# Patient Record
Sex: MALE | Race: WHITE | NOT HISPANIC OR LATINO | Employment: FULL TIME | URBAN - METROPOLITAN AREA
[De-identification: names, ages, dates, MRNs, and addresses within clinical notes are randomized per-mention and may not be internally consistent; named-entity substitution may affect disease eponyms.]

---

## 2020-03-23 ENCOUNTER — OFFICE VISIT (OUTPATIENT)
Dept: FAMILY MEDICINE CLINIC | Facility: CLINIC | Age: 50
End: 2020-03-23
Payer: COMMERCIAL

## 2020-03-23 VITALS
HEART RATE: 91 BPM | WEIGHT: 315 LBS | RESPIRATION RATE: 14 BRPM | SYSTOLIC BLOOD PRESSURE: 115 MMHG | TEMPERATURE: 97.4 F | OXYGEN SATURATION: 94 % | BODY MASS INDEX: 39.17 KG/M2 | DIASTOLIC BLOOD PRESSURE: 75 MMHG | HEIGHT: 75 IN

## 2020-03-23 DIAGNOSIS — Z13.220 ENCOUNTER FOR SCREENING FOR LIPID DISORDER: ICD-10-CM

## 2020-03-23 DIAGNOSIS — Z11.59 ENCOUNTER FOR HEPATITIS C SCREENING TEST FOR LOW RISK PATIENT: Primary | ICD-10-CM

## 2020-03-23 DIAGNOSIS — N52.9 ERECTILE DYSFUNCTION, UNSPECIFIED ERECTILE DYSFUNCTION TYPE: ICD-10-CM

## 2020-03-23 DIAGNOSIS — Z00.00 WELL ADULT EXAM: ICD-10-CM

## 2020-03-23 PROCEDURE — 3008F BODY MASS INDEX DOCD: CPT | Performed by: FAMILY MEDICINE

## 2020-03-23 PROCEDURE — 99396 PREV VISIT EST AGE 40-64: CPT | Performed by: FAMILY MEDICINE

## 2020-03-23 RX ORDER — SILDENAFIL 50 MG/1
50 TABLET, FILM COATED ORAL DAILY PRN
Qty: 10 TABLET | Refills: 0 | Status: SHIPPED | OUTPATIENT
Start: 2020-03-23

## 2020-03-23 NOTE — PROGRESS NOTES
BMI Counseling: Body mass index is 39 37 kg/m²  The BMI is above normal  Nutrition recommendations include decreasing portion sizes, decreasing fast food intake, limiting drinks that contain sugar, moderation in carbohydrate intake and reducing intake of saturated and trans fat  Exercise recommendations include moderate physical activity 150 minutes/week  No pharmacotherapy was ordered  Assessment/Plan:     Chronic Problems:  No problem-specific Assessment & Plan notes found for this encounter  Visit Diagnosis:  Diagnoses and all orders for this visit:    Encounter for hepatitis C screening test for low risk patient  -     CBC and differential; Future  -     Comprehensive metabolic panel; Future  -     Lipid panel; Future  -     TSH, 3rd generation; Future  -     FSH and LH; Future  -     PSA, Total Screen; Future  -     UA (URINE) with reflex to Scope; Future    Encounter for screening for lipid disorder  -     CBC and differential; Future  -     Comprehensive metabolic panel; Future  -     Lipid panel; Future  -     TSH, 3rd generation; Future  -     FSH and LH; Future  -     PSA, Total Screen; Future  -     UA (URINE) with reflex to Scope; Future    Erectile dysfunction, unspecified erectile dysfunction type  -     CBC and differential; Future  -     Comprehensive metabolic panel; Future  -     Lipid panel; Future  -     TSH, 3rd generation; Future  -     FSH and LH; Future  -     PSA, Total Screen; Future  -     UA (URINE) with reflex to Scope; Future  -     sildenafil (VIAGRA) 50 MG tablet; Take 1 tablet (50 mg total) by mouth daily as needed for erectile dysfunction    Well adult exam  -     CBC and differential; Future  -     Comprehensive metabolic panel; Future  -     Lipid panel; Future  -     TSH, 3rd generation; Future  -     FSH and LH; Future  -     PSA, Total Screen; Future  -     UA (URINE) with reflex to Scope;  Future    well exam   Reviewed age appropriate antic guidance ,   Stressed the importance of weight loss, encouraged   Form completed , uds to be ocmpleted  Wellness Visit for Adults   AMBULATORY CARE:   A wellness visit  is when you see your healthcare provider to get screened for health problems  You can also get advice on how to stay healthy  Write down your questions so you remember to ask them  Ask your healthcare provider how often you should have a wellness visit  What happens at a wellness visit:  Your healthcare provider will ask about your health, and your family history of health problems  This includes high blood pressure, heart disease, and cancer  He or she will ask if you have symptoms that concern you, if you smoke, and about your mood  You may also be asked about your intake of medicines, supplements, food, and alcohol  Any of the following may be done:  · Your weight  will be checked  Your height may also be checked so your body mass index (BMI) can be calculated  Your BMI shows if you are at a healthy weight  · Your blood pressure  and heart rate will be checked  Your temperature may also be checked  · Blood and urine tests  may be done  Blood tests may be done to check your cholesterol levels  Abnormal cholesterol levels increase your risk for heart disease and stroke  You may also need a blood or urine test to check for diabetes if you are at increased risk  Urine tests may be done to look for signs of an infection or kidney disease  · A physical exam  includes checking your heartbeat and lungs with a stethoscope  Your healthcare provider may also check your skin to look for sun damage  · Screening tests  may be recommended  A screening test is done to check for diseases that may not cause symptoms  The screening tests you may need depend on your age, gender, family history, and lifestyle habits  For example, colorectal screening may be recommended if you are 48years old or older    Screening tests you need if you are a woman:   · A Pap smear  is used to screen for cervical cancer  Pap smears are usually done every 3 to 5 years depending on your age  You may need them more often if you have had abnormal Pap smear test results in the past  Ask your healthcare provider how often you should have a Pap smear  · A mammogram  is an x-ray of your breasts to screen for breast cancer  Experts recommend mammograms every 2 years starting at age 48 years  You may need a mammogram at age 52 years or younger if you have an increased risk for breast cancer  Talk to your healthcare provider about when you should start having mammograms and how often you need them  Vaccines you may need:   · Get an influenza vaccine  every year  The influenza vaccine protects you from the flu  Several types of viruses cause the flu  The viruses change over time, so new vaccines are made each year  · Get a tetanus-diphtheria (Td) booster vaccine  every 10 years  This vaccine protects you against tetanus and diphtheria  Tetanus is a severe infection that may cause painful muscle spasms and lockjaw  Diphtheria is a severe bacterial infection that causes a thick covering in the back of your mouth and throat  · Get a human papillomavirus (HPV) vaccine  if you are female and aged 23 to 32 or male 23 to 24 and never received it  This vaccine protects you from HPV infection  HPV is the most common infection spread by sexual contact  HPV may also cause vaginal, penile, and anal cancers  · Get a pneumococcal vaccine  if you are aged 72 years or older  The pneumococcal vaccine is an injection given to protect you from pneumococcal disease  Pneumococcal disease is an infection caused by pneumococcal bacteria  The infection may cause pneumonia, meningitis, or an ear infection  · Get a shingles vaccine  if you are aged 61 or older, even if you have had shingles before  The shingles vaccine is an injection to protect you from the varicella-zoster virus   This is the same virus that causes chickenpox  Shingles is a painful rash that develops in people who had chickenpox or have been exposed to the virus  How to eat healthy:  My Plate is a model for planning healthy meals  It shows the types and amounts of foods that should go on your plate  Fruits and vegetables make up about half of your plate, and grains and protein make up the other half  A serving of dairy is included on the side of your plate  The amount of calories and serving sizes you need depends on your age, gender, weight, and height  Examples of healthy foods are listed below:  · Eat a variety of vegetables  such as dark green, red, and orange vegetables  You can also include canned vegetables low in sodium (salt) and frozen vegetables without added butter or sauces  · Eat a variety of fresh fruits , canned fruit in 100% juice, frozen fruit, and dried fruit  · Include whole grains  At least half of the grains you eat should be whole grains  Examples include whole-wheat bread, wheat pasta, brown rice, and whole-grain cereals such as oatmeal     · Eat a variety of protein foods such as seafood (fish and shellfish), lean meat, and poultry without skin (turkey and chicken)  Examples of lean meats include pork leg, shoulder, or tenderloin, and beef round, sirloin, tenderloin, and extra lean ground beef  Other protein foods include eggs and egg substitutes, beans, peas, soy products, nuts, and seeds  · Choose low-fat dairy products such as skim or 1% milk or low-fat yogurt, cheese, and cottage cheese  · Limit unhealthy fats  such as butter, hard margarine, and shortening  Exercise:  Exercise at least 30 minutes per day on most days of the week  Some examples of exercise include walking, biking, dancing, and swimming  You can also fit in more physical activity by taking the stairs instead of the elevator or parking farther away from stores  Include muscle strengthening activities 2 days each week   Regular exercise provides many health benefits  It helps you manage your weight, and decreases your risk for type 2 diabetes, heart disease, stroke, and high blood pressure  Exercise can also help improve your mood  Ask your healthcare provider about the best exercise plan for you  General health and safety guidelines:   · Do not smoke  Nicotine and other chemicals in cigarettes and cigars can cause lung damage  Ask your healthcare provider for information if you currently smoke and need help to quit  E-cigarettes or smokeless tobacco still contain nicotine  Talk to your healthcare provider before you use these products  · Limit alcohol  A drink of alcohol is 12 ounces of beer, 5 ounces of wine, or 1½ ounces of liquor  · Lose weight, if needed  Being overweight increases your risk of certain health conditions  These include heart disease, high blood pressure, type 2 diabetes, and certain types of cancer  · Protect your skin  Do not sunbathe or use tanning beds  Use sunscreen with a SPF 15 or higher  Apply sunscreen at least 15 minutes before you go outside  Reapply sunscreen every 2 hours  Wear protective clothing, hats, and sunglasses when you are outside  · Drive safely  Always wear your seatbelt  Make sure everyone in your car wears a seatbelt  A seatbelt can save your life if you are in an accident  Do not use your cell phone when you are driving  This could distract you and cause an accident  Pull over if you need to make a call or send a text message  · Practice safe sex  Use latex condoms if are sexually active and have more than one partner  Your healthcare provider may recommend screening tests for sexually transmitted infections (STIs)  · Wear helmets, lifejackets, and protective gear  Always wear a helmet when you ride a bike or motorcycle, go skiing, or play sports that could cause a head injury  Wear protective equipment when you play sports   Wear a lifejacket when you are on a boat or doing water sports  © 2017 [employer]  Information is for End User's use only and may not be sold, redistributed or otherwise used for commercial purposes  All illustrations and images included in CareNotes® are the copyrighted property of A D A M , Inc  or Eduard Alba  The above information is an  only  It is not intended as medical advice for individual conditions or treatments  Talk to your doctor, nurse or pharmacist before following any medical regimen to see if it is safe and effective for you  ED  I have described to the patient brief overview of the pathophysiology of erectile function as it relates to good healthy blood flow and good healthy nerve function  Patient understands that medical comorbidities including obesity, high blood pressure, high cholesterol, diabetes, other neurologic concerns as well as lifestyle habits including smoking and alcohol use can contribute to erectile dysfunction        Subjective:    Patient ID: Tomás Lisa is a 52 y o  male      establish   Here for pd exam   Has been working as  A medic , 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[address]   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   Negative chest pain palpitations shortness of breath difficulty breathing cough lesions rash  Has been very active as a medic , understands the need to lose weight  Only real issue is ed ,   At issue is ability to maintain , not on a regular basis , and has been occurring for thepast months  Presently on no medication   Denies issue or problem with urine flow or pattern , neg dysuria ,   Neg smoke , etoh ,       shx   A/p  past med hx  allergies   Soc hx     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  Children  Currently paramed    fam hx  Father passed 68   Mother unk   Sister well         The following portions of the patient's history were reviewed and updated as appropriate: allergies, current medications, past family history, past medical history, past social history, past surgical history and problem list     Review of Systems   Constitutional: Negative for appetite change, chills, fever and unexpected weight change  HENT: Negative for congestion, dental problem, ear pain, hearing loss, postnasal drip, rhinorrhea, sinus pressure, sinus pain, sneezing, sore throat, tinnitus and voice change  Eyes: Negative for visual disturbance  Respiratory: Negative for apnea, cough, chest tightness and shortness of breath  Cardiovascular: Negative for chest pain, palpitations and leg swelling  Gastrointestinal: Negative for abdominal pain, blood in stool, constipation, diarrhea, nausea and vomiting  Endocrine: Negative for cold intolerance, heat intolerance, polydipsia, polyphagia and polyuria  Genitourinary: Negative for decreased urine volume, difficulty urinating, dysuria, frequency and hematuria  Musculoskeletal: Negative for arthralgias, back pain, gait problem, joint swelling and myalgias  Skin: Negative for color change, rash and wound  Allergic/Immunologic: Negative for environmental allergies and food allergies  Neurological: Negative for dizziness, syncope, weakness, light-headedness, numbness and headaches  Hematological: Negative for adenopathy  Does not bruise/bleed easily  Psychiatric/Behavioral: Negative for sleep disturbance and suicidal ideas  The patient is not nervous/anxious            /75 (BP Location: Left arm, Patient Position: Sitting, Cuff Size: Adult)   Pulse 91   Temp (!) 97 4 °F (36 3 °C)   Resp 14   Ht 6' 3" (1 905 m)   Wt (!) 143 kg (315 lb)   SpO2 94%   BMI 39 37 kg/m²   Social History     Socioeconomic History    Marital status: /Civil Union     Spouse name: Not on file    Number of children: Not on file    Years of education: Not on file    Highest education level: Not on file   Occupational History    Not on file   Social Needs    Financial resource strain: Not on file    Food insecurity:     Worry: Not on file     Inability: Not on file  Transportation needs:     Medical: Not on file     Non-medical: Not on file   Tobacco Use    Smoking status: Not on file   Substance and Sexual Activity    Alcohol use: Not on file    Drug use: Not on file    Sexual activity: Not on file   Lifestyle    Physical activity:     Days per week: Not on file     Minutes per session: Not on file    Stress: Not on file   Relationships    Social connections:     Talks on phone: Not on file     Gets together: Not on file     Attends [employer] service: Not on file     Active member of club or organization: Not on file     Attends meetings of clubs or organizations: Not on file     Relationship status: Not on file    Intimate partner violence:     Fear of current or ex partner: Not on file     Emotionally abused: Not on file     Physically abused: Not on file     Forced sexual activity: Not on file   Other Topics Concern    Not on file   Social History Narrative    Not on file     History reviewed  No pertinent past medical history  History reviewed  No pertinent family history  History reviewed  No pertinent surgical history  Current Outpatient Medications:     sildenafil (VIAGRA) 50 MG tablet, Take 1 tablet (50 mg total) by mouth daily as needed for erectile dysfunction, Disp: 10 tablet, Rfl: 0    Allergies not on file       Lab Review   No visits with results within 6 Month(s) from this visit  Latest known visit with results is:   No results found for any previous visit  Imaging: No results found  Objective:     Physical Exam   Constitutional: He is oriented to person, place, and time  He appears well-developed and well-nourished  No distress  HENT:   Head: Normocephalic and atraumatic  Right Ear: External ear normal    Left Ear: External ear normal    Nose: Nose normal    Mouth/Throat: Oropharynx is clear and moist    Eyes: EOM are normal    Neck: Normal range of motion  Neck supple     Cardiovascular: Normal rate, regular rhythm and normal heart sounds  No murmur heard  Pulmonary/Chest: Effort normal and breath sounds normal    Musculoskeletal: Normal range of motion  He exhibits no edema  Lymphadenopathy:     He has no cervical adenopathy  Neurological: He is alert and oriented to person, place, and time  He has normal reflexes  Skin: Skin is warm and dry  Psychiatric: He has a normal mood and affect  His behavior is normal  Judgment and thought content normal          There are no Patient Instructions on file for this visit  WES Velásquez    Portions of the record may have been created with voice recognition software  Occasional wrong word or "sound a like" substitutions may have occurred due to the inherent limitations of voice recognition software  Read the chart carefully and recognize, using context, where substitutions have occurred

## 2021-01-18 ENCOUNTER — OFFICE VISIT (OUTPATIENT)
Dept: FAMILY MEDICINE CLINIC | Facility: CLINIC | Age: 51
End: 2021-01-18
Payer: COMMERCIAL

## 2021-01-18 ENCOUNTER — TELEPHONE (OUTPATIENT)
Dept: FAMILY MEDICINE CLINIC | Facility: CLINIC | Age: 51
End: 2021-01-18

## 2021-01-18 DIAGNOSIS — J06.9 UPPER RESPIRATORY TRACT INFECTION, UNSPECIFIED TYPE: Primary | ICD-10-CM

## 2021-01-18 PROCEDURE — 99214 OFFICE O/P EST MOD 30 MIN: CPT | Performed by: FAMILY MEDICINE

## 2021-01-18 RX ORDER — AZITHROMYCIN 250 MG/1
TABLET, FILM COATED ORAL
Qty: 6 TABLET | Refills: 0 | Status: SHIPPED | OUTPATIENT
Start: 2021-01-18 | End: 2021-01-22

## 2021-01-18 RX ORDER — ALBUTEROL SULFATE 90 UG/1
2 AEROSOL, METERED RESPIRATORY (INHALATION) EVERY 6 HOURS PRN
Qty: 1 INHALER | Refills: 5 | Status: SHIPPED | OUTPATIENT
Start: 2021-01-18 | End: 2021-02-04 | Stop reason: SDUPTHER

## 2021-01-18 NOTE — PROGRESS NOTES
Virtual Regular Visit      Assessment/Plan:    Problem List Items Addressed This Visit     None      Visit Diagnoses     Upper respiratory tract infection, unspecified type    -  Primary    Relevant Medications    albuterol (PROVENTIL HFA,VENTOLIN HFA) 90 mcg/act inhaler    azithromycin (ZITHROMAX) 250 mg tablet    Other Relevant Orders    XR chest pa & lateral      URI  Discussed patient concerns or complaints, self reported negative COVID  Reviewed records  Recommend chest x-ray, albuterol inhaler as directed a Z-Mykel  Would advise against returning to work, note to be given, schedule follow-up appointment in office  Any changes worsening recommend ER evaluation    BMI Counseling: There is no height or weight on file to calculate BMI  The BMI is above normal  Nutrition recommendations include decreasing portion sizes, decreasing fast food intake, limiting drinks that contain sugar, moderation in carbohydrate intake, increasing intake of lean protein, reducing intake of saturated and trans fat and reducing intake of cholesterol  Exercise recommendations include moderate physical activity 150 minutes/week and exercising 3-5 times per week  No pharmacotherapy was ordered  Reason for visit is   Chief Complaint   Patient presents with    Virtual Regular Visit        Encounter provider W[facility] Porras    Provider located at [facility] [address] [address] [phone]      Recent Visits  No visits were found meeting these conditions  Showing recent visits within past 7 days and meeting all other requirements     Today's Visits  Date Type Provider Dept   01/18/21 Office Visit [facility] today's visits and meeting all other requirements     Future Appointments  No visits were found meeting these conditions     Showing future appointments within next 150 days and meeting all other requirements        The patient was identified by name and date of birth  Kasey Mace was informed that this is a telemedicine visit and that the visit is being conducted through [employer] and patient was informed that this is not a secure, HIPAA-compliant platform  He agrees to proceed     My office door was closed  No one else was in the room  He acknowledged consent and understanding of privacy and security of the video platform  The patient has agreed to participate and understands they can discontinue the visit at any time  Patient is aware this is a billable service  Subjective  Kasey Mace is a 48 y o  male    Complaint cough exertional dyspnea which resolves spontaneously nasal congestion  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 this past week tested negative for COVID  Denies fever chills sinus pressure pain negative sore throat negative for issue  In regards to care has been in contact with occupational medicine at employer  Once again has tested negative for COVID through hospital 3 days ago  Concern is has not fully recovered  Cough is nonproductive, has been able to do activities daily living around house with no issue, does have expiratory wheeze  Negative history of asthma smoking, negative chest pain palpitations       No past medical history on file  No past surgical history on file      Current Outpatient Medications   Medication Sig Dispense Refill    albuterol (PROVENTIL HFA,VENTOLIN HFA) 90 mcg/act inhaler Inhale 2 puffs every 6 (six) hours as needed for wheezing or shortness of breath 1 Inhaler 5    azithromycin (ZITHROMAX) 250 mg tablet Take 2 tablets today then 1 tablet daily x 4 days 6 tablet 0    sildenafil (VIAGRA) 50 MG tablet Take 1 tablet (50 mg total) by mouth daily as needed for erectile dysfunction 10 tablet 0     No current facility-administered medications for this visit  Not on File    Review of Systems   Constitutional: Negative for appetite change, chills, fever and unexpected weight change  HENT: Positive for congestion, postnasal drip and rhinorrhea  Negative for dental problem, ear pain, hearing loss, sinus pressure, sinus pain, sneezing, sore throat, tinnitus and voice change  Eyes: Negative for visual disturbance  Respiratory: Positive for cough and wheezing  Negative for apnea, chest tightness and shortness of breath  Cardiovascular: Negative for chest pain, palpitations and leg swelling  Gastrointestinal: Negative for abdominal pain, blood in stool, constipation, diarrhea, nausea and vomiting  Endocrine: Negative for cold intolerance, heat intolerance, polydipsia, polyphagia and polyuria  Genitourinary: Negative for decreased urine volume, difficulty urinating, dysuria, frequency and hematuria  Musculoskeletal: Negative for arthralgias, back pain, gait problem, joint swelling and myalgias  Skin: Negative for color change, rash and wound  Allergic/Immunologic: Negative for environmental allergies and food allergies  Neurological: Negative for dizziness, syncope, weakness, light-headedness, numbness and headaches  Hematological: Negative for adenopathy  Does not bruise/bleed easily  Psychiatric/Behavioral: Negative for sleep disturbance and suicidal ideas  The patient is not nervous/anxious  Video Exam    There were no vitals filed for this visit  Physical Exam  Constitutional:       General: He is not in acute distress  Appearance: He is not ill-appearing or toxic-appearing  HENT:      Head: Normocephalic and atraumatic  Eyes:      Conjunctiva/sclera: Conjunctivae normal    Pulmonary:      Effort: Pulmonary effort is normal  No respiratory distress  Musculoskeletal: Normal range of motion  Skin:     Coloration: Skin is not pale            I spent 20 minutes directly with the patient during this visit      VIRTUAL VISIT DISCLAIMER    Claudene Corwin acknowledges that he has consented to an online visit or consultation  He understands that the online visit is based solely on information provided by him, and that, in the absence of a face-to-face physical evaluation by the physician, the diagnosis he receives is both limited and provisional in terms of accuracy and completeness  This is not intended to replace a full medical face-to-face evaluation by the physician  Claudene Corwin understands and accepts these terms

## 2021-01-18 NOTE — TELEPHONE ENCOUNTER
Called to check patient out of virtual appointment  He said you were going to have someone up front make him a note  I asked what the note is about and he said it is to be out of work and you would let us know  What do you need his note to say?

## 2021-01-20 ENCOUNTER — HOSPITAL ENCOUNTER (OUTPATIENT)
Dept: RADIOLOGY | Facility: HOSPITAL | Age: 51
Discharge: HOME | End: 2021-01-20
Payer: COMMERCIAL

## 2021-01-20 DIAGNOSIS — J06.9 UPPER RESPIRATORY TRACT INFECTION, UNSPECIFIED TYPE: ICD-10-CM

## 2021-01-20 PROCEDURE — 71046 X-RAY EXAM CHEST 2 VIEWS: CPT

## 2021-01-21 ENCOUNTER — TELEPHONE (OUTPATIENT)
Dept: FAMILY MEDICINE CLINIC | Facility: CLINIC | Age: 51
End: 2021-01-21

## 2021-01-25 ENCOUNTER — TELEMEDICINE (OUTPATIENT)
Dept: FAMILY MEDICINE CLINIC | Facility: CLINIC | Age: 51
End: 2021-01-25
Payer: COMMERCIAL

## 2021-01-25 VITALS — DIASTOLIC BLOOD PRESSURE: 70 MMHG | SYSTOLIC BLOOD PRESSURE: 104 MMHG

## 2021-01-25 DIAGNOSIS — J06.9 UPPER RESPIRATORY TRACT INFECTION, UNSPECIFIED TYPE: Primary | ICD-10-CM

## 2021-01-25 PROCEDURE — 99213 OFFICE O/P EST LOW 20 MIN: CPT | Performed by: FAMILY MEDICINE

## 2021-01-25 NOTE — PROGRESS NOTES
Virtual Regular Visit      Assessment/Plan:    Problem List Items Addressed This Visit     None      Visit Diagnoses     Upper respiratory tract infection, unspecified type    -  Primary       URI, viral  Negative COVID  Discussed symptomatic care, recommended pulmonary evaluation, refused/deferred, continue monitoring call for any changes increasing    BMI Counseling: There is no height or weight on file to calculate BMI  The BMI is above normal  Nutrition recommendations include decreasing portion sizes, decreasing fast food intake, limiting drinks that contain sugar, moderation in carbohydrate intake, increasing intake of lean protein, reducing intake of saturated and trans fat and reducing intake of cholesterol  Exercise recommendations include moderate physical activity 150 minutes/week and exercising 3-5 times per week  No pharmacotherapy was ordered  Depression Screening and Follow-up Plan: Clincally patient does not have depression  No treatment is required  Reason for visit is   Chief Complaint   Patient presents with    Virtual Regular Visit        Encounter provider WES Hernandez    Provider located at [address]  [phone]      Recent Visits  Date Type Provider Dept   01/21/21 Telephone Beryle Ashing, LPN [facility] [address] [place], [place] [address] [address]   01/18/21 Telephone Prosper Mercado, [address] [place] [address]   01/18/21 Office Visit Kaitlyn [address] [facility] Inf recent visits within past 7 days and meeting all other requirements     Today's Visits  Date Type Provider Dept   01/25/21 Telemedicine WES Hernandez [facility] [address]   Showing today's visits and meeting all other requirements     Future Appointments  No visits were found meeting these conditions     Showing future appointments within next 150 days and meeting all other requirements        The patient was identified by name and date of birth  Benoit Ramirez was informed that this is a telemedicine visit and that the visit is being conducted through [employer] and patient was informed that this is not a secure, HIPAA-compliant platform  He agrees to proceed     My office door was closed  No one else was in the room  He acknowledged consent and understanding of privacy and security of the video platform  The patient has agreed to participate and understands they can discontinue the visit at any time  Patient is aware this is a billable service  Subjective  Benoit Ramirez is a 48 y o  male       F/u   Shortness of breath on exertion, has improved steadily, continues experience mild symptoms more so when caring extra items up stairway, and momentary/brief experience resolves within seconds, feels still related to URI of previous has tested negative for COVID x2, but feel after speaking with employer symptoms consistent with COVID at initiation once again no longer with wheezing cough mild has had improvement since previous treatment  Denies chest pain palpitations negative swelling to extremities negative back pain  Use of rescue inhaler decreasing  Negative weight gain weight loss  Overall some improvement feels consistent, request additional time off for recovery  Hesitant to return to 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       No past medical history on file  No past surgical history on file      Current Outpatient Medications   Medication Sig Dispense Refill    albuterol (PROVENTIL HFA,VENTOLIN HFA) 90 mcg/act inhaler Inhale 2 puffs every 6 (six) hours as needed for wheezing or shortness of breath 1 Inhaler 5    sildenafil (VIAGRA) 50 MG tablet Take 1 tablet (50 mg total) by mouth daily as needed for erectile dysfunction 10 tablet 0     No current facility-administered medications for this visit  Not on File    Review of Systems   Constitutional: Negative for appetite change, chills, fever and unexpected weight change  HENT: Negative for congestion, dental problem, ear pain, hearing loss, postnasal drip, rhinorrhea, sinus pressure, sinus pain, sneezing, sore throat, tinnitus and voice change  Eyes: Negative for visual disturbance  Respiratory: Positive for cough and shortness of breath  Negative for apnea, chest tightness, wheezing and stridor  Cardiovascular: Negative for chest pain, palpitations and leg swelling  Gastrointestinal: Negative for abdominal pain, blood in stool, constipation, diarrhea, nausea and vomiting  Endocrine: Negative for cold intolerance, heat intolerance, polydipsia, polyphagia and polyuria  Genitourinary: Negative for decreased urine volume, difficulty urinating, dysuria, frequency and hematuria  Musculoskeletal: Negative for arthralgias, back pain, gait problem, joint swelling and myalgias  Skin: Negative for color change, rash and wound  Allergic/Immunologic: Negative for environmental allergies and food allergies  Neurological: Negative for dizziness, syncope, weakness, light-headedness, numbness and headaches  Hematological: Negative for adenopathy  Does not bruise/bleed easily  Psychiatric/Behavioral: Negative for sleep disturbance and suicidal ideas  The patient is not nervous/anxious  Video Exam    Vitals:    01/25/21 1052   BP: 104/70       Physical Exam  Constitutional:       General: He is not in acute distress  Appearance: He is not ill-appearing or toxic-appearing  HENT:      Head: Normocephalic and atraumatic  Eyes:      General: No scleral icterus  Conjunctiva/sclera: Conjunctivae normal    Neck:      Musculoskeletal: Normal range of motion  Pulmonary:      Effort: Pulmonary effort is normal  No respiratory distress  Skin:     Coloration: Skin is not pale     Neurological:      Mental Status: He is oriented to person, place, and time  Psychiatric:         Mood and Affect: Mood normal          Behavior: Behavior normal          Thought Content: Thought content normal          Judgment: Judgment normal           I spent 20 minutes directly with the patient during this visit      VIRTUAL VISIT DISCLAIMER    Jesse Dixon acknowledges that he has consented to an online visit or consultation  He understands that the online visit is based solely on information provided by him, and that, in the absence of a face-to-face physical evaluation by the physician, the diagnosis he receives is both limited and provisional in terms of accuracy and completeness  This is not intended to replace a full medical face-to-face evaluation by the physician  Jesse Dixon understands and accepts these terms

## 2021-02-04 ENCOUNTER — TELEMEDICINE (OUTPATIENT)
Dept: FAMILY MEDICINE CLINIC | Facility: CLINIC | Age: 51
End: 2021-02-04
Payer: COMMERCIAL

## 2021-02-04 DIAGNOSIS — J06.9 UPPER RESPIRATORY TRACT INFECTION, UNSPECIFIED TYPE: ICD-10-CM

## 2021-02-04 PROCEDURE — 99213 OFFICE O/P EST LOW 20 MIN: CPT | Performed by: FAMILY MEDICINE

## 2021-02-04 RX ORDER — ALBUTEROL SULFATE 90 UG/1
2 AEROSOL, METERED RESPIRATORY (INHALATION) EVERY 6 HOURS PRN
Qty: 1 INHALER | Refills: 5 | Status: SHIPPED | OUTPATIENT
Start: 2021-02-04 | End: 2021-03-03 | Stop reason: SDUPTHER

## 2021-02-04 NOTE — PROGRESS NOTES
Virtual Regular Visit      Assessment/Plan:    Problem List Items Addressed This Visit     None      Visit Diagnoses     Upper respiratory tract infection, unspecified type        Relevant Medications    albuterol (PROVENTIL HFA,VENTOLIN HFA) 90 mcg/act inhaler       uri, viral   discussed in reviewed care with patient, overall symptomatology improved from baseline,   patient request return to work note to be generated   advised to call for any changes, patient agrees plan         Reason for visit is   Chief Complaint   Patient presents with    Virtual Regular Visit        Encounter provider WES Medrano    Provider located at [facility] [address] [facility]  [address]  [address] [facility]  [phone]      Recent Visits  No visits were found meeting these conditions  Showing recent visits within past 7 days and meeting all other requirements     Today's Visits  Date Type Provider Dept   02/04/21 Telemedicine WES Medrano  [facility] [address] today's visits and meeting all other requirements     Future Appointments  No visits were found meeting these conditions  Showing future appointments within next 150 days and meeting all other requirements        The patient was identified by name and date of birth  Marquita Moon was informed that this is a telemedicine visit and that the visit is being conducted through [employer] and patient was informed that this is not a secure, HIPAA-compliant platform  He agrees to proceed     My office door was closed  No one else was in the room  He acknowledged consent and understanding of privacy and security of the video platform  The patient has agreed to participate and understands they can discontinue the visit at any time  Patient is aware this is a billable service  Subjective  Marquita Moon is a 48 y o  male          Feeling   Better   initial signs symptoms began after potential exposure COVID, seen and examined by ER and  Decrease cough increased energy, was able to Has shovel snow clear wrote for a house  Without issue, has use albuterol inhaler over the past weeks infrequently but with positive results   energy levels improving not quite yet back to baseline but feels adequate improvement to return to work   negative fever chills chest pain palpitations, swelling to extremities nausea vomiting          No past medical history on file  No past surgical history on file  Current Outpatient Medications   Medication Sig Dispense Refill    albuterol (PROVENTIL HFA,VENTOLIN HFA) 90 mcg/act inhaler Inhale 2 puffs every 6 (six) hours as needed for wheezing or shortness of breath 1 Inhaler 5    sildenafil (VIAGRA) 50 MG tablet Take 1 tablet (50 mg total) by mouth daily as needed for erectile dysfunction 10 tablet 0     No current facility-administered medications for this visit  Not on File    Review of Systems   Constitutional: Negative for appetite change, chills, fever and unexpected weight change  HENT: Negative for congestion, dental problem, ear pain, hearing loss, postnasal drip, rhinorrhea, sinus pressure, sinus pain, sneezing, sore throat, tinnitus and voice change  Eyes: Negative for visual disturbance  Respiratory: Negative for apnea, cough, chest tightness and shortness of breath  Cardiovascular: Negative for chest pain, palpitations and leg swelling  Gastrointestinal: Negative for abdominal pain, blood in stool, constipation, diarrhea, nausea and vomiting  Endocrine: Negative for cold intolerance, heat intolerance, polydipsia, polyphagia and polyuria  Genitourinary: Negative for decreased urine volume, difficulty urinating, dysuria, frequency and hematuria  Musculoskeletal: Negative for arthralgias, back pain, gait problem, joint swelling and myalgias  Skin: Negative for color change, rash and wound  Allergic/Immunologic: Negative for environmental allergies and food allergies  Neurological: Negative for dizziness, syncope, weakness, light-headedness, numbness and headaches  Hematological: Negative for adenopathy  Does not bruise/bleed easily  Psychiatric/Behavioral: Negative for sleep disturbance and suicidal ideas  The patient is not nervous/anxious  Video Exam    There were no vitals filed for this visit  Physical Exam  Constitutional:       General: He is not in acute distress  Appearance: He is not ill-appearing  Comments: Overall appearance improved from previous, able to speak in clear and concise sentences without interruption   HENT:      Head: Normocephalic and atraumatic  Pulmonary:      Effort: Pulmonary effort is normal  No respiratory distress  Musculoskeletal: Normal range of motion  Skin:     Coloration: Skin is not pale  Neurological:      Mental Status: He is oriented to person, place, and time  I spent 15 minutes directly with the patient during this visit      VIRTUAL VISIT DISCLAIMER    Marquita Moon acknowledges that he has consented to an online visit or consultation  He understands that the online visit is based solely on information provided by him, and that, in the absence of a face-to-face physical evaluation by the physician, the diagnosis he receives is both limited and provisional in terms of accuracy and completeness  This is not intended to replace a full medical face-to-face evaluation by the physician  Marquita Moon understands and accepts these terms

## 2021-02-11 ENCOUNTER — TELEMEDICINE (OUTPATIENT)
Dept: FAMILY MEDICINE CLINIC | Facility: CLINIC | Age: 51
End: 2021-02-11
Payer: COMMERCIAL

## 2021-02-11 DIAGNOSIS — J06.9 UPPER RESPIRATORY TRACT INFECTION, UNSPECIFIED TYPE: Primary | ICD-10-CM

## 2021-02-11 PROCEDURE — 99213 OFFICE O/P EST LOW 20 MIN: CPT | Performed by: FAMILY MEDICINE

## 2021-02-11 RX ORDER — PREDNISONE 10 MG/1
TABLET ORAL
Qty: 30 TABLET | Refills: 0 | Status: SHIPPED | OUTPATIENT
Start: 2021-02-11 | End: 2021-03-03 | Stop reason: DRUGHIGH

## 2021-02-11 RX ORDER — FLUTICASONE PROPIONATE 50 MCG
1 SPRAY, SUSPENSION (ML) NASAL DAILY
Qty: 1 BOTTLE | Refills: 3 | Status: SHIPPED | OUTPATIENT
Start: 2021-02-11 | End: 2021-04-12

## 2021-02-11 NOTE — PROGRESS NOTES
Virtual Regular Visit      Assessment/Plan:    Problem List Items Addressed This Visit     None      Visit Diagnoses     Upper respiratory tract infection, unspecified type    -  Primary    Relevant Medications    predniSONE 10 mg tablet    fluticasone (FLONASE) 50 mcg/act nasal spray       discussed issues concerns with patient, difficult to assess overall status via tele Health, recommend in office evaluation, that being said will give short course prednisone steroid burst, stressed importance of proper hydration throughout the day continued use of inhaler as needed, any changes worsening continue to encourage evaluation within emergency department         Reason for visit is   Chief Complaint   Patient presents with    Virtual Regular Visit        Encounter provider WES Lazar    Provider located at [facility] [address] [id number] [phone]      Recent Visits  Date Type Provider Dept   02/04/21 [address] [place] [address], [facility] recent visits within past 7 days and meeting all other requirements     Today's Visits  Date Type Provider Dept   02/11/21 Telemedicine WES Lazar [facility] [address] today's visits and meeting all other requirements     Future Appointments  No visits were found meeting these conditions  Showing future appointments within next 150 days and meeting all other requirements        The patient was identified by name and date of birth  Jarome Section was informed that this is a telemedicine visit and that the visit is being conducted through [employer] and patient was informed that this is not a secure, HIPAA-compliant platform  He agrees to proceed     My office door was closed  No one else was in the room  He acknowledged consent and understanding of privacy and security of the video platform  The patient has agreed to participate and understands they can discontinue the visit at any time  Patient is aware this is a billable service  Subjective  Coby Cortez is a 48 y o  male    F/u   Concern for linging symptoms related to covid   Cough and some element of sob with extreme actviity , such as running up 2 flight of stairs is an  Issue , feels more winded than pre illness,   Recovery is quick , after removal of mask   Mask is causing a bit of problems , sensation of inadequate air flow ? Nasal mucus clear , nasal passages congested , neg sinus infection , st   Does not wake up with mucus , no  On occasion sensation of exp wheeze   Neg cp , palpatations, has run ekg at work , o2 sats normal      Has not considered the need for ER or urgent care evaluation         No past medical history on file  No past surgical history on file  Current Outpatient Medications   Medication Sig Dispense Refill    albuterol (PROVENTIL HFA,VENTOLIN HFA) 90 mcg/act inhaler Inhale 2 puffs every 6 (six) hours as needed for wheezing or shortness of breath 1 Inhaler 5    fluticasone (FLONASE) 50 mcg/act nasal spray 1 spray into each nostril daily 1 Bottle 3    predniSONE 10 mg tablet 5/5/4/4/3/3/2/2/1/1 po qd 30 tablet 0    sildenafil (VIAGRA) 50 MG tablet Take 1 tablet (50 mg total) by mouth daily as needed for erectile dysfunction 10 tablet 0     No current facility-administered medications for this visit  Not on File    Review of Systems   Constitutional: Negative for appetite change, chills, fever and unexpected weight change  HENT: Positive for congestion  Negative for dental problem, ear pain, hearing loss, postnasal drip, rhinorrhea, sinus pressure, sinus pain, sneezing, sore throat, tinnitus and voice change  Eyes: Negative for visual disturbance  Respiratory: Positive for cough and wheezing  Negative for apnea, chest tightness and shortness of breath      Cardiovascular: Negative for chest pain, palpitations and leg swelling  Gastrointestinal: Negative for abdominal pain, blood in stool, constipation, diarrhea, nausea and vomiting  Endocrine: Negative for cold intolerance, heat intolerance, polydipsia, polyphagia and polyuria  Genitourinary: Negative for decreased urine volume, difficulty urinating, dysuria, frequency and hematuria  Musculoskeletal: Negative for arthralgias, back pain, gait problem, joint swelling and myalgias  Skin: Negative for color change, rash and wound  Allergic/Immunologic: Negative for environmental allergies and food allergies  Neurological: Negative for dizziness, syncope, weakness, light-headedness, numbness and headaches  Hematological: Negative for adenopathy  Does not bruise/bleed easily  Psychiatric/Behavioral: Negative for sleep disturbance and suicidal ideas  The patient is not nervous/anxious  Video Exam    There were no vitals filed for this visit  Physical Exam  Constitutional:       Appearance: He is obese  He is not ill-appearing  HENT:      Head: Normocephalic and atraumatic  Nose: Congestion present  Eyes:      Conjunctiva/sclera: Conjunctivae normal    Pulmonary:      Effort: Pulmonary effort is normal  No respiratory distress  Breath sounds: Wheezes:  forced expiratory wheeze  Neurological:      Mental Status: He is oriented to person, place, and time  I spent 20 minutes directly with the patient during this visit      VIRTUAL VISIT DISCLAIMER    Irish Yang acknowledges that he has consented to an online visit or consultation  He understands that the online visit is based solely on information provided by him, and that, in the absence of a face-to-face physical evaluation by the physician, the diagnosis he receives is both limited and provisional in terms of accuracy and completeness  This is not intended to replace a full medical face-to-face evaluation by the physician   [facility] understands and accepts these terms

## 2021-02-23 ENCOUNTER — OFFICE VISIT (OUTPATIENT)
Dept: FAMILY MEDICINE CLINIC | Facility: CLINIC | Age: 51
End: 2021-02-23
Payer: COMMERCIAL

## 2021-02-23 VITALS
WEIGHT: 315 LBS | HEART RATE: 85 BPM | DIASTOLIC BLOOD PRESSURE: 78 MMHG | OXYGEN SATURATION: 97 % | HEIGHT: 75 IN | TEMPERATURE: 97.5 F | SYSTOLIC BLOOD PRESSURE: 128 MMHG | RESPIRATION RATE: 19 BRPM | BODY MASS INDEX: 39.17 KG/M2

## 2021-02-23 DIAGNOSIS — Z13.220 ENCOUNTER FOR SCREENING FOR LIPID DISORDER: ICD-10-CM

## 2021-02-23 DIAGNOSIS — J06.9 UPPER RESPIRATORY TRACT INFECTION, UNSPECIFIED TYPE: ICD-10-CM

## 2021-02-23 DIAGNOSIS — E66.01 CLASS 3 SEVERE OBESITY DUE TO EXCESS CALORIES WITHOUT SERIOUS COMORBIDITY WITH BODY MASS INDEX (BMI) OF 45.0 TO 49.9 IN ADULT (HCC): Primary | ICD-10-CM

## 2021-02-23 PROCEDURE — 99214 OFFICE O/P EST MOD 30 MIN: CPT | Performed by: FAMILY MEDICINE

## 2021-02-23 NOTE — PROGRESS NOTES
Assessment/Plan:     Chronic Problems:  No problem-specific Assessment & Plan notes found for this encounter  Visit Diagnosis:  Diagnoses and all orders for this visit:    Class 3 severe obesity due to excess calories without serious comorbidity with body mass index (BMI) of 45 0 to 49 9 in adult [facility])  -     Ambulatory referral to Weight Management; Future  -     CBC and differential; Future  -     Comprehensive metabolic panel; Future  -     Insulin antibody; Future  -     Glutamic acid decarboxylase; Future  -     TSH, 3rd generation; Future  -     UA w Reflex to Microscopic w Reflex to Culture -Lab Collect    Upper respiratory tract infection, unspecified type    Encounter for screening for lipid disorder  -     Lipid panel; Future  -     LDL cholesterol, direct; Future     viral  Syndrome   resolved, return to work   obesity   discussed issue concerns at length, recommend evaluation with weight management team, patient agrees        Subjective:    Patient ID: Jesse Dixon is a 48 y o  male  F/u   Viral syndrome   Overall breathing has improved , neg cp , palp , swelling to extremities negative previous history of sleep apnea, LUBNA negative evaluated   weight, considerably surprised at the amount of weight gain over the past, mostly has exceeded expectations,  into weight loss program, recommendations were for appreciated        The following portions of the patient's history were reviewed and updated as appropriate: allergies, current medications, past family history, past medical history, past social history, past surgical history and problem list     Review of Systems   Constitutional: Negative for appetite change, chills, fever and unexpected weight change  HENT: Negative for congestion, dental problem, ear pain, hearing loss, postnasal drip, rhinorrhea, sinus pressure, sinus pain, sneezing, sore throat, tinnitus and voice change  Eyes: Negative for visual disturbance     Respiratory: Negative for apnea, cough, chest tightness and shortness of breath  Cardiovascular: Negative for chest pain, palpitations and leg swelling  Gastrointestinal: Negative for abdominal pain, blood in stool, constipation, diarrhea, nausea and vomiting  Endocrine: Negative for cold intolerance, heat intolerance, polydipsia, polyphagia and polyuria  Genitourinary: Negative for decreased urine volume, difficulty urinating, dysuria, frequency and hematuria  Musculoskeletal: Negative for arthralgias, back pain, gait problem, joint swelling and myalgias  Skin: Negative for color change, rash and wound  Allergic/Immunologic: Negative for environmental allergies and food allergies  Neurological: Negative for dizziness, syncope, weakness, light-headedness, numbness and headaches  Hematological: Negative for adenopathy  Does not bruise/bleed easily  Psychiatric/Behavioral: Negative for sleep disturbance and suicidal ideas  The patient is not nervous/anxious            /78 (BP Location: Left arm, Patient Position: Sitting, Cuff Size: Adult)   Pulse 85   Temp 97 5 °F (36 4 °C)   Resp 19   Ht 6' 3" (1 905 m)   Wt (!) 168 kg (370 lb)   SpO2 97%   BMI 46 25 kg/m²   Social History     Socioeconomic History    Marital status: /Civil Union     Spouse name: Not on file    Number of children: Not on file    Years of education: Not on file    Highest education level: Not on file   Occupational History    Not on file   Social Needs    Financial resource strain: Not on file    Food insecurity     Worry: Not on file     Inability: Not on file   [place] needs     Medical: Not on file     Non-medical: Not on file   Tobacco Use    Smoking status: Former Smoker    Smokeless tobacco: Never Used   Substance and Sexual Activity    Alcohol use: Yes     Frequency: 2-3 times a week     Drinks per session: 1 or 2     Binge frequency: Never    Drug use: Never    Sexual activity: Yes   Lifestyle  Physical activity     Days per week: Not on file     Minutes per session: Not on file    Stress: Not on file   Relationships    Social connections     Talks on phone: Not on file     Gets together: Not on file     Attends Alevism service: Not on file     Active member of club or organization: Not on file     Attends meetings of clubs or organizations: Not on file     Relationship status: Not on file    Intimate partner violence     Fear of current or ex partner: Not on file     Emotionally abused: Not on file     Physically abused: Not on file     Forced sexual activity: Not on file   Other Topics Concern    Not on file   Social History Narrative    Not on file     History reviewed  No pertinent past medical history  History reviewed  No pertinent family history  History reviewed  No pertinent surgical history  Current Outpatient Medications:     albuterol (PROVENTIL HFA,VENTOLIN HFA) 90 mcg/act inhaler, Inhale 2 puffs every 6 (six) hours as needed for wheezing or shortness of breath, Disp: 1 Inhaler, Rfl: 5    fluticasone (FLONASE) 50 mcg/act nasal spray, 1 spray into each nostril daily, Disp: 1 Bottle, Rfl: 3    sildenafil (VIAGRA) 50 MG tablet, Take 1 tablet (50 mg total) by mouth daily as needed for erectile dysfunction, Disp: 10 tablet, Rfl: 0    predniSONE 10 mg tablet, 5/5/4/4/3/3/2/2/1/1 po qd, Disp: 30 tablet, Rfl: 0    No Known Allergies       Lab Review   No visits with results within 6 Month(s) from this visit  Latest known visit with results is:   No results found for any previous visit  Imaging: No results found  Objective:     Physical Exam  Constitutional:       Appearance: He is well-developed  He is obese  HENT:      Head: Normocephalic and atraumatic  Neck:      Musculoskeletal: Normal range of motion and neck supple  Cardiovascular:      Rate and Rhythm: Normal rate and regular rhythm  Heart sounds: Normal heart sounds     Pulmonary:      Effort: Pulmonary effort is normal       Breath sounds: Normal breath sounds  Abdominal:      General: Bowel sounds are normal       Palpations: Abdomen is soft  Musculoskeletal: Normal range of motion  Skin:     General: Skin is warm and dry  Neurological:      Mental Status: He is alert and oriented to person, place, and time  Deep Tendon Reflexes: Reflexes are normal and symmetric  Psychiatric:         Behavior: Behavior normal          Thought Content: Thought content normal          Judgment: Judgment normal            There are no Patient Instructions on file for this visit  WES Lewis    Portions of the record may have been created with voice recognition software  Occasional wrong word or "sound a like" substitutions may have occurred due to the inherent limitations of voice recognition software  Read the chart carefully and recognize, using context, where substitutions have occurred

## 2021-03-03 ENCOUNTER — OFFICE VISIT (OUTPATIENT)
Dept: FAMILY MEDICINE CLINIC | Facility: CLINIC | Age: 51
End: 2021-03-03
Payer: COMMERCIAL

## 2021-03-03 VITALS
WEIGHT: 315 LBS | HEIGHT: 75 IN | HEART RATE: 99 BPM | DIASTOLIC BLOOD PRESSURE: 78 MMHG | OXYGEN SATURATION: 94 % | SYSTOLIC BLOOD PRESSURE: 120 MMHG | TEMPERATURE: 97 F | BODY MASS INDEX: 39.17 KG/M2

## 2021-03-03 DIAGNOSIS — J06.9 UPPER RESPIRATORY TRACT INFECTION, UNSPECIFIED TYPE: ICD-10-CM

## 2021-03-03 DIAGNOSIS — R06.02 SOB (SHORTNESS OF BREATH): Primary | ICD-10-CM

## 2021-03-03 DIAGNOSIS — Z20.822 CLOSE EXPOSURE TO COVID-19 VIRUS: ICD-10-CM

## 2021-03-03 DIAGNOSIS — F41.9 ANXIETY: ICD-10-CM

## 2021-03-03 DIAGNOSIS — R06.2 WHEEZES: ICD-10-CM

## 2021-03-03 PROCEDURE — 99214 OFFICE O/P EST MOD 30 MIN: CPT | Performed by: NURSE PRACTITIONER

## 2021-03-03 RX ORDER — PREDNISONE 20 MG/1
20 TABLET ORAL DAILY
Qty: 5 TABLET | Refills: 0 | Status: SHIPPED | OUTPATIENT
Start: 2021-03-03

## 2021-03-03 RX ORDER — ALBUTEROL SULFATE 90 UG/1
2 AEROSOL, METERED RESPIRATORY (INHALATION) EVERY 6 HOURS PRN
Qty: 1 INHALER | Refills: 5 | Status: SHIPPED | OUTPATIENT
Start: 2021-03-03 | End: 2021-05-25 | Stop reason: SDUPTHER

## 2021-03-03 RX ORDER — BUSPIRONE HYDROCHLORIDE 5 MG/1
5 TABLET ORAL 3 TIMES DAILY
Qty: 30 TABLET | Refills: 0 | Status: SHIPPED | OUTPATIENT
Start: 2021-03-03

## 2021-03-03 NOTE — ASSESSMENT & PLAN NOTE
Does have some mild anxiety associated with wearing the mask  and getting short of breath  Discussed the benefits of  Taking BuSpar as needed for anxiety  Discussed self-care

## 2021-03-03 NOTE — PROGRESS NOTES
Assessment/Plan:     SOB (shortness of breath)   Continues to have shortness of breath especially with exertion  Pulmonary referral made  Work note given  Patient does have wheezes especially on the left bases  Prednisone therapy prescribed  Continue albuterol for acute episodes of shortness of breath  Anxiety    Does have some mild anxiety associated with wearing the mask  and getting short of breath  Discussed the benefits of  Taking BuSpar as needed for anxiety  Discussed self-care  Problem List Items Addressed This Visit        Other    SOB (shortness of breath) - Primary      Continues to have shortness of breath especially with exertion  Pulmonary referral made  Work note given  Patient does have wheezes especially on the left bases  Prednisone therapy prescribed  Continue albuterol for acute episodes of shortness of breath  Relevant Medications    predniSONE 20 mg tablet    Anxiety       Does have some mild anxiety associated with wearing the mask  and getting short of breath  Discussed the benefits of  Taking BuSpar as needed for anxiety  Discussed self-care  Relevant Medications    busPIRone (BUSPAR) 5 mg tablet      Other Visit Diagnoses     Close exposure to COVID-19 virus        Relevant Orders    Ambulatory referral to Pulmonology    Wheezes        Relevant Medications    predniSONE 20 mg tablet    Upper respiratory tract infection, unspecified type        Relevant Medications    albuterol (PROVENTIL HFA,VENTOLIN HFA) 90 mcg/act inhaler            Subjective:      Patient ID: Suyapa Yañez is a 48 y o  male  Patient is being seen with continued complaints of shortness of breath  Tested positive for COVID-19 virus in  January  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  Had a lot of difficulty with shortness of breath on exertion  Did have chest x-rays that were negative  Denies any fevers or chills    Feels a little bit of stress with the mask and unable to breathe with ambulation      The following portions of the patient's history were reviewed and updated as appropriate: allergies, current medications, past family history, past medical history, past social history, past surgical history and problem list     Review of Systems   Constitutional: Negative  HENT: Negative  Eyes: Negative  Respiratory: Positive for chest tightness and shortness of breath  Cardiovascular: Negative  Gastrointestinal: Negative  Endocrine: Negative  Genitourinary: Negative  Musculoskeletal: Negative  Skin: Negative  Allergic/Immunologic: Negative  Neurological: Negative  Hematological: Negative  Psychiatric/Behavioral: The patient is nervous/anxious  Objective:      /78 (BP Location: Left arm, Patient Position: Sitting)   Pulse 99   Temp (!) 97 °F (36 1 °C) (Tympanic)   Ht 6' 3" (1 905 m)   Wt (!) 165 kg (363 lb)   SpO2 94%   BMI 45 37 kg/m²          Physical Exam  Vitals signs and nursing note reviewed  Constitutional:       Appearance: He is well-developed  He is obese  HENT:      Head: Normocephalic and atraumatic  Neck:      Musculoskeletal: Normal range of motion and neck supple  Cardiovascular:      Rate and Rhythm: Regular rhythm  Tachycardia present  Pulmonary:      Breath sounds: Examination of the right-middle field reveals decreased breath sounds  Examination of the left-middle field reveals wheezing  Examination of the right-lower field reveals decreased breath sounds  Examination of the left-lower field reveals wheezing  Decreased breath sounds and wheezing present  No rhonchi or rales  Chest:      Chest wall: No tenderness  There is no dullness to percussion  Musculoskeletal: Normal range of motion  Skin:     General: Skin is warm and dry  Neurological:      Mental Status: He is alert and oriented to person, place, and time     Psychiatric:         Attention and Perception: Attention and perception normal          Mood and Affect: Mood is anxious  Speech: Speech is rapid and pressured  Behavior: Behavior normal  Behavior is not agitated  Thought Content:  Thought content normal          Cognition and Memory: Cognition and memory normal            Labs:    No results found for: WBC, HGB, HCT, MCV, PLT  No results found for: NA, K, CL, CO2, ANIONGAP, BUN, CREATININE, GLUCOSE, GLUF, CALCIUM, CORRECTEDCA, AST, ALT, ALKPHOS, PROT, BILITOT, EGFR  No results found for: GLUCOSE, CALCIUM, NA, K, CO2, CL, BUN, CREATININE

## 2021-03-03 NOTE — ASSESSMENT & PLAN NOTE
Continues to have shortness of breath especially with exertion  Pulmonary referral made  Work note given  Patient does have wheezes especially on the left bases  Prednisone therapy prescribed  Continue albuterol for acute episodes of shortness of breath

## 2021-03-03 NOTE — PATIENT INSTRUCTIONS
Pulmonologist   Note given for work    use BuSpar  5 mg every 8 hours as needed right upper home for may take 10 mg no

## 2021-03-24 ENCOUNTER — OFFICE VISIT (OUTPATIENT)
Dept: FAMILY MEDICINE CLINIC | Facility: CLINIC | Age: 51
End: 2021-03-24
Payer: COMMERCIAL

## 2021-03-24 VITALS
HEART RATE: 83 BPM | DIASTOLIC BLOOD PRESSURE: 76 MMHG | SYSTOLIC BLOOD PRESSURE: 118 MMHG | OXYGEN SATURATION: 91 % | HEIGHT: 75 IN | WEIGHT: 315 LBS | BODY MASS INDEX: 39.17 KG/M2 | RESPIRATION RATE: 18 BRPM | TEMPERATURE: 97.1 F

## 2021-03-24 DIAGNOSIS — E88.81 INSULIN RESISTANCE: Primary | ICD-10-CM

## 2021-03-24 DIAGNOSIS — E78.5 HYPERLIPIDEMIA, UNSPECIFIED HYPERLIPIDEMIA TYPE: ICD-10-CM

## 2021-03-24 DIAGNOSIS — Z12.11 SCREENING FOR COLON CANCER: ICD-10-CM

## 2021-03-24 DIAGNOSIS — R06.02 SOB (SHORTNESS OF BREATH): ICD-10-CM

## 2021-03-24 DIAGNOSIS — E66.01 CLASS 3 SEVERE OBESITY DUE TO EXCESS CALORIES WITHOUT SERIOUS COMORBIDITY WITH BODY MASS INDEX (BMI) OF 45.0 TO 49.9 IN ADULT (HCC): ICD-10-CM

## 2021-03-24 DIAGNOSIS — Z20.822 CLOSE EXPOSURE TO COVID-19 VIRUS: ICD-10-CM

## 2021-03-24 PROCEDURE — 1036F TOBACCO NON-USER: CPT | Performed by: FAMILY MEDICINE

## 2021-03-24 PROCEDURE — 3008F BODY MASS INDEX DOCD: CPT | Performed by: FAMILY MEDICINE

## 2021-03-24 PROCEDURE — 99214 OFFICE O/P EST MOD 30 MIN: CPT | Performed by: FAMILY MEDICINE

## 2021-03-24 RX ORDER — ROSUVASTATIN CALCIUM 5 MG/1
5 TABLET, COATED ORAL DAILY
Qty: 30 TABLET | Refills: 5 | Status: SHIPPED | OUTPATIENT
Start: 2021-03-24 | End: 2021-05-25 | Stop reason: SDUPTHER

## 2021-03-24 NOTE — PROGRESS NOTES
BMI Counseling: Body mass index is 45 25 kg/m²  The BMI is above normal  Nutrition recommendations include decreasing portion sizes, encouraging healthy choices of fruits and vegetables, decreasing fast food intake, consuming healthier snacks, limiting drinks that contain sugar, moderation in carbohydrate intake, increasing intake of lean protein, reducing intake of saturated and trans fat and reducing intake of cholesterol  Exercise recommendations include moderate physical activity 150 minutes/week and exercising 3-5 times per week  No pharmacotherapy was ordered  Assessment/Plan:     Chronic Problems:  No problem-specific Assessment & Plan notes found for this encounter  Visit Diagnosis:  Diagnoses and all orders for this visit:    Insulin resistance  -     metFORMIN (GLUCOPHAGE) 500 mg tablet; Take 1 tablet (500 mg total) by mouth 2 (two) times a day with meals    Hyperlipidemia, unspecified hyperlipidemia type  -     rosuvastatin (CRESTOR) 5 mg tablet; Take 1 tablet (5 mg total) by mouth daily    Class 3 severe obesity due to excess calories without serious comorbidity with body mass index (BMI) of 45 0 to 49 9 in adult (HCC)  -     metFORMIN (GLUCOPHAGE) 500 mg tablet;  Take 1 tablet (500 mg total) by mouth 2 (two) times a day with meals    SOB (shortness of breath)    Close exposure to COVID-19 virus     shortness of breath/exposure COVID   request records employee health/previous evaluations   schedule maintain evaluation with Pulmonary   call for any changes worsening   insulin resistance/hyperlipidemia/ obesity   discussed in reviewed plan of care stressed the importance of weight loss program offered weight management eval, start metformin 500 milligrams 1 tablet p o  b i d    hyperlipidemia   start Crestor 5 milligrams 1 tab p o  q day, reviewed dosing side effects cautions warnings once again stressed importance of dietary management      Subjective:    Patient ID: Maryann Woods is a 48 y o  male  F/u   Exercise tolerance has been steadily increasing but yet when wearing a mask still with "something" in regard to tolerance , and performance,    all of started after close encounter with COVID, seen initially by employee health, per patient COVID testing was negative at that time  does have upcoming appt with pulmonary    denies chest pain palpitations shortness of breath at rest, negative swelling to extremities nausea vomiting  Weight has lost 6 pds , understands weight has been excessive, regards as a priority to return to previous weight goal      The following portions of the patient's history were reviewed and updated as appropriate: allergies, current medications, past family history, past medical history, past social history, past surgical history and problem list     Review of Systems   Constitutional: Negative for appetite change, chills, fever and unexpected weight change  HENT: Negative for congestion, dental problem, ear pain, hearing loss, postnasal drip, rhinorrhea, sinus pressure, sinus pain, sneezing, sore throat, tinnitus and voice change  Eyes: Negative for visual disturbance  Respiratory: Negative for apnea, cough, chest tightness and shortness of breath  Cardiovascular: Negative for chest pain, palpitations and leg swelling  Gastrointestinal: Negative for abdominal pain, blood in stool, constipation, diarrhea, nausea and vomiting  Endocrine: Negative for cold intolerance, heat intolerance, polydipsia, polyphagia and polyuria  Genitourinary: Negative for decreased urine volume, difficulty urinating, dysuria, frequency and hematuria  Musculoskeletal: Negative for arthralgias, back pain, gait problem, joint swelling and myalgias  Skin: Negative for color change, rash and wound  Allergic/Immunologic: Negative for environmental allergies and food allergies  Neurological: Negative for dizziness, syncope, weakness, light-headedness, numbness and headaches  Hematological: Negative for adenopathy  Does not bruise/bleed easily  Psychiatric/Behavioral: Negative for sleep disturbance and suicidal ideas  The patient is not nervous/anxious  /76 (BP Location: Left arm, Patient Position: Sitting, Cuff Size: Adult)   Pulse 83   Temp (!) 97 1 °F (36 2 °C)   Resp 18   Ht 6' 3" (1 905 m)   Wt (!) 164 kg (362 lb)   SpO2 91%   BMI 45 25 kg/m²   Social History     Socioeconomic History    Marital status: /Civil Union     Spouse name: Not on file    Number of children: Not on file    Years of education: Not on file    Highest education level: Not on file   Occupational History    Not on file   Social Needs    Financial resource strain: Not on file    Food insecurity     Worry: Not on file     Inability: Not on file   [employer] needs     Medical: Not on file     Non-medical: Not on file   Tobacco Use    Smoking status: Former Smoker    Smokeless tobacco: Never Used   Substance and Sexual Activity    Alcohol use: Yes     Frequency: 2-3 times a week     Drinks per session: 1 or 2     Binge frequency: Never    Drug use: Never    Sexual activity: Yes   Lifestyle    Physical activity     Days per week: Not on file     Minutes per session: Not on file    Stress: Not on file   Relationships    Social connections     Talks on phone: Not on file     Gets together: Not on file     Attends Mu-ism service: Not on file     Active member of club or organization: Not on file     Attends meetings of clubs or organizations: Not on file     Relationship status: Not on file    Intimate partner violence     Fear of current or ex partner: Not on file     Emotionally abused: Not on file     Physically abused: Not on file     Forced sexual activity: Not on file   Other Topics Concern    Not on file   Social History Narrative    Not on file     History reviewed  No pertinent past medical history  History reviewed   No pertinent family history  History reviewed  No pertinent surgical history  Current Outpatient Medications:     albuterol (PROVENTIL HFA,VENTOLIN HFA) 90 mcg/act inhaler, Inhale 2 puffs every 6 (six) hours as needed for wheezing or shortness of breath, Disp: 1 Inhaler, Rfl: 5    fluticasone (FLONASE) 50 mcg/act nasal spray, 1 spray into each nostril daily, Disp: 1 Bottle, Rfl: 3    sildenafil (VIAGRA) 50 MG tablet, Take 1 tablet (50 mg total) by mouth daily as needed for erectile dysfunction, Disp: 10 tablet, Rfl: 0    busPIRone (BUSPAR) 5 mg tablet, Take 1 tablet (5 mg total) by mouth 3 (three) times a day (Patient not taking: Reported on 3/24/2021), Disp: 30 tablet, Rfl: 0    metFORMIN (GLUCOPHAGE) 500 mg tablet, Take 1 tablet (500 mg total) by mouth 2 (two) times a day with meals, Disp: 60 tablet, Rfl: 5    predniSONE 20 mg tablet, Take 1 tablet (20 mg total) by mouth daily (Patient not taking: Reported on 3/24/2021), Disp: 5 tablet, Rfl: 0    rosuvastatin (CRESTOR) 5 mg tablet, Take 1 tablet (5 mg total) by mouth daily, Disp: 30 tablet, Rfl: 5    No Known Allergies       Lab Review   No visits with results within 6 Month(s) from this visit  Latest known visit with results is:   No results found for any previous visit  Imaging: No results found  Objective:     Physical Exam  Constitutional:       General: He is not in acute distress  Appearance: He is well-developed  He is obese  He is not ill-appearing or toxic-appearing  HENT:      Head: Normocephalic and atraumatic  Neck:      Musculoskeletal: Normal range of motion and neck supple  Cardiovascular:      Rate and Rhythm: Normal rate and regular rhythm  Heart sounds: Normal heart sounds  Pulmonary:      Effort: Pulmonary effort is normal       Breath sounds: Normal breath sounds  Musculoskeletal: Normal range of motion  Right lower leg: No edema  Left lower leg: No edema  Skin:     General: Skin is warm and dry  Neurological:      Mental Status: He is alert and oriented to person, place, and time  Deep Tendon Reflexes: Reflexes are normal and symmetric  Psychiatric:         Behavior: Behavior normal          Thought Content: Thought content normal          Judgment: Judgment normal            There are no Patient Instructions on file for this visit  WES Esposito    Portions of the record may have been created with voice recognition software  Occasional wrong word or "sound a like" substitutions may have occurred due to the inherent limitations of voice recognition software  Read the chart carefully and recognize, using context, where substitutions have occurred

## 2021-03-30 ENCOUNTER — TELEPHONE (OUTPATIENT)
Dept: GASTROENTEROLOGY | Facility: CLINIC | Age: 51
End: 2021-03-30

## 2021-03-30 NOTE — TELEPHONE ENCOUNTER
Jed Barney patient - Patient has referral for a colonoscopy and is not having issues  Please call to see about direct scheduling @ [phone]  Thxs

## 2021-03-30 NOTE — TELEPHONE ENCOUNTER
03/30/21  Screened by: Nessa Thomas    Referring Provider Blayne Charles    Pre- Screening: There is no height or weight on file to calculate BMI  Has patient been referred for a routine screening Colonoscopy? yes  Is the patient between 39-70 years old? yes      Previous Colonoscopy no   If yes:    Date:     Facility:     Reason:       SCHEDULING STAFF: If the patient is between 45yrs-49yrs, please advise patient to confirm benefits/coverage with their insurance company for a routine screening colonoscopy, some insurance carriers will only cover at [address] or older  If the patient is over 66years old, please schedule an office visit  Does the patient want to see a Gastroenterologist prior to their procedure OR are they having any GI symptoms? no    Has the patient been hospitalized or had abdominal surgery in the past 6 months? no    Does the patient use supplemental oxygen? no    Does the patient take Coumadin, Lovenox, Plavix, Elliquis, Xarelto, or other blood thinning medication? no    Has the patient had a stroke, cardiac event, or stent placed in the past year? no    SCHEDULING STAFF: If patient answers NO to above questions, then schedule procedure  If patient answers YES to above questions, then schedule office appointment  If patient is between 45yrs - 49yrs, please advise patient that we will have to confirm benefits & coverage with their insurance company for a routine screening colonoscopy

## 2021-04-06 ENCOUNTER — OFFICE VISIT (OUTPATIENT)
Dept: BARIATRICS | Facility: CLINIC | Age: 51
End: 2021-04-06
Payer: COMMERCIAL

## 2021-04-06 VITALS
DIASTOLIC BLOOD PRESSURE: 84 MMHG | SYSTOLIC BLOOD PRESSURE: 124 MMHG | TEMPERATURE: 98.2 F | HEIGHT: 73 IN | WEIGHT: 315 LBS | HEART RATE: 83 BPM | BODY MASS INDEX: 41.75 KG/M2

## 2021-04-06 DIAGNOSIS — E78.2 MIXED HYPERLIPIDEMIA: ICD-10-CM

## 2021-04-06 DIAGNOSIS — E88.81 INSULIN RESISTANCE: ICD-10-CM

## 2021-04-06 DIAGNOSIS — E66.01 MORBID OBESITY (HCC): Primary | ICD-10-CM

## 2021-04-06 DIAGNOSIS — F41.9 ANXIETY: ICD-10-CM

## 2021-04-06 DIAGNOSIS — R73.03 PREDIABETES: ICD-10-CM

## 2021-04-06 PROCEDURE — 99244 OFF/OP CNSLTJ NEW/EST MOD 40: CPT | Performed by: INTERNAL MEDICINE

## 2021-04-06 PROCEDURE — 3008F BODY MASS INDEX DOCD: CPT | Performed by: FAMILY MEDICINE

## 2021-04-06 NOTE — PATIENT INSTRUCTIONS
Goals:  Do not skip any meals! Food log (ie ) www myfitnesspal com,sparkpeople  com,loseit com,calorieking  com,etc  baritastic (use skinnytaste  com or smartphone kathy Ganipara for recipes)  No sugary beverages  At least 64oz of water daily  Increase physical activity by 10 minutes daily   Gradually increase physical activity to a goal of 5 days per week for 30 minutes of MODERATE intensity PLUS 2 days per week of FULL BODY resistance training (use smartphone apps Care at Hand, Home Workout, etc )  8677-1826 calories  Try 647 bread  Pasta- chickpeas or blackbeans  Try Premier protein, [place], Pure Protein Granular

## 2021-04-06 NOTE — PROGRESS NOTES
Assessment/Plan:  Yasir Díaz was seen today for consult  Diagnoses and all orders for this visit:    Mixed hyperlipidemia  Increase activity level to 150 minutes of moderate to intense exercise per week  Weight loss should help improve the lipid levels  Avoid foods with trans fat, limit saturated fats and refined carbohydrates  Increase fish/omega 3 consumption    Anxiety  Controlled on buspar    Morbid obesity (HCC)  Prediabetes  Insulin resistance  -     Semaglutide 3 MG TABS; Take 3 mg by mouth daily    - Discussed options of HealthyCORE-Intensive Lifestyle Intervention Program, Very Low Calorie Diet-VLCD, Conservative Program, Jr-En-Y Gastric Bypass and Vertical Sleeve Gastrectomy and the role of weight loss medications  - Explained the importance of making lifestyle changes first before starting any anti-obesity medications  Patient should demonstrate lifestyle changes first before anti-obesity medication can be initiated  - Patient is interested in pursuing Conservative Program   Potentially to consider surgery in the future if weight loss if not successful with medical wt mgmt  - Initial weight loss goal of 5-10% weight loss for improved health  - Screening labs reviewed      45 minute visit, >50% face-to-face time spent counseling patient on surgical and nonsurgical interventions for the treatment of excess weight  Discussed the advantages and long-term outcomes with regards to bariatric surgery  Discussed in detail nonsurgical options including intensive lifestyle intervention program, very low-calorie diet program and conservative program   Discussed the role of weight loss medications  Counseled patient on diet behavior and exercise modification for weight loss  Follow up in approximately 6 weeks with Non-Surgical Physician/Advanced Practitioner  Subjective:   Chief Complaint   Patient presents with    Consult     Patient is here for initial MWM consult with Dr Yolanda Nettles  BMI 48 33    Negative stop bang (3/8)  Patient ID: Artalan Sanford  is a 48 y o  male with excess weight/obesity here to pursue weight management  Past Medical History:   Diagnosis Date    High cholesterol     Obesity, Class III, BMI 40-49 9 (morbid obesity) ([facility] [address] )      Past Surgical History:   Procedure Laterality Date    APPENDECTOMY  1987    SHOULDER SURGERY  [id number]    several       HPI:  Wt Readings from Last 20 Encounters:   04/06/21 (!) 167 kg (368 lb 12 8 oz)   03/24/21 (!) 164 kg (362 lb)   03/03/21 (!) 165 kg (363 lb)   02/23/21 (!) 168 kg (370 lb)   03/23/20 (!) 143 kg (315 lb)       Severity: Very Severe  Onset: last year gained 50 lbs   Modifiers: Diet and Exercise  Contributing factors: Poor Food Choices and Insufficient Physical Activity  Associated symptoms: comorbid conditions  Goal weight: 5-10% STG, eventually <405  Is a  and also works as EMT- doing patrol part time, 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  Didn't tolerate keto diet last year as he felt very shaky and did not feel well  Had COVID in January, has been feeling short of breath with exertion since, has been off work since January  Loves carbs but has been trying to cut down and stopped drinking soda for 6 months  Has not seen great weight loss  Always feels hungry  Works nights, used to skip meals and eat a big meal before going to bed     B- skipped   S-   L- ham and cheese sandwich  S-  D- ravioli   S-  Hydration: more than 64 oz  Alcohol: 6-8 beverages one month  Smoking: no  Exercise: walking daily  Sleep: broken sleep  STOP bang: 3/8    The following portions of the patient's history were reviewed and updated as appropriate: allergies, current medications, past family history, past medical history, past social history, past surgical history, and problem list     Review of Systems   Constitutional: Negative for appetite change, chills and fever  HENT: Negative for rhinorrhea and sore throat      Respiratory: Positive for shortness of breath (with exertion)  Negative for cough and chest tightness  Cardiovascular: Negative for chest pain and leg swelling  Gastrointestinal: Negative for abdominal pain, constipation, diarrhea, nausea and vomiting  Endocrine: Negative for cold intolerance and heat intolerance  Genitourinary: Negative for difficulty urinating  Musculoskeletal: Negative for arthralgias  Skin: Negative for color change  Neurological: Negative for dizziness, numbness and headaches  Psychiatric/Behavioral: Negative for sleep disturbance  The patient is not nervous/anxious  All other systems reviewed and are negative  Objective:  /84 (BP Location: Right arm, Patient Position: Sitting, Cuff Size: Large)   Pulse 83   Temp 98 2 °F (36 8 °C) (Temporal)   Ht 6' 1 25" (1 861 m)   Wt (!) 167 kg (368 lb 12 8 oz)   BMI 48 33 kg/m²   Constitutional: Well-developed, well-nourished and obese Body mass index is 48 33 kg/m²  Eder Heckler HEENT: No conjunctival pallor or jaundice  Pulmonary: No increased work of breathing or signs of respiratory distress  CV: Normal rate and rhythm  GI: Obese  Normal bowel sounds  Soft and nontender  MSK: No edema   Neuro: Oriented to person, place and time  Normal Speech  Normal gait  Psych: Normal affect and mood       Labs and Imaging  No results found for: WBC, HGB, HCT, MCV, PLT  No results found for: NA, SODIUM, K, CL, CO2, ANIONGAP, AGAP, BUN, CREATININE, GLUC, GLUF, CALCIUM, AST, ALT, ALKPHOS, PROT, TP, BILITOT, TBILI, EGFR  No results found for: HGBA1C  No results found for: [id number], TSH  No results found for: CHOLESTEROL  No results found for: HDL  No results found for: TRIG  No results found for: LDLDIRECT        Colonoscopy-appt coming up

## 2021-04-07 ENCOUNTER — TELEPHONE (OUTPATIENT)
Dept: BARIATRICS | Facility: CLINIC | Age: 51
End: 2021-04-07

## 2021-04-10 DIAGNOSIS — J06.9 UPPER RESPIRATORY TRACT INFECTION, UNSPECIFIED TYPE: ICD-10-CM

## 2021-04-12 ENCOUNTER — TELEPHONE (OUTPATIENT)
Dept: GASTROENTEROLOGY | Facility: CLINIC | Age: 51
End: 2021-04-12

## 2021-04-12 RX ORDER — FLUTICASONE PROPIONATE 50 MCG
SPRAY, SUSPENSION (ML) NASAL
Qty: 16 ML | Refills: 3 | Status: SHIPPED | OUTPATIENT
Start: 2021-04-12

## 2021-04-12 NOTE — TELEPHONE ENCOUNTER
Dr Edy Abbasi patient - patient needs to reschedule his procedure due to insurance   Please call Manuel at [phone] ty

## 2021-04-12 NOTE — TELEPHONE ENCOUNTER
-- DO NOT REPLY / DO NOT REPLY ALL --  -- Message is from the [facility]--    COVID-19 Universal Screening: N/A - Not about scheduling    General Patient Message      Reason for Call: Patient wants her provider to know there is no pre op it is a in person visit at the eye doctor    Caller Information       Type Contact Phone    04/12/2021 08:16 AM CDT Phone (Incoming) Zoe Valdesy (Self) [phone] (M)          Alternative phone number: none    Turnaround time given to caller:   \"This message will be sent to [state Provider's name]. The clinical team will fulfill your request as soon as they review your message.\"     Irinoe Vazquez said the patient can return to work on 1/25/2021  I made patients letter and sent it to patient  I left a voicemail for patient that letter was sent

## 2021-04-13 ENCOUNTER — TELEMEDICINE (OUTPATIENT)
Dept: FAMILY MEDICINE CLINIC | Facility: CLINIC | Age: 51
End: 2021-04-13
Payer: COMMERCIAL

## 2021-04-13 DIAGNOSIS — R06.02 SOB (SHORTNESS OF BREATH): Primary | ICD-10-CM

## 2021-04-13 DIAGNOSIS — J06.9 UPPER RESPIRATORY TRACT INFECTION, UNSPECIFIED TYPE: ICD-10-CM

## 2021-04-13 DIAGNOSIS — E66.01 CLASS 3 SEVERE OBESITY DUE TO EXCESS CALORIES WITHOUT SERIOUS COMORBIDITY WITH BODY MASS INDEX (BMI) OF 45.0 TO 49.9 IN ADULT (HCC): ICD-10-CM

## 2021-04-13 PROCEDURE — 1036F TOBACCO NON-USER: CPT | Performed by: FAMILY MEDICINE

## 2021-04-13 PROCEDURE — 99214 OFFICE O/P EST MOD 30 MIN: CPT | Performed by: FAMILY MEDICINE

## 2021-04-13 NOTE — PROGRESS NOTES
Virtual Regular Visit      Assessment/Plan:    Problem List Items Addressed This Visit        Other    SOB (shortness of breath) - Primary    Relevant Orders    Echo complete with contrast if indicated    SARS-CoV-2 Serology (COVID-19) Antibodies (IgG, IgM), Immunoassay    CBC      Other Visit Diagnoses     Upper respiratory tract infection, unspecified type        Relevant Orders    SARS-CoV-2 Serology (COVID-19) Antibodies (IgG, IgM), Immunoassay    CBC    Class 3 severe obesity due to excess calories without serious comorbidity with body mass index (BMI) of 45 0 to 49 9 in adult [facility])           URI, viral/  Exercise intolerance   discussed issues concerns initial symptoms began after exposure to COVID January 2021   shortness of breath/ intolerance wearing protective mask, has attempted to seek eval with Pulmonary restricted to scheduling, has made self reported improvement gradually over this period of time at no time recurrence of fever chills chest pain palpitations swelling to extremities, for thoroughness will obtain 2D echo, chest x-ray was normal will obtain COVID antibodies , request records pulmonary 1 accomplished   obesity   has made contact with weight management specialty, to continue following establish plan of care         Reason for visit is   Chief Complaint   Patient presents with    Virtual Regular Visit        Encounter provider WES Pagan    Provider located at [facility] [address] [place]  [address]  [facility] [id number] [phone]      Recent Visits  No visits were found meeting these conditions     Showing recent visits within past 7 days and meeting all other requirements     Today's Visits  Date Type Provider Dept   04/13/21 Telemedicine WES Pagan [address] today's visits and meeting all other requirements     Future Appointments  No visits were found meeting these conditions  Showing future appointments within next 150 days and meeting all other requirements        The patient was identified by name and date of birth  Chani Ortiz was informed that this is a telemedicine visit and that the visit is being conducted through [employer] and patient was informed that this is not a secure, HIPAA-compliant platform  He agrees to proceed     My office door was closed  No one else was in the room  He acknowledged consent and understanding of privacy and security of the video platform  The patient has agreed to participate and understands they can discontinue the visit at any time  Patient is aware this is a billable service  Subjective  Chani Ortiz is a 48 y o  male         F/u (post covid , initial s/s began dec 28 after close exposure (partner +)  , jan 4   Seen in occupational health  , where test completed neg swab ,   Has been out of work since that point Pulmonary f/u scheduled for 11 may , in regard to chronic cough related to exposure   Employee health - , neg workmans comp at that time was not offered any additional assitance   Currently on   Still not able to tolerate wearing the mask 02 for job   Overall feeling better , still feels hesitant , able to perform activities home with family without wearing the mask, walking/ hiking with son  Neg cp , palpitations ,  Swelling to extremities  Admittedly gets winded while wearing the mask   Weight management  Oral ozempic tolerating , also metformiin  Also presently dealing with seasonal allergies which to some degree causing post nasal and head congestion ,            Past Medical History:   Diagnosis Date    High cholesterol     Obesity, Class III, BMI 40-49 9 (morbid obesity) ([facility] [address] )        Past Surgical History:   Procedure Laterality Date    APPENDECTOMY  1987    SHOULDER SURGERY  [id number]    several       Current Outpatient Medications   Medication Sig Dispense Refill    albuterol (PROVENTIL HFA,VENTOLIN HFA) 90 mcg/act inhaler Inhale 2 puffs every 6 (six) hours as needed for wheezing or shortness of breath 1 Inhaler 5    busPIRone (BUSPAR) 5 mg tablet Take 1 tablet (5 mg total) by mouth 3 (three) times a day (Patient not taking: Reported on 3/24/2021) 30 tablet 0    fluticasone (FLONASE) 50 mcg/act nasal spray SPRAY 1 SPRAY INTO EACH NOSTRIL EVERY DAY 16 mL 3    metFORMIN (GLUCOPHAGE) 500 mg tablet Take 1 tablet (500 mg total) by mouth 2 (two) times a day with meals 60 tablet 5    predniSONE 20 mg tablet Take 1 tablet (20 mg total) by mouth daily (Patient not taking: Reported on 3/24/2021) 5 tablet 0    rosuvastatin (CRESTOR) 5 mg tablet Take 1 tablet (5 mg total) by mouth daily 30 tablet 5    Semaglutide 3 MG TABS Take 3 mg by mouth daily 30 tablet 0    sildenafil (VIAGRA) 50 MG tablet Take 1 tablet (50 mg total) by mouth daily as needed for erectile dysfunction (Patient not taking: Reported on 4/6/2021) 10 tablet 0     No current facility-administered medications for this visit  No Known Allergies    Review of Systems    Video Exam    There were no vitals filed for this visit  Physical Exam     I spent 20 minutes directly with the patient during this visit      VIRTUAL VISIT DISCLAIMER    Edu Desouza acknowledges that he has consented to an online visit or consultation  He understands that the online visit is based solely on information provided by him, and that, in the absence of a face-to-face physical evaluation by the physician, the diagnosis he receives is both limited and provisional in terms of accuracy and completeness  This is not intended to replace a full medical face-to-face evaluation by the physician  Edu Desouza understands and accepts these terms

## 2021-04-26 ENCOUNTER — TELEPHONE (OUTPATIENT)
Dept: FAMILY MEDICINE CLINIC | Facility: CLINIC | Age: 51
End: 2021-04-26

## 2021-05-11 ENCOUNTER — CONSULT (OUTPATIENT)
Dept: PULMONOLOGY | Facility: CLINIC | Age: 51
End: 2021-05-11
Payer: COMMERCIAL

## 2021-05-11 VITALS
HEART RATE: 97 BPM | HEIGHT: 73 IN | OXYGEN SATURATION: 93 % | SYSTOLIC BLOOD PRESSURE: 124 MMHG | BODY MASS INDEX: 41.75 KG/M2 | DIASTOLIC BLOOD PRESSURE: 84 MMHG | WEIGHT: 315 LBS | TEMPERATURE: 97.5 F

## 2021-05-11 DIAGNOSIS — E66.01 MORBID OBESITY (HCC): ICD-10-CM

## 2021-05-11 DIAGNOSIS — J45.30 MILD PERSISTENT ASTHMA WITHOUT COMPLICATION: ICD-10-CM

## 2021-05-11 DIAGNOSIS — R06.02 SOB (SHORTNESS OF BREATH) ON EXERTION: Primary | ICD-10-CM

## 2021-05-11 DIAGNOSIS — Z20.822 CLOSE EXPOSURE TO COVID-19 VIRUS: ICD-10-CM

## 2021-05-11 PROCEDURE — 99244 OFF/OP CNSLTJ NEW/EST MOD 40: CPT | Performed by: INTERNAL MEDICINE

## 2021-05-11 NOTE — LETTER
May 11, 2021     Patient: Heather Steele   YOB: 1970   Date of Visit: 5/11/2021       To Whom it May Concern:    Heather Steele is under my professional care  He was seen in my office on 5/11/2021  He can return to work in 2 weeks     If you have any questions or concerns, please don't hesitate to call           Sincerely,          Sammy Blankenship MD        CC: Heather Steele

## 2021-05-11 NOTE — PROGRESS NOTES
Assessment/Plan:     Diagnoses and all orders for this visit:    SOB (shortness of breath) on exertion  -     Complete PFT with post Bronchodilator and Six Minute walk; Future  -     CT chest high resolution; Future    Close exposure to COVID-19 virus  -     Ambulatory referral to Pulmonology    Mild persistent asthma without complication  -     fluticasone-salmeterol (Wixela Inhub) 500-50 mcg/dose inhaler; Inhale 1 puff 2 (two) times a day Rinse mouth after use  Morbid obesity (HCC)        Shortness of breath and dyspnea on exertion in this patient with seasonal allergies as well as a hay fever, and recent COVID-19 infection of to airway disease versus in the lung  And shortness of breath dyspnea on exertion likely also contributed by his morbid obesity and likely untreated sleep apnea pulmonary hypertension  He does have an echocardiogram ordered by his PCP will follow-up  Will get complete PFT with post bronchodilator and a 6 minutes walk test  CT chest high-resolution and follow-up his recent chest x-ray in January of 2021 was normal   Discussed with the patient will add long-acting inhaler fluticasone salmeterol 1 puff twice daily  Rinse mouth after use  Continue with albuterol MDI 2 puffs 4 times daily as needed  Clinical suspicion patient for obstructive sleep apnea he states he would like to think about the test in the S again  I will see him back in 6 weeks with the above testing  Or p r n  Earlier as needed  Return in about 6 weeks (around 6/22/2021)  All questions are answered to the patient's satisfaction and understanding  He verbalizes understanding  He is encouraged to call with any further questions or concerns  Portions of the record may have been created with voice recognition software  Occasional wrong word or "sound a like" substitutions may have occurred due to the inherent limitations of voice recognition software    Read the chart carefully and recognize, using context, where substitutions have occurred  a    Electronically Signed by Lyn Nguyen MD    ______________________________________________________________________    Chief Complaint:   Chief Complaint   Patient presents with    Shortness of Breath    Wheezing    Cough        Patient ID: Alexandra Holder is a 48 y o  y o  male has a past medical history of Cough, High cholesterol, Obesity, Class III, BMI 40-49 9 (morbid obesity) ([address] ), SOB (shortness of breath), and Wheezing     5/11/2021  Patient presents today for initial visit  Alexandra Holder is a very pleasant 19-year-old gentleman, former smoker with less than 10 pack-year smoking history who quit a year ago, who is a  and a  states he has been doing 2 jobs for several years, until he contracted COVID-23 infection in December of 2020 states he was not hospitalized he could manage it at home with supportive treatment as well as the steroids he states, he was also given an inhaler given he was having shortness of breath and wheezing he states he has been having asthma in the past with allergies especially hay fever and and when his wheezing was bad after the COVID-19 infection he was given albuterol inhaler which he has been using once or twice a day he states  And he states slowly his shortness of breath is getting better but he has it has been very slow he had a chest x-ray done in February which was normal         Occupational/Exposure history:   and a   Pets/Enviroment: none  Travel history:none  Review of Systems   Constitutional: Positive for fatigue  HENT: Negative  Eyes: Negative  Respiratory: Positive for shortness of breath  Cardiovascular: Negative  Gastrointestinal: Negative  Musculoskeletal: Negative  Allergic/Immunologic: Positive for environmental allergies  Neurological: Negative  Hematological: Negative  Psychiatric/Behavioral: Negative          Social history: He reports that he quit smoking about 16 months ago  His smoking use included cigarettes  He started smoking about 31 years ago  He has a 7 50 pack-year smoking history  He has never used smokeless tobacco  He reports current alcohol use  He reports that he does not use drugs  Past surgical history:   Past Surgical History:   Procedure Laterality Date    APPENDECTOMY  26    SHOULDER SURGERY  [id number]    several     Family history:   Family History   Problem Relation Age of Onset    Drug abuse Mother     Lung cancer Father          There is no immunization history on file for this patient  Current Outpatient Medications   Medication Sig Dispense Refill    albuterol (PROVENTIL HFA,VENTOLIN HFA) 90 mcg/act inhaler Inhale 2 puffs every 6 (six) hours as needed for wheezing or shortness of breath 1 Inhaler 5    fluticasone (FLONASE) 50 mcg/act nasal spray SPRAY 1 SPRAY INTO EACH NOSTRIL EVERY DAY 16 mL 3    metFORMIN (GLUCOPHAGE) 500 mg tablet Take 1 tablet (500 mg total) by mouth 2 (two) times a day with meals 60 tablet 5    rosuvastatin (CRESTOR) 5 mg tablet Take 1 tablet (5 mg total) by mouth daily 30 tablet 5    Semaglutide 3 MG TABS Take 3 mg by mouth daily 30 tablet 0    busPIRone (BUSPAR) 5 mg tablet Take 1 tablet (5 mg total) by mouth 3 (three) times a day (Patient not taking: Reported on 3/24/2021) 30 tablet 0    fluticasone-salmeterol (Wixela Inhub) 500-50 mcg/dose inhaler Inhale 1 puff 2 (two) times a day Rinse mouth after use  1 Inhaler 3    predniSONE 20 mg tablet Take 1 tablet (20 mg total) by mouth daily (Patient not taking: Reported on 3/24/2021) 5 tablet 0    sildenafil (VIAGRA) 50 MG tablet Take 1 tablet (50 mg total) by mouth daily as needed for erectile dysfunction (Patient not taking: Reported on 4/6/2021) 10 tablet 0     No current facility-administered medications for this visit  Allergies: Patient has no known allergies      Objective:  Vitals:    05/11/21 1017   BP: 124/84   Pulse: 97   Temp: 97 5 °F (36 4 °C) SpO2: 93%   Weight: (!) 166 kg (365 lb)   Height: 6' 1 25" (1 861 m)   Oxygen Therapy  SpO2: 93 %    Wt Readings from Last 3 Encounters:   05/11/21 (!) 166 kg (365 lb)   04/06/21 (!) 167 kg (368 lb 12 8 oz)   03/24/21 (!) 164 kg (362 lb)     Body mass index is 47 83 kg/m²  Physical Exam  Constitutional:       Appearance: He is well-developed  HENT:      Head: Normocephalic and atraumatic  Eyes:      Pupils: Pupils are equal, round, and reactive to light  Neck:      Musculoskeletal: Normal range of motion and neck supple  Comments: Short and wide neck  Cardiovascular:      Rate and Rhythm: Normal rate and regular rhythm  Heart sounds: Normal heart sounds  Pulmonary:      Effort: Pulmonary effort is normal       Breath sounds: Normal breath sounds  Musculoskeletal: Normal range of motion  Skin:     General: Skin is warm and dry  Neurological:      Mental Status: He is alert and oriented to person, place, and time  Psychiatric:         Behavior: Behavior normal            Diagnostics:  I have personally reviewed pertinent reports

## 2021-05-12 DIAGNOSIS — E66.01 MORBID OBESITY (HCC): ICD-10-CM

## 2021-05-12 DIAGNOSIS — R73.03 PREDIABETES: ICD-10-CM

## 2021-05-12 DIAGNOSIS — E88.81 INSULIN RESISTANCE: ICD-10-CM

## 2021-05-12 RX ORDER — ORAL SEMAGLUTIDE 3 MG/1
TABLET ORAL
Qty: 30 TABLET | Refills: 2 | Status: SHIPPED | OUTPATIENT
Start: 2021-05-12 | End: 2021-05-17 | Stop reason: SDUPTHER

## 2021-05-17 ENCOUNTER — TELEPHONE (OUTPATIENT)
Dept: BARIATRICS | Facility: CLINIC | Age: 51
End: 2021-05-17

## 2021-05-17 DIAGNOSIS — R73.03 PREDIABETES: ICD-10-CM

## 2021-05-17 DIAGNOSIS — E66.01 MORBID OBESITY (HCC): ICD-10-CM

## 2021-05-17 DIAGNOSIS — E88.81 INSULIN RESISTANCE: ICD-10-CM

## 2021-05-17 RX ORDER — ORAL SEMAGLUTIDE 3 MG/1
1 TABLET ORAL DAILY
Qty: 30 TABLET | Refills: 2 | Status: SHIPPED | OUTPATIENT
Start: 2021-05-17 | End: 2021-05-20

## 2021-05-17 NOTE — TELEPHONE ENCOUNTER
Can you please send this prescription (Rybelsus) to [facility] rather than the [facility] you did send it to? A fax came through today requesting this

## 2021-05-20 ENCOUNTER — OFFICE VISIT (OUTPATIENT)
Dept: BARIATRICS | Facility: CLINIC | Age: 51
End: 2021-05-20
Payer: COMMERCIAL

## 2021-05-20 VITALS
TEMPERATURE: 98.2 F | DIASTOLIC BLOOD PRESSURE: 80 MMHG | BODY MASS INDEX: 41.75 KG/M2 | SYSTOLIC BLOOD PRESSURE: 110 MMHG | WEIGHT: 315 LBS | HEIGHT: 73 IN | HEART RATE: 89 BPM

## 2021-05-20 DIAGNOSIS — E78.2 MIXED HYPERLIPIDEMIA: ICD-10-CM

## 2021-05-20 DIAGNOSIS — F41.9 ANXIETY: ICD-10-CM

## 2021-05-20 DIAGNOSIS — E88.81 INSULIN RESISTANCE: ICD-10-CM

## 2021-05-20 DIAGNOSIS — R73.03 PREDIABETES: ICD-10-CM

## 2021-05-20 DIAGNOSIS — E66.01 MORBID OBESITY (HCC): Primary | ICD-10-CM

## 2021-05-20 PROCEDURE — 3008F BODY MASS INDEX DOCD: CPT | Performed by: INTERNAL MEDICINE

## 2021-05-20 PROCEDURE — 99214 OFFICE O/P EST MOD 30 MIN: CPT | Performed by: INTERNAL MEDICINE

## 2021-05-20 PROCEDURE — 1036F TOBACCO NON-USER: CPT | Performed by: INTERNAL MEDICINE

## 2021-05-20 RX ORDER — ORAL SEMAGLUTIDE 7 MG/1
7 TABLET ORAL DAILY
Qty: 30 TABLET | Refills: 0 | Status: SHIPPED | OUTPATIENT
Start: 2021-05-20 | End: 2021-06-18

## 2021-05-20 RX ORDER — ORAL SEMAGLUTIDE 14 MG/1
14 TABLET ORAL DAILY
Qty: 30 TABLET | Refills: 3 | Status: SHIPPED | OUTPATIENT
Start: 2021-05-20 | End: 2021-06-18

## 2021-05-20 NOTE — PROGRESS NOTES
Assessment/Plan:  Ariana Ellsworth was seen today for follow-up  Diagnoses and all orders for this visit:    Mixed hyperlipidemia  Increase activity level to 150 minutes of moderate to intense exercise per week  Weight loss should help improve the lipid levels  Avoid foods with trans fat, limit saturated fats and refined carbohydrates  Increase fish/omega 3 consumption     Anxiety  Controlled on buspar      Morbid obesity (HCC)  Prediabetes  Insulin resistance  -     Semaglutide (Rybelsus) 7 MG TABS; Take 7 mg by mouth daily for 7 days  -     Semaglutide (Rybelsus) 14 MG TABS; Take 14 mg by mouth daily Start taking 14mg tablet after finishing 7mg tablet for 30 days  Doing great on Rybelsus and making great lifestyle changes  Goals:  2000 calories   Continue low carb diet    Follow up in approximately 2 months with Non-Surgical Physician/Advanced Practitioner  Subjective:   Chief Complaint   Patient presents with    Follow-up     Patient is here for six week MWM follow-up with Dr Carlos Eduardo Harding  Patient ID: Aleks Cifuentes  is a 48 y o  male with excess weight/obesity here to pursue weight management    Patient is pursuing Conservative Program      HPI  -Initial weight loss goal of 5-10% weight loss for improved health  Wt Readings from Last 10 Encounters:   05/20/21 (!) 164 kg (362 lb 9 6 oz)   05/11/21 (!) 166 kg (365 lb)   04/06/21 (!) 167 kg (368 lb 12 8 oz)   03/24/21 (!) 164 kg (362 lb)   03/03/21 (!) 165 kg (363 lb)   02/23/21 (!) 168 kg (370 lb)   03/23/20 (!) 143 kg (315 lb)     Initial weight: 368 8  Current weight: 362 6   Change in weight: -8 lbs  Goal:  5-10% STG, eventually <300  Limiting carbs a lot  Doing Protein shakes  Hasn't been doing less than 2000 calories  Was off of it for a few weeks due to being unable to get a refill   Not having any side effects   Increased satiety  Doing more protein, more fruits and vegetables     Drinks- 1 gallon water   Alcohol- very rare   Exercise- playing with his dog- Everette, planning on swimming, water activities this summer      The following portions of the patient's history were reviewed and updated as appropriate: allergies, current medications, past family history, past medical history, past social history, past surgical history, and problem list     Review of Systems   Respiratory: Positive for shortness of breath (with exertion since having COVID)  Cardiovascular: Negative  Gastrointestinal: Negative  Musculoskeletal: Negative  Psychiatric/Behavioral: Negative  Objective:  /80 (BP Location: Left arm, Patient Position: Sitting, Cuff Size: Large)   Pulse 89   Temp 98 2 °F (36 8 °C) (Temporal)   Ht 6' 1 2" (1 859 m)   Wt (!) 164 kg (362 lb 9 6 oz)   BMI 47 58 kg/m²   Constitutional: Well-developed, well-nourished and obese Body mass index is 47 58 kg/m²  Kate Blend HEENT: No conjunctival pallor or jaundice  Pulmonary: No increased work of breathing or signs of respiratory distress  CV: Normal rate, well-perfused  GI: Obese  Non-distended  MSK: No edema   Neuro: Oriented to person, place and time  Normal Speech  Normal gait  Psych: Normal affect and mood       Labs and Imaging  Reviewed

## 2021-05-25 ENCOUNTER — TELEPHONE (OUTPATIENT)
Dept: PULMONOLOGY | Facility: CLINIC | Age: 51
End: 2021-05-25

## 2021-05-25 DIAGNOSIS — E78.5 HYPERLIPIDEMIA, UNSPECIFIED HYPERLIPIDEMIA TYPE: ICD-10-CM

## 2021-05-25 DIAGNOSIS — J06.9 UPPER RESPIRATORY TRACT INFECTION, UNSPECIFIED TYPE: ICD-10-CM

## 2021-05-25 DIAGNOSIS — E88.81 INSULIN RESISTANCE: ICD-10-CM

## 2021-05-25 DIAGNOSIS — E66.01 CLASS 3 SEVERE OBESITY DUE TO EXCESS CALORIES WITHOUT SERIOUS COMORBIDITY WITH BODY MASS INDEX (BMI) OF 45.0 TO 49.9 IN ADULT (HCC): ICD-10-CM

## 2021-05-25 RX ORDER — ROSUVASTATIN CALCIUM 5 MG/1
5 TABLET, COATED ORAL DAILY
Qty: 30 TABLET | Refills: 5 | Status: SHIPPED | OUTPATIENT
Start: 2021-05-25 | End: 2021-06-18 | Stop reason: SDUPTHER

## 2021-05-25 RX ORDER — ALBUTEROL SULFATE 90 UG/1
2 AEROSOL, METERED RESPIRATORY (INHALATION) EVERY 6 HOURS PRN
Qty: 1 G | Refills: 2 | Status: SHIPPED | OUTPATIENT
Start: 2021-05-25 | End: 2021-06-18 | Stop reason: SDUPTHER

## 2021-05-25 NOTE — LETTER
May 26, 2021     Patient: Guanako Taylor   YOB: 1970   Date of Visit: 5/25/2021       To Whom it May Concern:    Guanako Taylor is under my professional care  Patient with symptoms still with shortness of breath, undergoing testing  He  Will be seen in our office on 07/02/2021 for evaluation he will be off  work until then  If you have any questions or concerns, please don't hesitate to call   [phone]         Sincerely,            CC: WES Pritchett

## 2021-05-27 ENCOUNTER — TELEPHONE (OUTPATIENT)
Dept: PULMONOLOGY | Facility: CLINIC | Age: 51
End: 2021-05-27

## 2021-06-18 ENCOUNTER — TELEPHONE (OUTPATIENT)
Dept: BARIATRICS | Facility: CLINIC | Age: 51
End: 2021-06-18

## 2021-06-18 DIAGNOSIS — J45.30 MILD PERSISTENT ASTHMA WITHOUT COMPLICATION: ICD-10-CM

## 2021-06-18 DIAGNOSIS — J06.9 UPPER RESPIRATORY TRACT INFECTION, UNSPECIFIED TYPE: ICD-10-CM

## 2021-06-18 DIAGNOSIS — R73.03 PREDIABETES: ICD-10-CM

## 2021-06-18 DIAGNOSIS — E88.81 INSULIN RESISTANCE: ICD-10-CM

## 2021-06-18 DIAGNOSIS — E78.5 HYPERLIPIDEMIA, UNSPECIFIED HYPERLIPIDEMIA TYPE: ICD-10-CM

## 2021-06-18 DIAGNOSIS — E66.01 CLASS 3 SEVERE OBESITY DUE TO EXCESS CALORIES WITHOUT SERIOUS COMORBIDITY WITH BODY MASS INDEX (BMI) OF 45.0 TO 49.9 IN ADULT (HCC): ICD-10-CM

## 2021-06-18 DIAGNOSIS — E66.01 MORBID OBESITY (HCC): ICD-10-CM

## 2021-06-18 RX ORDER — ORAL SEMAGLUTIDE 14 MG/1
14 TABLET ORAL DAILY
Qty: 90 TABLET | Refills: 1 | Status: SHIPPED | OUTPATIENT
Start: 2021-06-18 | End: 2022-06-21 | Stop reason: SDUPTHER

## 2021-06-18 NOTE — TELEPHONE ENCOUNTER
The patient called the office and asked to have his Rybelsus he stated that you were to increase it from 7mg to a higher does  The patient would like it sent to [facility]   Their fax [phone]

## 2021-06-30 RX ORDER — ALBUTEROL SULFATE 90 UG/1
2 AEROSOL, METERED RESPIRATORY (INHALATION) EVERY 6 HOURS PRN
Qty: 1 G | Refills: 2 | Status: SHIPPED | OUTPATIENT
Start: 2021-06-30 | End: 2022-05-09 | Stop reason: SDUPTHER

## 2021-06-30 RX ORDER — ROSUVASTATIN CALCIUM 5 MG/1
5 TABLET, COATED ORAL DAILY
Qty: 30 TABLET | Refills: 5 | Status: SHIPPED | OUTPATIENT
Start: 2021-06-30 | End: 2022-02-18 | Stop reason: SDUPTHER

## 2021-07-02 ENCOUNTER — OFFICE VISIT (OUTPATIENT)
Dept: PULMONOLOGY | Facility: CLINIC | Age: 51
End: 2021-07-02
Payer: COMMERCIAL

## 2021-07-02 ENCOUNTER — TELEPHONE (OUTPATIENT)
Dept: PULMONOLOGY | Facility: CLINIC | Age: 51
End: 2021-07-02

## 2021-07-02 VITALS
OXYGEN SATURATION: 92 % | TEMPERATURE: 97 F | WEIGHT: 315 LBS | BODY MASS INDEX: 41.75 KG/M2 | DIASTOLIC BLOOD PRESSURE: 84 MMHG | SYSTOLIC BLOOD PRESSURE: 114 MMHG | HEART RATE: 103 BPM | HEIGHT: 73 IN

## 2021-07-02 DIAGNOSIS — E66.01 MORBID OBESITY (HCC): ICD-10-CM

## 2021-07-02 DIAGNOSIS — R06.02 SOB (SHORTNESS OF BREATH) ON EXERTION: ICD-10-CM

## 2021-07-02 DIAGNOSIS — J45.30 MILD PERSISTENT ASTHMA WITHOUT COMPLICATION: Primary | ICD-10-CM

## 2021-07-02 PROCEDURE — 1036F TOBACCO NON-USER: CPT | Performed by: INTERNAL MEDICINE

## 2021-07-02 PROCEDURE — 3008F BODY MASS INDEX DOCD: CPT | Performed by: INTERNAL MEDICINE

## 2021-07-02 PROCEDURE — 99214 OFFICE O/P EST MOD 30 MIN: CPT | Performed by: INTERNAL MEDICINE

## 2021-07-02 NOTE — PROGRESS NOTES
Assessment/Plan:   Diagnoses and all orders for this visit:    Mild persistent asthma without complication    SOB (shortness of breath) on exertion    Morbid obesity (HCC)      Shortness of breath and dyspnea on exertion in this patient with seasonal allergies as well as a hay fever, and recent COVID-19 infection   And reactive airway And shortness of breath dyspnea on exertion likely also contributed by his morbid obesity and likely untreated sleep apnea pulmonary hypertension  He does have an echocardiogram ordered by his PCP will follow-up  Will get complete PFT with post bronchodilator and a 6 minutes walk test, was ordered last visit but he still did not get it scheduled stating that his insurance does not pay for the testing here at [facility] he wants to get it done at [facility]  CT chest high-resolution and follow-up his recent chest x-ray in January of 2021 was normal , also he has a CT of the chest scheduled in not [place] will follow-up  Discussed with the patient  To continue with Breo 200/251 puff daily  Rinse mouth after use  Continue with albuterol MDI 2 puffs 4 times daily as needed, with the Breo he states he has  Not been needing the rescue inhaler  Clinical suspicion patient for obstructive sleep apnea he states he would like to think about the test  , will address again next visit  Return in about 4 weeks (around 7/30/2021)  All questions are answered to the patient's satisfaction and understanding  He verbalizes understanding  He is encouraged to call with any further questions or concerns  Portions of the record may have been created with voice recognition software  Occasional wrong word or "sound a like" substitutions may have occurred due to the inherent limitations of voice recognition software  Read the chart carefully and recognize, using context, where substitutions have occurred      Electronically Signed by Alicja Dominguez MD    ______________________________________________________________________    Chief Complaint:   Chief Complaint   Patient presents with    Follow-up       Patient ID: Alana Wallace is a 46 y o  y o  male has a past medical history of Cough, Cough, High cholesterol, Obesity, Class III, BMI 40-49 9 (morbid obesity) ([address] ), SOB (shortness of breath), SOB (shortness of breath), Wheezing, and Wheezing  7/2/2021  Patient presents today for follow-up visit  Alana Wallace is a very pleasant 40-year-old gentleman, former smoker with less than 10 pack-year smoking history who quit a year ago, who is a  and a  states he has been doing 2 jobs for several years, until he contracted COVID-23 infection in December of 2020 states he was not hospitalized he could manage it at home with supportive treatment as well as the steroids he states, he was also given an inhaler given he was having shortness of breath and wheezing he states he has been having asthma in the past with allergies especially hay fever and and when his wheezing was bad after the COVID-19 infection he was given albuterol inhaler which he has been using once or twice a day he states  And he states slowly his shortness of breath is getting better but he has it has been very slow he had a chest x-ray done in February which was normal          Review of Systems   Constitutional: Positive for fatigue  HENT: Negative  Eyes: Negative  Respiratory: Positive for shortness of breath  Cardiovascular: Negative  Gastrointestinal: Negative  Endocrine: Negative  Genitourinary: Negative  Musculoskeletal: Negative  Allergic/Immunologic: Negative  Neurological: Negative  Hematological: Negative  Psychiatric/Behavioral: Negative  Smoking history: He reports that he quit smoking about 18 months ago  His smoking use included cigarettes  He started smoking about 31 years ago  He has a 7 50 pack-year smoking history   He has never used smokeless tobacco     The following portions of the patient's history were reviewed and updated as appropriate: allergies, current medications, past family history, past medical history, past social history, past surgical history and problem list       There is no immunization history on file for this patient  Current Outpatient Medications   Medication Sig Dispense Refill    fluticasone (FLONASE) 50 mcg/act nasal spray SPRAY 1 SPRAY INTO EACH NOSTRIL EVERY DAY 16 mL 3    metFORMIN (GLUCOPHAGE) 500 mg tablet Take 1 tablet (500 mg total) by mouth 2 (two) times a day with meals 60 tablet 5    Semaglutide (Rybelsus) 14 MG TABS Take 14 mg by mouth daily 90 tablet 1    sildenafil (VIAGRA) 50 MG tablet Take 1 tablet (50 mg total) by mouth daily as needed for erectile dysfunction 10 tablet 0    albuterol (PROVENTIL HFA,VENTOLIN HFA) 90 mcg/act inhaler Inhale 2 puffs every 6 (six) hours as needed for wheezing or shortness of breath (Patient not taking: Reported on 7/2/2021) 1 g 2    busPIRone (BUSPAR) 5 mg tablet Take 1 tablet (5 mg total) by mouth 3 (three) times a day (Patient not taking: Reported on 3/24/2021) 30 tablet 0    fluticasone-salmeterol (Wixela Inhub) 500-50 mcg/dose inhaler Inhale 1 puff 2 (two) times a day Rinse mouth after use  (Patient not taking: Reported on 7/2/2021) 30 blister 3    predniSONE 20 mg tablet Take 1 tablet (20 mg total) by mouth daily (Patient not taking: Reported on 3/24/2021) 5 tablet 0    rosuvastatin (CRESTOR) 5 mg tablet Take 1 tablet (5 mg total) by mouth daily (Patient not taking: Reported on 7/2/2021) 30 tablet 5     No current facility-administered medications for this visit  Allergies: Patient has no known allergies  Objective:  Vitals:    07/02/21 0857   BP: 114/84   Pulse: 103   Temp: (!) 97 °F (36 1 °C)   SpO2: 92%   Weight: (!) 161 kg (356 lb)   Height: 6' 1 2" (1 859 m)   Oxygen Therapy  SpO2: 92 %      Wt Readings from Last 3 Encounters:   07/02/21 (!) 161 kg (356 lb)   05/20/21 (!) 164 kg (362 lb 9 6 oz)   05/11/21 (!) 166 kg (365 lb)     Body mass index is 46 71 kg/m²  Physical Exam  Constitutional:       Appearance: He is well-developed  HENT:      Head: Normocephalic and atraumatic  Eyes:      Pupils: Pupils are equal, round, and reactive to light  Neck:      Comments: Short and wide neck  Cardiovascular:      Rate and Rhythm: Normal rate and regular rhythm  Heart sounds: Normal heart sounds  Pulmonary:      Effort: Pulmonary effort is normal       Breath sounds: Normal breath sounds  Musculoskeletal:         General: Normal range of motion  Cervical back: Normal range of motion and neck supple  Skin:     General: Skin is warm and dry  Neurological:      Mental Status: He is alert and oriented to person, place, and time  Psychiatric:         Behavior: Behavior normal              Diagnostics:  I have personally reviewed pertinent reports

## 2021-07-02 NOTE — LETTER
July 6, 2021     Patient: Nivia Sarabia   YOB: 1970   Date of Visit: 7/2/2021       To Whom it May Concern:    Nivia Sarabia is under my professional care  He was seen in my office on 7/2/2021  He   Is still short of breath, getting testing done would re-evaluate after the testing in 4 weeks to evaluate for patient's going back to work  If you have any questions or concerns, please don't hesitate to call           Sincerely,          Bello Gaston MD        CC: Nivia Sarabia

## 2021-07-20 ENCOUNTER — TELEPHONE (OUTPATIENT)
Dept: PULMONOLOGY | Facility: CLINIC | Age: 51
End: 2021-07-20

## 2021-07-21 ENCOUNTER — TELEPHONE (OUTPATIENT)
Dept: PULMONOLOGY | Facility: CLINIC | Age: 51
End: 2021-07-21

## 2021-07-21 NOTE — TELEPHONE ENCOUNTER
Patient has to have PFT done closer to his work in [place]  He is not vaccinated in order to do the testing the lab needs the patient to have a PCR drawn   Please place order

## 2021-07-28 ENCOUNTER — OFFICE VISIT (OUTPATIENT)
Dept: FAMILY MEDICINE CLINIC | Facility: CLINIC | Age: 51
End: 2021-07-28
Payer: COMMERCIAL

## 2021-07-28 ENCOUNTER — TELEPHONE (OUTPATIENT)
Dept: PULMONOLOGY | Facility: CLINIC | Age: 51
End: 2021-07-28

## 2021-07-28 VITALS
RESPIRATION RATE: 15 BRPM | OXYGEN SATURATION: 94 % | HEIGHT: 73 IN | WEIGHT: 315 LBS | HEART RATE: 88 BPM | TEMPERATURE: 97.9 F | BODY MASS INDEX: 41.75 KG/M2

## 2021-07-28 DIAGNOSIS — L02.92 BOIL: Primary | ICD-10-CM

## 2021-07-28 PROCEDURE — 87205 SMEAR GRAM STAIN: CPT | Performed by: FAMILY MEDICINE

## 2021-07-28 PROCEDURE — 10060 I&D ABSCESS SIMPLE/SINGLE: CPT | Performed by: FAMILY MEDICINE

## 2021-07-28 PROCEDURE — 99214 OFFICE O/P EST MOD 30 MIN: CPT | Performed by: FAMILY MEDICINE

## 2021-07-28 PROCEDURE — 87070 CULTURE OTHR SPECIMN AEROBIC: CPT | Performed by: FAMILY MEDICINE

## 2021-07-28 PROCEDURE — 1036F TOBACCO NON-USER: CPT | Performed by: FAMILY MEDICINE

## 2021-07-28 RX ORDER — CEPHALEXIN 500 MG/1
500 CAPSULE ORAL EVERY 6 HOURS SCHEDULED
Qty: 28 CAPSULE | Refills: 0 | Status: SHIPPED | OUTPATIENT
Start: 2021-07-28 | End: 2021-08-04

## 2021-07-28 NOTE — PROGRESS NOTES
Assessment/Plan:     Chronic Problems:  No problem-specific Assessment & Plan notes found for this encounter  Incision and Drainage    Date/Time: 7/28/2021 1:57 PM  Performed by: WES Angeles  Authorized by: WES Angeles   Universal Protocol:  Consent: Verbal consent obtained  Consent given by: patient  Patient understanding: patient states understanding of the procedure being performed  Patient identity confirmed: verbally with patient      Patient location:  Clinic  Location:     Type:  Abscess    Location:  Lower extremity    Lower extremity location:  R leg  Pre-procedure details:     Skin preparation:  Betadine  Anesthesia (see MAR for exact dosages): Anesthesia method:  Local infiltration    Local anesthetic:  Lidocaine 2% w/o epi  Procedure details:     Complexity:  Simple    Incision types:  Stab incision    Scalpel blade:  10    Approach:  Puncture    Incision depth:  Superficial    Drainage:  Purulent    Drainage amount: Moderate    Wound treatment:  Wound left open    Packing materials:  None  Post-procedure details:     Patient tolerance of procedure: Tolerated well, no immediate complications  Comments:      C/s   Wound care instructions given , f/u  1wk     Visit Diagnosis:  Diagnoses and all orders for this visit:    Boil  -     cephalexin (KEFLEX) 500 mg capsule; Take 1 capsule (500 mg total) by mouth every 6 (six) hours for 7 days  -     mupirocin (BACTROBAN) 2 % ointment; Apply topically 3 (three) times a day  -     Wound culture and Gram stain    Other orders  -     Incision and Drainage          Subjective:    Patient ID: Keon Gonzalez is a 46 y o  male      Boil, right inner thigh  X 5 days    enlarging, tender, tense   home treatment, has attempted to APOLINAR multiple occasions with little benefit   denies fever chills, negative abdominal pain negative dysuria   non recurrence        The following portions of the patient's history were reviewed and updated as Ex-Partner:     Emotionally Abused:     Physically Abused:     Sexually Abused:      Past Medical History:   Diagnosis Date    Cough     Cough     High cholesterol     Obesity, Class III, BMI 40-49 9 (morbid obesity) (HCC)     SOB (shortness of breath)     SOB (shortness of breath)     Wheezing     Wheezing      Family History   Problem Relation Age of Onset    Drug abuse Mother     Lung cancer Father      Past Surgical History:   Procedure Laterality Date    APPENDECTOMY  26    SHOULDER SURGERY  5640-7029    several       Current Outpatient Medications:     fluticasone (FLONASE) 50 mcg/act nasal spray, SPRAY 1 SPRAY INTO EACH NOSTRIL EVERY DAY, Disp: 16 mL, Rfl: 3    metFORMIN (GLUCOPHAGE) 500 mg tablet, Take 1 tablet (500 mg total) by mouth 2 (two) times a day with meals, Disp: 60 tablet, Rfl: 5    Semaglutide (Rybelsus) 14 MG TABS, Take 14 mg by mouth daily, Disp: 90 tablet, Rfl: 1    sildenafil (VIAGRA) 50 MG tablet, Take 1 tablet (50 mg total) by mouth daily as needed for erectile dysfunction, Disp: 10 tablet, Rfl: 0    albuterol (PROVENTIL HFA,VENTOLIN HFA) 90 mcg/act inhaler, Inhale 2 puffs every 6 (six) hours as needed for wheezing or shortness of breath (Patient not taking: Reported on 7/2/2021), Disp: 1 g, Rfl: 2    busPIRone (BUSPAR) 5 mg tablet, Take 1 tablet (5 mg total) by mouth 3 (three) times a day (Patient not taking: Reported on 3/24/2021), Disp: 30 tablet, Rfl: 0    cephalexin (KEFLEX) 500 mg capsule, Take 1 capsule (500 mg total) by mouth every 6 (six) hours for 7 days, Disp: 28 capsule, Rfl: 0    fluticasone-salmeterol (Wixela Inhub) 500-50 mcg/dose inhaler, Inhale 1 puff 2 (two) times a day Rinse mouth after use   (Patient not taking: Reported on 7/2/2021), Disp: 30 blister, Rfl: 3    mupirocin (BACTROBAN) 2 % ointment, Apply topically 3 (three) times a day, Disp: 22 g, Rfl: 0    predniSONE 20 mg tablet, Take 1 tablet (20 mg total) by mouth daily (Patient not taking: Reported on 3/24/2021), Disp: 5 tablet, Rfl: 0    rosuvastatin (CRESTOR) 5 mg tablet, Take 1 tablet (5 mg total) by mouth daily (Patient not taking: Reported on 7/2/2021), Disp: 30 tablet, Rfl: 5    No Known Allergies       Lab Review   not applicable     Imaging: No results found  Objective:     Physical Exam  Constitutional:       General: He is not in acute distress  Appearance: He is obese  He is not ill-appearing  HENT:      Head: Atraumatic  Cardiovascular:      Rate and Rhythm: Normal rate  Pulses: Normal pulses  Musculoskeletal:      Cervical back: Normal range of motion  Skin:     Findings: Lesion present  Comments: Right inner thigh   approx 3cm tense fluculant erythemic boil            There are no Patient Instructions on file for this visit  WES Elliott    Portions of the record may have been created with voice recognition software  Occasional wrong word or "sound a like" substitutions may have occurred due to the inherent limitations of voice recognition software  Read the chart carefully and recognize, using context, where substitutions have occurred

## 2021-07-29 ENCOUNTER — RA CDI HCC (OUTPATIENT)
Dept: OTHER | Facility: HOSPITAL | Age: 51
End: 2021-07-29

## 2021-07-29 NOTE — PROGRESS NOTES
[facility]  coding opportunities          Chart reviewed, no opportunity found: CHART REVIEWED, NO OPPORTUNITY FOUND                     Patients insurance company: [email or web address] (Medicare and Commercial for [employer] and [employer])

## 2021-07-30 LAB
BACTERIA WND AEROBE CULT: ABNORMAL
GRAM STN SPEC: ABNORMAL
GRAM STN SPEC: ABNORMAL

## 2021-08-04 ENCOUNTER — OFFICE VISIT (OUTPATIENT)
Dept: FAMILY MEDICINE CLINIC | Facility: CLINIC | Age: 51
End: 2021-08-04
Payer: COMMERCIAL

## 2021-08-04 VITALS
WEIGHT: 315 LBS | OXYGEN SATURATION: 95 % | SYSTOLIC BLOOD PRESSURE: 110 MMHG | DIASTOLIC BLOOD PRESSURE: 78 MMHG | BODY MASS INDEX: 41.75 KG/M2 | HEART RATE: 91 BPM | HEIGHT: 73 IN

## 2021-08-04 DIAGNOSIS — L02.92 BOIL: Primary | ICD-10-CM

## 2021-08-04 PROCEDURE — 99024 POSTOP FOLLOW-UP VISIT: CPT | Performed by: FAMILY MEDICINE

## 2021-08-04 PROCEDURE — 3008F BODY MASS INDEX DOCD: CPT | Performed by: FAMILY MEDICINE

## 2021-08-04 NOTE — PROGRESS NOTES
Depression Screening and Follow-up Plan: Clincally patient does not have depression  No treatment is required  Assessment/Plan:     Chronic Problems:  No problem-specific Assessment & Plan notes found for this encounter  Visit Diagnosis:  There are no diagnoses linked to this encounter  Subjective:    Patient ID: Johnston Mcardle is a 46 y o  male  Wound check right inner thigh   considerably better since last seen negative pain swelling mild amount of discharge/drainage negative redness   treatments/ medications, Keflex, Bactroban, warm moist soaks to   negative fever chills skin changes vastly improved negative swelling     Has upcoming schedule pulmonary function test in regard to pulmonary eval, COVID   steady improvement   colonoscopy, upcoming      The following portions of the patient's history were reviewed and updated as appropriate: allergies, current medications, past family history, past medical history, past social history, past surgical history and problem list     Review of Systems   Constitutional: Negative for fever  Skin: Positive for wound           /78   Pulse 91   Ht 6' 1 2" (1 859 m)   Wt (!) 158 kg (348 lb)   SpO2 95%   BMI 45 66 kg/m²   Social History     Socioeconomic History    Marital status: /Civil Union     Spouse name: Not on file    Number of children: Not on file    Years of education: Not on file    Highest education level: Not on file   Occupational History    Occupation: employed   Tobacco Use    Smoking status: Former Smoker     Packs/day: 0 25     Years: 30 00     Pack years: 7 50     Types: Cigarettes     Start date:      Quit date:      Years since quittin 5    Smokeless tobacco: Never Used   Vaping Use    Vaping Use: Never used   Substance and Sexual Activity    Alcohol use: Yes     Comment: socially    Drug use: Never    Sexual activity: Yes   Other Topics Concern    Not on file   Social History Narrative    Not on file     Social Determinants of Health     Financial Resource Strain:     Difficulty of Paying Living Expenses:    Food Insecurity:     Worried About Running Out of Food in the Last Year:     [address] in the Last Year:    Transportation Needs:     Lack of Transportation (Medical):      Lack of Transportation (Non-Medical):    Physical Activity:     Days of Exercise per Week:     Minutes of Exercise per Session:    Stress:     Feeling of Stress :    Social Connections:     Frequency of Communication with Friends and Family:     Frequency of Social Gatherings with Friends and Family:     Attends Yazidism Services:     Active Member of Clubs or Organizations:     Attends Club or Organization Meetings:     Marital Status:    Intimate Partner Violence:     Fear of Current or Ex-Partner:     Emotionally Abused:     Physically Abused:     Sexually Abused:      Past Medical History:   Diagnosis Date    Cough     Cough     High cholesterol     Obesity, Class III, BMI 40-49 9 (morbid obesity) ([facility] [address]     SOB (shortness of breath)     SOB (shortness of breath)     Wheezing     Wheezing      Family History   Problem Relation Age of Onset    Drug abuse Mother     Lung cancer Father      Past Surgical History:   Procedure Laterality Date    APPENDECTOMY  26    SHOULDER SURGERY  [id number]    several       Current Outpatient Medications:     albuterol (PROVENTIL HFA,VENTOLIN HFA) 90 mcg/act inhaler, Inhale 2 puffs every 6 (six) hours as needed for wheezing or shortness of breath, Disp: 1 g, Rfl: 2    cephalexin (KEFLEX) 500 mg capsule, Take 1 capsule (500 mg total) by mouth every 6 (six) hours for 7 days, Disp: 28 capsule, Rfl: 0    fluticasone (FLONASE) 50 mcg/act nasal spray, SPRAY 1 SPRAY INTO EACH NOSTRIL EVERY DAY, Disp: 16 mL, Rfl: 3    fluticasone-salmeterol (Wixela Inhub) 500-50 mcg/dose inhaler, Inhale 1 puff 2 (two) times a day Rinse mouth after use , Disp: 30 blister, Rfl: 3   metFORMIN (GLUCOPHAGE) 500 mg tablet, Take 1 tablet (500 mg total) by mouth 2 (two) times a day with meals, Disp: 60 tablet, Rfl: 5    mupirocin (BACTROBAN) 2 % ointment, Apply topically 3 (three) times a day, Disp: 22 g, Rfl: 0    rosuvastatin (CRESTOR) 5 mg tablet, Take 1 tablet (5 mg total) by mouth daily, Disp: 30 tablet, Rfl: 5    Semaglutide (Rybelsus) 14 MG TABS, Take 14 mg by mouth daily, Disp: 90 tablet, Rfl: 1    sildenafil (VIAGRA) 50 MG tablet, Take 1 tablet (50 mg total) by mouth daily as needed for erectile dysfunction, Disp: 10 tablet, Rfl: 0    busPIRone (BUSPAR) 5 mg tablet, Take 1 tablet (5 mg total) by mouth 3 (three) times a day (Patient not taking: Reported on 3/24/2021), Disp: 30 tablet, Rfl: 0    predniSONE 20 mg tablet, Take 1 tablet (20 mg total) by mouth daily (Patient not taking: Reported on 8/4/2021), Disp: 5 tablet, Rfl: 0    No Known Allergies       Lab Review   not applicable     Imaging: No results found  Objective:     Physical Exam  Constitutional:       General: He is not in acute distress  Appearance: He is not ill-appearing or toxic-appearing  Musculoskeletal:         General: Normal range of motion  Skin:     Comments:  Wound right thigh, knee negative swelling negative  Erythema, tenderness minimal discharge clear            There are no Patient Instructions on file for this visit  WES Pickens    Portions of the record may have been created with voice recognition software  Occasional wrong word or "sound a like" substitutions may have occurred due to the inherent limitations of voice recognition software  Read the chart carefully and recognize, using context, where substitutions have occurred

## 2021-08-17 NOTE — TELEPHONE ENCOUNTER
Letter in chart
Patient called again, he did not receive orders for COVID test and PFT  He stated he has been trying for awhile to get these orders  He is also asking for a work note  Patient stated he is very frustrated and would a call back ASAP  I advised him I am going to resend orders to his email  Please advise    Emailed PFT & COVID test to [email or web address]  Called pt to confirm that he received forms 
Patient is scheduled for pft in [facility] on 8/5 and he does not return to us until 9/16  Patient is asking for a extension for work until she sees you again  Please advise and extend if necessary  Thank you 
Patient was calling again today, he wanted to make sure the documents have been faxed and received to us from his testing  He also was asking again for an updated work note  As seen below   Please advise [phone]
Spoke with Alana Wallace, he would like a work note to be extended until his next appt   [place] text sent to Shetty Murcierra Jarrett
Spoke with patient and emailed him letter
Price (Do Not Change): 0.00
Instructions: This plan will send the code FBSD to the PM system.  DO NOT or CHANGE the price.
Detail Level: Simple

## 2021-08-24 ENCOUNTER — TELEPHONE (OUTPATIENT)
Dept: PULMONOLOGY | Facility: CLINIC | Age: 51
End: 2021-08-24

## 2021-08-24 NOTE — TELEPHONE ENCOUNTER
Patient calling stating he was given a work note to stay out until 8/5 when he got testing done  He stated he never got the results of that testing and if he should stay out of work  He is asking for a new note be written for him to stay out until his next appt on 9/16  I advised it looked like they were waiting on the test results to decide him being out  He stated this was his third call and demanded a call back today   Please advise [phone]

## 2021-08-24 NOTE — TELEPHONE ENCOUNTER
I spoke with the patient discussed his PFT results and also sent in Spiriva to [facility] he will continue using his inhalers and he will see us back on 09/16 also updated the letter to stay off until 9/16   He wanted it to be sent as an e-mail

## 2021-09-16 ENCOUNTER — OFFICE VISIT (OUTPATIENT)
Dept: PULMONOLOGY | Facility: CLINIC | Age: 51
End: 2021-09-16
Payer: COMMERCIAL

## 2021-09-16 VITALS
HEART RATE: 102 BPM | HEIGHT: 73 IN | OXYGEN SATURATION: 91 % | DIASTOLIC BLOOD PRESSURE: 84 MMHG | WEIGHT: 315 LBS | SYSTOLIC BLOOD PRESSURE: 120 MMHG | BODY MASS INDEX: 41.75 KG/M2 | TEMPERATURE: 96.7 F

## 2021-09-16 DIAGNOSIS — J45.40 MODERATE PERSISTENT ASTHMA WITHOUT COMPLICATION: ICD-10-CM

## 2021-09-16 DIAGNOSIS — E66.01 MORBID OBESITY (HCC): ICD-10-CM

## 2021-09-16 DIAGNOSIS — R06.02 SOB (SHORTNESS OF BREATH): Primary | ICD-10-CM

## 2021-09-16 PROCEDURE — 99214 OFFICE O/P EST MOD 30 MIN: CPT | Performed by: INTERNAL MEDICINE

## 2021-09-16 PROCEDURE — 3008F BODY MASS INDEX DOCD: CPT | Performed by: INTERNAL MEDICINE

## 2021-09-16 PROCEDURE — 1036F TOBACCO NON-USER: CPT | Performed by: INTERNAL MEDICINE

## 2021-09-16 RX ORDER — ALBUTEROL SULFATE 2.5 MG/3ML
2.5 SOLUTION RESPIRATORY (INHALATION) EVERY 6 HOURS PRN
Qty: 120 ML | Refills: 0 | Status: SHIPPED | OUTPATIENT
Start: 2021-09-16

## 2021-09-16 NOTE — PROGRESS NOTES
Assessment/Plan:   Diagnoses and all orders for this visit:    SOB (shortness of breath)    Moderate persistent asthma without complication  -     albuterol (2 5 mg/3 mL) 0 083 % nebulizer solution; Take 3 mL (2 5 mg total) by nebulization every 6 (six) hours as needed for wheezing or shortness of breath    Morbid obesity (HCC)       Shortness of breath and dyspnea on exertion with reactive airway disease moderate persistent asthma without complication not in any exacerbation currently  I have reviewed the PFTs with his FEV1 of 51% with response to the bronchodilators in the small airways  6 minute walk test did not need the supplemental oxygen  I have given him samples of the Breo 200/25 to use 1 puff daily  Rinse mouth after use  Spiriva 2 5 mg Respimat 2 puffs daily   Respimat use discussed with the patient  Albuterol via nebulizer 4 times daily as needed   He will give us a call in 2 weeks to let us know how he is doing with the current regimen I will see him back in 4 weeks or p r n  earlier as needed  Recommend weight loss    Return in about 4 weeks (around 10/14/2021)  All questions are answered to the patient's satisfaction and understanding  He verbalizes understanding  He is encouraged to call with any further questions or concerns  Portions of the record may have been created with voice recognition software  Occasional wrong word or "sound a like" substitutions may have occurred due to the inherent limitations of voice recognition software  Read the chart carefully and recognize, using context, where substitutions have occurred      Electronically Signed by Bhavin Vo MD    ______________________________________________________________________    Chief Complaint:   Chief Complaint   Patient presents with    Follow-up       Patient ID: Ryan Kyle is a 46 y o  y o  male has a past medical history of Cough, Cough, High cholesterol, Obesity, Class III, BMI 40-49 9 (morbid obesity) ([facility] [address] ), SOB (shortness of breath), SOB (shortness of breath), SOB (shortness of breath), Wheezing, and Wheezing  9/16/2021  Patient presents today for follow-up visit  Julián Henao is a very pleasant 59-year-old gentleman, former smoker with less than 10 pack-year smoking history who quit a year ago, who is a  and a  states he has been doing 2 jobs for several years, until he contracted COVID-23 infection in December of 2020 states he was not hospitalized he could manage it at home with supportive treatment as well as the steroids he states, he was also given an inhaler given he was having shortness of breath and wheezing he states he has been having asthma in the past with allergies especially hay fever and and when his wheezing was bad after the COVID-19 infection he was given albuterol inhaler which he has been using once or twice a day he states  when he had the sample  Off the Trelegy that was given to him during his last visit he did feel better we had sent in the [address] but his  Has a very high co-pay with his insurance and is not able to get it he states  And when he is not using the medication in the past 3 weeks he has difficulty again climbing up a flight of stairs  He did have a PFT done at an outside facility  He did not get the CT scan done because of financial issues he states  Not able to pay for the CT scan      Review of Systems   Constitutional: Positive for fatigue  HENT: Negative  Eyes: Negative  Respiratory: Positive for shortness of breath ( on exertion, he states he was doing better on the medications once he ran out of the medication he again has shortness of breath on exertion  )  Cardiovascular: Negative  Gastrointestinal: Negative  Endocrine: Negative  Genitourinary: Negative  Musculoskeletal: Negative  Allergic/Immunologic: Negative  Neurological: Negative  Hematological: Negative  Psychiatric/Behavioral: Negative  Smoking history: He reports that he quit smoking about 20 months ago  His smoking use included cigarettes  He started smoking about 31 years ago  He has a 7 50 pack-year smoking history  He has never used smokeless tobacco     The following portions of the patient's history were reviewed and updated as appropriate: allergies, current medications, past family history, past medical history, past social history, past surgical history and problem list       There is no immunization history on file for this patient  Current Outpatient Medications   Medication Sig Dispense Refill    albuterol (PROVENTIL HFA,VENTOLIN HFA) 90 mcg/act inhaler Inhale 2 puffs every 6 (six) hours as needed for wheezing or shortness of breath 1 g 2    fluticasone (FLONASE) 50 mcg/act nasal spray SPRAY 1 SPRAY INTO EACH NOSTRIL EVERY DAY 16 mL 3    fluticasone-salmeterol (Wixela Inhub) 500-50 mcg/dose inhaler Inhale 1 puff 2 (two) times a day Rinse mouth after use   30 blister 3    metFORMIN (GLUCOPHAGE) 500 mg tablet Take 1 tablet (500 mg total) by mouth 2 (two) times a day with meals 60 tablet 5    mupirocin (BACTROBAN) 2 % ointment Apply topically 3 (three) times a day 22 g 0    rosuvastatin (CRESTOR) 5 mg tablet Take 1 tablet (5 mg total) by mouth daily 30 tablet 5    Semaglutide (Rybelsus) 14 MG TABS Take 14 mg by mouth daily 90 tablet 1    sildenafil (VIAGRA) 50 MG tablet Take 1 tablet (50 mg total) by mouth daily as needed for erectile dysfunction 10 tablet 0    tiotropium (Spiriva HandiHaler) 18 mcg inhalation capsule Place 1 capsule (18 mcg total) into inhaler and inhale daily 90 capsule 1    albuterol (2 5 mg/3 mL) 0 083 % nebulizer solution Take 3 mL (2 5 mg total) by nebulization every 6 (six) hours as needed for wheezing or shortness of breath 120 mL 0    busPIRone (BUSPAR) 5 mg tablet Take 1 tablet (5 mg total) by mouth 3 (three) times a day (Patient not taking: Reported on 3/24/2021) 30 tablet 0    predniSONE 20 mg tablet Take 1 tablet (20 mg total) by mouth daily (Patient not taking: Reported on 8/4/2021) 5 tablet 0     No current facility-administered medications for this visit  Allergies: Patient has no known allergies  Objective:  Vitals:    09/16/21 0850   BP: 120/84   Pulse: 102   Temp: (!) 96 7 °F (35 9 °C)   SpO2: 91%   Weight: (!) 166 kg (367 lb)   Height: 6' 1 2" (1 859 m)   Oxygen Therapy  SpO2: 91 %    Wt Readings from Last 3 Encounters:   09/16/21 (!) 166 kg (367 lb)   08/04/21 (!) 158 kg (348 lb)   07/28/21 (!) 158 kg (348 lb)     Body mass index is 48 16 kg/m²  Physical Exam  Vitals and nursing note reviewed  Constitutional:       Appearance: He is well-developed  HENT:      Head: Normocephalic and atraumatic  Eyes:      Conjunctiva/sclera: Conjunctivae normal       Pupils: Pupils are equal, round, and reactive to light  Neck:      Thyroid: No thyromegaly  Vascular: No JVD  Cardiovascular:      Rate and Rhythm: Normal rate and regular rhythm  Heart sounds: Normal heart sounds  No murmur heard  No friction rub  No gallop  Pulmonary:      Effort: Pulmonary effort is normal  No respiratory distress  Breath sounds: Normal breath sounds  No wheezing or rales  Chest:      Chest wall: No tenderness  Musculoskeletal:         General: No tenderness or deformity  Normal range of motion  Cervical back: Normal range of motion and neck supple  Lymphadenopathy:      Cervical: No cervical adenopathy  Skin:     General: Skin is warm and dry  Neurological:      Mental Status: He is alert and oriented to person, place, and time  Diagnostics:  I have personally reviewed pertinent reports

## 2021-09-16 NOTE — LETTER
September 16, 2021     Patient: Jeyson Avelar   YOB: 1970   Date of Visit: 9/16/2021       To Whom it May Concern:    Jeyson Avelar is under my professional care  He was seen in my office on 9/16/2021  Still has shortness of breath and dyspnea on exertion currently stepped up his medication   He may return to work on in 4 weeks  If you have any questions or concerns, please don't hesitate to call           Sincerely,          Lillie Hutchison MD        CC: No Recipients

## 2021-10-07 ENCOUNTER — TELEPHONE (OUTPATIENT)
Dept: PULMONOLOGY | Facility: CLINIC | Age: 51
End: 2021-10-07

## 2021-10-08 ENCOUNTER — TELEPHONE (OUTPATIENT)
Dept: PULMONOLOGY | Facility: CLINIC | Age: 51
End: 2021-10-08

## 2021-10-14 ENCOUNTER — TELEPHONE (OUTPATIENT)
Dept: PULMONOLOGY | Facility: CLINIC | Age: 51
End: 2021-10-14

## 2021-10-15 ENCOUNTER — TELEPHONE (OUTPATIENT)
Dept: PULMONOLOGY | Facility: CLINIC | Age: 51
End: 2021-10-15

## 2021-11-27 ENCOUNTER — NURSE TRIAGE (OUTPATIENT)
Dept: OTHER | Facility: OTHER | Age: 51
End: 2021-11-27

## 2021-11-29 ENCOUNTER — TELEPHONE (OUTPATIENT)
Dept: FAMILY MEDICINE CLINIC | Facility: CLINIC | Age: 51
End: 2021-11-29

## 2021-12-16 ENCOUNTER — OFFICE VISIT (OUTPATIENT)
Dept: PULMONOLOGY | Facility: CLINIC | Age: 51
End: 2021-12-16
Payer: COMMERCIAL

## 2021-12-16 VITALS
TEMPERATURE: 97.9 F | HEART RATE: 95 BPM | HEIGHT: 73 IN | BODY MASS INDEX: 41.75 KG/M2 | SYSTOLIC BLOOD PRESSURE: 116 MMHG | OXYGEN SATURATION: 92 % | WEIGHT: 315 LBS | DIASTOLIC BLOOD PRESSURE: 80 MMHG

## 2021-12-16 DIAGNOSIS — J45.40 MODERATE PERSISTENT ASTHMA WITHOUT COMPLICATION: ICD-10-CM

## 2021-12-16 DIAGNOSIS — E66.01 MORBID OBESITY (HCC): ICD-10-CM

## 2021-12-16 DIAGNOSIS — U07.1 COVID-19 VIRUS INFECTION: ICD-10-CM

## 2021-12-16 DIAGNOSIS — R06.02 SOB (SHORTNESS OF BREATH): Primary | ICD-10-CM

## 2021-12-16 PROCEDURE — 99214 OFFICE O/P EST MOD 30 MIN: CPT | Performed by: INTERNAL MEDICINE

## 2021-12-16 PROCEDURE — 1036F TOBACCO NON-USER: CPT | Performed by: INTERNAL MEDICINE

## 2021-12-16 PROCEDURE — 3008F BODY MASS INDEX DOCD: CPT | Performed by: INTERNAL MEDICINE

## 2021-12-28 ENCOUNTER — VBI (OUTPATIENT)
Dept: ADMINISTRATIVE | Facility: OTHER | Age: 51
End: 2021-12-28

## 2022-02-17 ENCOUNTER — TELEPHONE (OUTPATIENT)
Dept: BARIATRICS | Facility: CLINIC | Age: 52
End: 2022-02-17

## 2022-02-17 NOTE — TELEPHONE ENCOUNTER
Patient is requesting a refill on Rybelsus     Patient had canceled 2 appointments with Dr Anamaria Smith, he was told needs a f/u appt , Mr Libby Coronado will call back to schedule it,ty

## 2022-02-18 DIAGNOSIS — E88.81 INSULIN RESISTANCE: ICD-10-CM

## 2022-02-18 DIAGNOSIS — E78.5 HYPERLIPIDEMIA, UNSPECIFIED HYPERLIPIDEMIA TYPE: ICD-10-CM

## 2022-02-18 DIAGNOSIS — E66.01 CLASS 3 SEVERE OBESITY DUE TO EXCESS CALORIES WITHOUT SERIOUS COMORBIDITY WITH BODY MASS INDEX (BMI) OF 45.0 TO 49.9 IN ADULT (HCC): ICD-10-CM

## 2022-02-19 RX ORDER — ROSUVASTATIN CALCIUM 5 MG/1
5 TABLET, COATED ORAL DAILY
Qty: 90 TABLET | Refills: 0 | Status: SHIPPED | OUTPATIENT
Start: 2022-02-19 | End: 2022-07-13 | Stop reason: SDUPTHER

## 2022-03-09 ENCOUNTER — TELEPHONE (OUTPATIENT)
Dept: PULMONOLOGY | Facility: CLINIC | Age: 52
End: 2022-03-09

## 2022-03-29 ENCOUNTER — OFFICE VISIT (OUTPATIENT)
Dept: PULMONOLOGY | Facility: CLINIC | Age: 52
End: 2022-03-29
Payer: COMMERCIAL

## 2022-03-29 VITALS
DIASTOLIC BLOOD PRESSURE: 84 MMHG | HEIGHT: 73 IN | HEART RATE: 115 BPM | WEIGHT: 315 LBS | BODY MASS INDEX: 41.75 KG/M2 | TEMPERATURE: 97.4 F | SYSTOLIC BLOOD PRESSURE: 126 MMHG | OXYGEN SATURATION: 92 %

## 2022-03-29 DIAGNOSIS — U07.1 COVID-19 VIRUS INFECTION: ICD-10-CM

## 2022-03-29 DIAGNOSIS — R06.02 SOB (SHORTNESS OF BREATH): Primary | ICD-10-CM

## 2022-03-29 DIAGNOSIS — J45.40 MODERATE PERSISTENT ASTHMA WITHOUT COMPLICATION: ICD-10-CM

## 2022-03-29 DIAGNOSIS — E66.01 MORBID OBESITY (HCC): ICD-10-CM

## 2022-03-29 PROCEDURE — 99214 OFFICE O/P EST MOD 30 MIN: CPT | Performed by: INTERNAL MEDICINE

## 2022-03-29 NOTE — PROGRESS NOTES
Assessment/Plan:   Diagnoses and all orders for this visit:    SOB (shortness of breath)    Moderate persistent asthma without complication    [id number] virus infection    Morbid obesity (HCC)      shortness of breath and dyspnea on exertion with reactive airway disease moderate persistent asthma without complication not in any exacerbation currently   I have reviewed the PFTs with his FEV1 of 51% with response to the bronchodilators in the small airways 6 minute walk test did not need the supplemental oxygen  He was on Wixela as well as the Spiriva Respimat given he ran out of the medications recently and cannot afford   , given him Trelegy 1 puff daily Rinse mouth after use   Continue with albuterol via nebulizer 4 times daily as needed   Also discussed regarding pulmonary rehab for him and long discussion with the patient with regards to weight loss understands and verbalizes   He states he is working on increasing his exercise tolerance, he does feel better since his last visit he states he is exercising regularly and also planning to go back to work he states, he used to work 2 jobs he states, he lost 1 job because after his [place] infection he states he is planning to go back as a    I will see him back in 3 months or p r n  earlier as needed  No follow-ups on file  All questions are answered to the patient's satisfaction and understanding  He verbalizes understanding  He is encouraged to call with any further questions or concerns  Portions of the record may have been created with voice recognition software  Occasional wrong word or "sound a like" substitutions may have occurred due to the inherent limitations of voice recognition software  Read the chart carefully and recognize, using context, where substitutions have occurred      Electronically Signed by Vishnu Vee MD    ______________________________________________________________________    Chief Complaint:   Chief Complaint Patient presents with    Cough    Shortness of Breath    Wheezing       Patient ID: Byron Jennings is a 46 y o  y o  male has a past medical history of Cough, Cough, Cough, High cholesterol, Obesity, Class III, BMI 40-49 9 (morbid obesity) ([facility] [address] ), SOB (shortness of breath), SOB (shortness of breath), SOB (shortness of breath), SOB (shortness of breath), SOB (shortness of breath), Wheezing, Wheezing, and Wheezing  3/29/2022  Patient presents today for follow-up visit  Byron Jennings is a very pleasant 80-year-old gentleman, former smoker with less than 10 pack-year smoking history who quit a year ago, who is a  and a  states he has been doing 2 jobs for several years, until he contracted COVID-23 infection in December of 2020 states he was not hospitalized he could manage it at home with supportive treatment as well as the steroids he states, he was also given an inhaler given he was having shortness of breath and wheezing he states he has been having asthma in the past with allergies especially hay fever and and when his wheezing was bad after the COVID-19 infection he was given albuterol inhaler which he has been using once or twice a day he states  He has been currently on the Wixela 1 puff twice a day and the Spiriva Respimat as well as using his albuterol nebulizer once or twice a day although he is slightly better than before but still he has shortness of breath and dyspnea on exertion even climbing up a flight of stairs he states  All cardiac workup was done which was negative also has been battling with his insurance issues he states and also he lost his job recently  Has been in 2 years today stating that he lost 1 of his colleagues who committed suicide because of financial issues after the COVID infection, I had offered support and expressed a condolences  He stated he is strong and has support from his friends and family he states    This visit he states his breathing is slightly better he did run out of the [employer] a few weeks ago and  currently cannot afford the medication he states  He does feel better with the use of the long-acting inhaler with increased exercise tolerance he states  Review of Systems   Constitutional: Negative  HENT: Negative  Eyes: Negative  Respiratory: Positive for shortness of breath and wheezing (Occasional wheezing)  Cardiovascular: Negative  Gastrointestinal: Negative  Endocrine: Negative  Genitourinary: Negative  Musculoskeletal: Negative  Allergic/Immunologic: Negative  Neurological: Negative  Hematological: Negative  Psychiatric/Behavioral: Negative  Smoking history: He reports that he quit smoking about 2 years ago  His smoking use included cigarettes  He started smoking about 32 years ago  He has a 7 50 pack-year smoking history  He has never used smokeless tobacco     The following portions of the patient's history were reviewed and updated as appropriate: allergies, current medications, past family history, past medical history, past social history, past surgical history and problem list       There is no immunization history on file for this patient  Current Outpatient Medications   Medication Sig Dispense Refill    albuterol (2 5 mg/3 mL) 0 083 % nebulizer solution Take 3 mL (2 5 mg total) by nebulization every 6 (six) hours as needed for wheezing or shortness of breath 120 mL 0    albuterol (PROVENTIL HFA,VENTOLIN HFA) 90 mcg/act inhaler Inhale 2 puffs every 6 (six) hours as needed for wheezing or shortness of breath 1 g 2    fluticasone (FLONASE) 50 mcg/act nasal spray SPRAY 1 SPRAY INTO EACH NOSTRIL EVERY DAY 16 mL 3    fluticasone-salmeterol (Wixela Inhub) 500-50 mcg/dose inhaler Inhale 1 puff 2 (two) times a day Rinse mouth after use  30 blister 3    ibuprofen (MOTRIN) 800 mg tablet Take 1 tablet (800 mg total) by mouth every 8 (eight) hours as needed for mild pain With 2 tylenol   30 tablet 0    metFORMIN (GLUCOPHAGE) 500 mg tablet Take 1 tablet (500 mg total) by mouth 2 (two) times a day with meals 60 tablet 5    mupirocin (BACTROBAN) 2 % ointment Apply topically 3 (three) times a day 22 g 0    rosuvastatin (CRESTOR) 5 mg tablet Take 1 tablet (5 mg total) by mouth daily 90 tablet 0    Semaglutide (Rybelsus) 14 MG TABS Take 14 mg by mouth daily 90 tablet 1    sildenafil (VIAGRA) 50 MG tablet Take 1 tablet (50 mg total) by mouth daily as needed for erectile dysfunction 10 tablet 0    busPIRone (BUSPAR) 5 mg tablet Take 1 tablet (5 mg total) by mouth 3 (three) times a day (Patient not taking: Reported on 3/24/2021) 30 tablet 0    predniSONE 20 mg tablet Take 1 tablet (20 mg total) by mouth daily (Patient not taking: Reported on 8/4/2021) 5 tablet 0    tiotropium (Spiriva HandiHaler) 18 mcg inhalation capsule Place 1 capsule (18 mcg total) into inhaler and inhale daily 90 capsule 1     No current facility-administered medications for this visit  Allergies: Patient has no known allergies  Objective:  Vitals:    03/29/22 0831   BP: 126/84   Pulse: (!) 115   Temp: (!) 97 4 °F (36 3 °C)   SpO2: 92%   Weight: (!) 173 kg (381 lb)   Height: 6' 1 2" (1 859 m)   Oxygen Therapy  SpO2: 92 %    Wt Readings from Last 3 Encounters:   03/29/22 (!) 173 kg (381 lb)   12/16/21 (!) 162 kg (358 lb)   09/16/21 (!) 166 kg (367 lb)     Body mass index is 49 99 kg/m²  Physical Exam  Constitutional:       Appearance: He is well-developed  HENT:      Head: Normocephalic and atraumatic  Eyes:      Pupils: Pupils are equal, round, and reactive to light  Neck:      Comments: Short and wide neck  Cardiovascular:      Rate and Rhythm: Normal rate and regular rhythm  Heart sounds: Normal heart sounds  Pulmonary:      Effort: Pulmonary effort is normal       Breath sounds: Normal breath sounds  Musculoskeletal:         General: Normal range of motion  Cervical back: Normal range of motion and neck supple  Skin:     General: Skin is warm and dry  Neurological:      Mental Status: He is alert and oriented to person, place, and time  Psychiatric:         Behavior: Behavior normal            Diagnostics:  I have personally reviewed pertinent reports

## 2022-05-03 ENCOUNTER — TELEPHONE (OUTPATIENT)
Dept: PULMONOLOGY | Facility: CLINIC | Age: 52
End: 2022-05-03

## 2022-05-03 NOTE — TELEPHONE ENCOUNTER
Please ask if he needs a letter to go back to work, I asked him to call and let us know if his breathing is better and wants to go back to work , I will put in the letter Skin normal color for race, warm, dry and intact. No evidence of rash.

## 2022-05-03 NOTE — TELEPHONE ENCOUNTER
Patient is calling, he said Marjorie Acharyas wanted him to call into the office for her so they can talk about how he is doing  He said there is no emergency but he was just calling to check up with the Doctor as she asked   Please advise

## 2022-05-03 NOTE — TELEPHONE ENCOUNTER
Called patient and he said no he is not doing better, his breathing is the same  He has not been able to afford his medicine in awhile and the go fund me account is very supportive for him  He is asking if we could send the inhalers in to [facility] we have on file he will be able to get pick that up (albuterol rescue inhaler he wants 2 of them, trelegy, spiriva) He asked if you could update a note, stating that he is out of work until his next appointment with you on June 22nd, he said he can come pick it up in the office  If anything changes before the appointment he will call you, but there was no need for you to call him unless you needed too  He wanted to thank you for being such an amazing Doctor and how helpful you have been to him   Please and thank you

## 2022-05-09 DIAGNOSIS — J45.909 UNCOMPLICATED ASTHMA, UNSPECIFIED ASTHMA SEVERITY, UNSPECIFIED WHETHER PERSISTENT: ICD-10-CM

## 2022-05-09 DIAGNOSIS — J06.9 UPPER RESPIRATORY TRACT INFECTION, UNSPECIFIED TYPE: ICD-10-CM

## 2022-05-09 RX ORDER — ALBUTEROL SULFATE 90 UG/1
2 AEROSOL, METERED RESPIRATORY (INHALATION) EVERY 6 HOURS PRN
Qty: 1 G | Refills: 2 | Status: SHIPPED | OUTPATIENT
Start: 2022-05-09

## 2022-05-09 RX ORDER — FLUTICASONE FUROATE, UMECLIDINIUM BROMIDE AND VILANTEROL TRIFENATATE 200; 62.5; 25 UG/1; UG/1; UG/1
1 POWDER RESPIRATORY (INHALATION) DAILY
Qty: 60 BLISTER | Refills: 0 | Status: SHIPPED | OUTPATIENT
Start: 2022-05-09 | End: 2022-07-04

## 2022-05-09 NOTE — TELEPHONE ENCOUNTER
Can you check u check for any trelegy samples if we have , if yes please call him to come   Placed letter updated in the chart

## 2022-05-12 ENCOUNTER — TELEPHONE (OUTPATIENT)
Dept: FAMILY MEDICINE CLINIC | Facility: CLINIC | Age: 52
End: 2022-05-12

## 2022-05-12 ENCOUNTER — TELEPHONE (OUTPATIENT)
Dept: PULMONOLOGY | Facility: CLINIC | Age: 52
End: 2022-05-12

## 2022-05-12 NOTE — TELEPHONE ENCOUNTER
Spoke to the patient, the letter for the work excuse is already in the chart on 05/09  He stated he wants it printed out will leave it at the  when and and will appear pick it up   Hyperbaric therapy for COVID long hollers not a standardized care of treatment he states he has if you doctors were doing it he states so I asked him to go to talk to them

## 2022-05-12 NOTE — TELEPHONE ENCOUNTER
Patient calling saying he would like to speak with Carmen Jimenez  I asked what it was regarding and he said it was a few things  1  He states he needs a note form Dr Drew Huff that states he is still under her care for his workers comp case  2  He wanted to let Dr Drew Huff know that for the past 4-6 weeks he has noticed his smell and taste are starting to get better  3  He saw online about Hyperbaric therapy for covid long haulers and would like to know if Dr Drew Huff knows anything about this and what her opinion is  He said he is willing to try anything at this point to get himself back in good health  Please advise

## 2022-06-20 ENCOUNTER — TELEPHONE (OUTPATIENT)
Dept: FAMILY MEDICINE CLINIC | Facility: CLINIC | Age: 52
End: 2022-06-20

## 2022-06-20 DIAGNOSIS — Z12.11 SCREENING FOR COLON CANCER: Primary | ICD-10-CM

## 2022-06-21 ENCOUNTER — OFFICE VISIT (OUTPATIENT)
Dept: BARIATRICS | Facility: CLINIC | Age: 52
End: 2022-06-21
Payer: COMMERCIAL

## 2022-06-21 VITALS
RESPIRATION RATE: 14 BRPM | OXYGEN SATURATION: 94 % | HEIGHT: 73 IN | WEIGHT: 315 LBS | SYSTOLIC BLOOD PRESSURE: 128 MMHG | BODY MASS INDEX: 41.75 KG/M2 | HEART RATE: 74 BPM | DIASTOLIC BLOOD PRESSURE: 86 MMHG | TEMPERATURE: 98 F

## 2022-06-21 DIAGNOSIS — E88.81 INSULIN RESISTANCE: ICD-10-CM

## 2022-06-21 DIAGNOSIS — R63.5 ABNORMAL WEIGHT GAIN: ICD-10-CM

## 2022-06-21 DIAGNOSIS — E78.2 MIXED HYPERLIPIDEMIA: ICD-10-CM

## 2022-06-21 DIAGNOSIS — E66.01 MORBID OBESITY (HCC): ICD-10-CM

## 2022-06-21 DIAGNOSIS — R73.03 PREDIABETES: ICD-10-CM

## 2022-06-21 DIAGNOSIS — E66.01 OBESITY, CLASS III, BMI 40-49.9 (MORBID OBESITY) (HCC): Primary | ICD-10-CM

## 2022-06-21 PROCEDURE — 99214 OFFICE O/P EST MOD 30 MIN: CPT | Performed by: INTERNAL MEDICINE

## 2022-06-21 NOTE — PROGRESS NOTES
Assessment/Plan:  Ana Durbin was seen today for follow-up  Diagnoses and all orders for this visit:    1  Obesity, Class III, BMI 40-49 9 (morbid obesity) ([facility] [address] )     2  Mixed hyperlipidemia     3  Abnormal weight gain     4  Prediabetes  Semaglutide 3 MG TABS    Semaglutide (Rybelsus) 14 MG TABS    Semaglutide 7 MG TABS    DISCONTINUED: Semaglutide 7 MG TABS   5  Insulin resistance  Semaglutide 3 MG TABS    Semaglutide (Rybelsus) 14 MG TABS    Semaglutide 7 MG TABS    DISCONTINUED: Semaglutide 7 MG TABS   6  Morbid obesity ([facility] [address] )       Weight loss plan  Will resume Rybelsus to treat prediabetes and also beneficial for weight loss  Patient denies personal and family history of MEN2 tumors and medullary thyroid CA  Patient denies personal history of pancreatitis  Follow up in approximately 3 months with Non-Surgical Physician/Advanced Practitioner  Subjective:   Chief Complaint   Patient presents with    Follow-up       Patient ID: Sabi Fernandes  is a 46 y o  male with excess weight/obesity here to pursue weight management  Patient is pursuing Conservative Program      HPI  -Initial weight loss goal of 5-10% weight loss for improved health  Wt Readings from Last 10 Encounters:   06/21/22 (!) 175 kg (386 lb 12 8 oz)   03/29/22 (!) 173 kg (381 lb)   12/16/21 (!) 162 kg (358 lb)   09/16/21 (!) 166 kg (367 lb)   08/04/21 (!) 158 kg (348 lb)   07/28/21 (!) 158 kg (348 lb)   07/02/21 (!) 161 kg (356 lb)   05/20/21 (!) 164 kg (362 lb 9 6 oz)   05/11/21 (!) 166 kg (365 lb)   04/06/21 (!) 167 kg (368 lb 12 8 oz)     Pt has not followed up since 5/2021    Initial weight: 369 (4/2021)  Current weight: 386  Change in weight: +17 lbs  Goal:  5-10% STG, eventually <300    I started him on rybelsus in 4/2021 but he was unable to f/u after 5/2021  Went down to 347 lbs on rybelsus but could not afford after being put on [address]  Was eating cheap food that was affordable  Would like to go back on Rybelsus to help with weight loss  Back to limiting carbs  Wants to get back on track with food logging    Drinks- 1 gallon water   Alcohol- very rare   Exercise- walking daily      The following portions of the patient's history were reviewed and updated as appropriate: allergies, current medications, past family history, past medical history, past social history, past surgical history, and problem list     Review of Systems   Respiratory: Positive for shortness of breath  Cardiovascular: Negative  Gastrointestinal: Negative  Psychiatric/Behavioral: Negative  Objective:  /86 (BP Location: Left arm, Patient Position: Standing, Cuff Size: Large)   Pulse 74   Temp 98 °F (36 7 °C)   Resp 14   Ht 6' 1 23" (1 86 m)   Wt (!) 175 kg (386 lb 12 8 oz) Comment: Patient refused to remove shoes  SpO2 94%   BMI 50 71 kg/m²   Constitutional: Well-developed, well-nourished and obese Body mass index is 50 71 kg/m²  Thelda Fare HEENT: No conjunctival pallor or jaundice  Pulmonary: No increased work of breathing or signs of respiratory distress  CV: Normal rate, well-perfused  GI: Obese  Non-distended  MSK: No edema   Neuro: Oriented to person, place and time  Normal Speech  Normal gait  Psych: Normal affect and mood       Labs and Imaging  Reviewed

## 2022-06-22 ENCOUNTER — TELEPHONE (OUTPATIENT)
Dept: PULMONOLOGY | Facility: CLINIC | Age: 52
End: 2022-06-22

## 2022-06-22 ENCOUNTER — OFFICE VISIT (OUTPATIENT)
Dept: PULMONOLOGY | Facility: CLINIC | Age: 52
End: 2022-06-22
Payer: COMMERCIAL

## 2022-06-22 VITALS
OXYGEN SATURATION: 94 % | WEIGHT: 315 LBS | SYSTOLIC BLOOD PRESSURE: 120 MMHG | HEIGHT: 73 IN | BODY MASS INDEX: 41.75 KG/M2 | HEART RATE: 86 BPM | TEMPERATURE: 97.8 F | DIASTOLIC BLOOD PRESSURE: 84 MMHG

## 2022-06-22 DIAGNOSIS — E66.01 MORBID OBESITY (HCC): ICD-10-CM

## 2022-06-22 DIAGNOSIS — E88.81 INSULIN RESISTANCE: ICD-10-CM

## 2022-06-22 DIAGNOSIS — U07.1 COVID-19 VIRUS INFECTION: ICD-10-CM

## 2022-06-22 DIAGNOSIS — J45.40 MODERATE PERSISTENT ASTHMA WITHOUT COMPLICATION: ICD-10-CM

## 2022-06-22 DIAGNOSIS — J45.40 MODERATE PERSISTENT ASTHMA WITHOUT COMPLICATION: Primary | ICD-10-CM

## 2022-06-22 DIAGNOSIS — R73.03 PREDIABETES: ICD-10-CM

## 2022-06-22 DIAGNOSIS — R06.02 SOB (SHORTNESS OF BREATH) ON EXERTION: Primary | ICD-10-CM

## 2022-06-22 PROCEDURE — 99214 OFFICE O/P EST MOD 30 MIN: CPT | Performed by: INTERNAL MEDICINE

## 2022-06-22 PROCEDURE — 1036F TOBACCO NON-USER: CPT | Performed by: INTERNAL MEDICINE

## 2022-06-22 PROCEDURE — 3008F BODY MASS INDEX DOCD: CPT | Performed by: INTERNAL MEDICINE

## 2022-06-22 PROCEDURE — 94618 PULMONARY STRESS TESTING: CPT | Performed by: INTERNAL MEDICINE

## 2022-06-22 RX ORDER — BUDESONIDE 0.5 MG/2ML
0.5 INHALANT ORAL 2 TIMES DAILY
Qty: 120 ML | Refills: 0 | Status: SHIPPED | OUTPATIENT
Start: 2022-06-22 | End: 2022-07-22

## 2022-06-22 NOTE — PROGRESS NOTES
Assessment/Plan:   Diagnoses and all orders for this visit:    SOB (shortness of breath) on exertion  -     POCT 6 minute walk    Moderate persistent asthma without complication  -     budesonide (Pulmicort) 0 5 mg/2 mL nebulizer solution; Take 2 mL (0 5 mg total) by nebulization 2 (two) times a day Rinse mouth after use  COVID-19 virus infection    Morbid obesity ([address] )      shortness of breath and dyspnea on exertion with reactive airway disease moderate persistent asthma without complication not in any exacerbation currently   PFTs with FEV1 of 51% with response to the bronchodilator in the small airways 6 minute walk test did not need any supplemental oxygen before, today he walked in the office with his oxygen saturation dropping down very briefly to 86% and coming back up right away no need for any supplemental oxygen  Discussed with the patient to continue with Trelegy 1 puff daily  Rinse mouth after use  Again samples of Trelegy inhalers were given to the patient  Also he will continue with albuterol via nebulizer 4 times daily  Also added budesonide via nebulizer twice daily through the nebulizer on with the Trelegy  He states slowly his exercise tolerance is improving I will see him back in 6 weeks or p r n  earlier as needed   Recommend weight loss    Return in about 6 weeks (around 8/3/2022)  All questions are answered to the patient's satisfaction and understanding  He verbalizes understanding  He is encouraged to call with any further questions or concerns  Portions of the record may have been created with voice recognition software  Occasional wrong word or "sound a like" substitutions may have occurred due to the inherent limitations of voice recognition software  Read the chart carefully and recognize, using context, where substitutions have occurred      Electronically Signed by Porsche Wood MD    ______________________________________________________________________    Chief Complaint:   Chief Complaint   Patient presents with    Follow-up    Shortness of Breath       Patient ID: Roddy Schultz is a 46 y o  y o  male has a past medical history of Cough, Cough, Cough, High cholesterol, Obesity, Class III, BMI 40-49 9 (morbid obesity) ([facility] [address] ), SOB (shortness of breath), SOB (shortness of breath), SOB (shortness of breath), SOB (shortness of breath), SOB (shortness of breath), Wheezing, Wheezing, and Wheezing     6/22/2022  Patient presents today for follow-up visit  Roddy Schultz is a very pleasant 35-year-old gentleman, former smoker with less than 10 pack-year smoking history who quit a year ago, who is a  and a  states he has been doing 2 jobs for several years, until he contracted COVID-23 infection in December of 2020 states he was not hospitalized he could manage it at home with supportive treatment as well as the steroids he states, he was also given an inhaler given he was having shortness of breath and wheezing he states he has been having asthma in the past with allergies especially hay fever and and when his wheezing was bad after the COVID-19 infection he was given albuterol inhaler which he has been using once or twice a day he states  He is continuing to use the Trelegy 1 puff once a day and using the albuterol via nebulizer 3 times daily and he states he has been feeling better than before walking slightly longer distances and before  Although he is not back to his baseline  Review of Systems   Constitutional: Positive for fatigue  HENT: Negative  Eyes: Negative  Respiratory: Positive for shortness of breath and wheezing  Cardiovascular: Negative  Gastrointestinal: Negative  Endocrine: Negative  Genitourinary: Negative  Musculoskeletal: Negative  Allergic/Immunologic: Negative  Neurological: Negative  Hematological: Negative  Psychiatric/Behavioral: Negative          Smoking history: He reports that he quit smoking about 2 years ago  His smoking use included cigarettes  He started smoking about 32 years ago  He has a 7 50 pack-year smoking history  He has never used smokeless tobacco     The following portions of the patient's history were reviewed and updated as appropriate: allergies, current medications, past family history, past medical history, past social history, past surgical history and problem list       There is no immunization history on file for this patient  Current Outpatient Medications   Medication Sig Dispense Refill    albuterol (2 5 mg/3 mL) 0 083 % nebulizer solution Take 3 mL (2 5 mg total) by nebulization every 6 (six) hours as needed for wheezing or shortness of breath 120 mL 0    albuterol (PROVENTIL HFA,VENTOLIN HFA) 90 mcg/act inhaler Inhale 2 puffs every 6 (six) hours as needed for wheezing or shortness of breath 1 g 2    budesonide (Pulmicort) 0 5 mg/2 mL nebulizer solution Take 2 mL (0 5 mg total) by nebulization 2 (two) times a day Rinse mouth after use  120 mL 0    fluticasone (FLONASE) 50 mcg/act nasal spray SPRAY 1 SPRAY INTO EACH NOSTRIL EVERY DAY 16 mL 3    fluticasone-umeclidinium-vilanterol (Trelegy Ellipta) 200-62 5-25 MCG/INH AEPB inhaler Inhale 1 puff daily Rinse mouth after use   60 blister 0    metFORMIN (GLUCOPHAGE) 500 mg tablet Take 1 tablet (500 mg total) by mouth 2 (two) times a day with meals 60 tablet 5    rosuvastatin (CRESTOR) 5 mg tablet Take 1 tablet (5 mg total) by mouth daily 90 tablet 0    Semaglutide (Rybelsus) 14 MG TABS Take 14 mg by mouth daily After taking 7mg for 1 month 30 tablet 0    Semaglutide 3 MG TABS Take 3 mg by mouth in the morning 30 tablet 0    Semaglutide 7 MG TABS Take 7 mg by mouth in the morning After taking 3mg daily for one month 30 tablet 0    sildenafil (VIAGRA) 50 MG tablet Take 1 tablet (50 mg total) by mouth daily as needed for erectile dysfunction 10 tablet 0    busPIRone (BUSPAR) 5 mg tablet Take 1 tablet (5 mg total) by mouth 3 (three) times a day (Patient not taking: No sig reported) 30 tablet 0    ibuprofen (MOTRIN) 800 mg tablet Take 1 tablet (800 mg total) by mouth every 8 (eight) hours as needed for mild pain With 2 tylenol  (Patient not taking: No sig reported) 30 tablet 0    mupirocin (BACTROBAN) 2 % ointment Apply topically 3 (three) times a day (Patient not taking: No sig reported) 22 g 0    predniSONE 20 mg tablet Take 1 tablet (20 mg total) by mouth daily (Patient not taking: No sig reported) 5 tablet 0     No current facility-administered medications for this visit  Allergies: Patient has no known allergies  Objective:  Vitals:    06/22/22 1004   BP: 120/84   Pulse: 86   Temp: 97 8 °F (36 6 °C)   SpO2: 94%   Weight: (!) 173 kg (381 lb)   Height: 6' 1 23" (1 86 m)   Oxygen Therapy  SpO2: 94 %    Wt Readings from Last 3 Encounters:   06/22/22 (!) 173 kg (381 lb)   06/21/22 (!) 175 kg (386 lb 12 8 oz)   03/29/22 (!) 173 kg (381 lb)     Body mass index is 49 95 kg/m²  Physical Exam  Constitutional:       Appearance: He is well-developed  HENT:      Head: Normocephalic and atraumatic  Eyes:      Pupils: Pupils are equal, round, and reactive to light  Neck:      Comments: Short and wide neck  Cardiovascular:      Rate and Rhythm: Normal rate and regular rhythm  Heart sounds: Normal heart sounds  Pulmonary:      Effort: Pulmonary effort is normal       Breath sounds: Normal breath sounds  Musculoskeletal:         General: Normal range of motion  Cervical back: Normal range of motion and neck supple  Skin:     General: Skin is warm and dry  Neurological:      Mental Status: He is alert and oriented to person, place, and time  Psychiatric:         Behavior: Behavior normal            Diagnostics:  I have personally reviewed pertinent reports

## 2022-06-22 NOTE — LETTER
June 22, 2022     Patient: Ron Haque  YOB: 1970  Date of Visit: 6/22/2022      To Whom it May Concern:    Ron Haque is under my professional care  Ruthfrancesca Grant was seen in my office on 6/22/2022  Ruth Grant is still short of breath although slowly improving, will re-evaluate him in 6 weeks going back to work  If you have any questions or concerns, please don't hesitate to call           Sincerely,          Eloisa Isidro MD        CC: No Recipients

## 2022-06-24 ENCOUNTER — TELEPHONE (OUTPATIENT)
Dept: PULMONOLOGY | Facility: CLINIC | Age: 52
End: 2022-06-24

## 2022-07-08 RX ORDER — BUDESONIDE 0.5 MG/2ML
0.5 INHALANT ORAL 2 TIMES DAILY
Qty: 120 ML | Refills: 0 | Status: SHIPPED | OUTPATIENT
Start: 2022-07-08 | End: 2022-08-26 | Stop reason: ALTCHOICE

## 2022-07-13 DIAGNOSIS — E78.5 HYPERLIPIDEMIA, UNSPECIFIED HYPERLIPIDEMIA TYPE: ICD-10-CM

## 2022-07-13 DIAGNOSIS — E78.2 MIXED HYPERLIPIDEMIA: Primary | ICD-10-CM

## 2022-07-13 RX ORDER — ROSUVASTATIN CALCIUM 5 MG/1
5 TABLET, COATED ORAL DAILY
Qty: 90 TABLET | Refills: 0 | Status: SHIPPED | OUTPATIENT
Start: 2022-07-13 | End: 2022-07-14 | Stop reason: SDUPTHER

## 2022-07-14 DIAGNOSIS — E78.5 HYPERLIPIDEMIA, UNSPECIFIED HYPERLIPIDEMIA TYPE: ICD-10-CM

## 2022-07-14 RX ORDER — ROSUVASTATIN CALCIUM 5 MG/1
5 TABLET, COATED ORAL DAILY
Qty: 90 TABLET | Refills: 0 | Status: SHIPPED | OUTPATIENT
Start: 2022-07-14 | End: 2022-07-26 | Stop reason: SDUPTHER

## 2022-07-26 DIAGNOSIS — E78.5 HYPERLIPIDEMIA, UNSPECIFIED HYPERLIPIDEMIA TYPE: ICD-10-CM

## 2022-07-27 DIAGNOSIS — E78.5 HYPERLIPIDEMIA, UNSPECIFIED HYPERLIPIDEMIA TYPE: ICD-10-CM

## 2022-07-27 RX ORDER — ROSUVASTATIN CALCIUM 5 MG/1
5 TABLET, COATED ORAL DAILY
Qty: 90 TABLET | Refills: 0 | Status: SHIPPED | OUTPATIENT
Start: 2022-07-27 | End: 2022-07-27 | Stop reason: SDUPTHER

## 2022-07-27 RX ORDER — ROSUVASTATIN CALCIUM 5 MG/1
5 TABLET, COATED ORAL DAILY
Qty: 90 TABLET | Refills: 0 | Status: CANCELLED | OUTPATIENT
Start: 2022-07-27

## 2022-07-27 RX ORDER — ROSUVASTATIN CALCIUM 5 MG/1
5 TABLET, COATED ORAL DAILY
Qty: 90 TABLET | Refills: 0 | Status: SHIPPED | OUTPATIENT
Start: 2022-07-27 | End: 2022-09-12

## 2022-07-27 NOTE — TELEPHONE ENCOUNTER
Crow Perdomo his medicine needs to go to [facility] his insurance will not cover at [employer] please resend to [facility]

## 2022-08-26 ENCOUNTER — OFFICE VISIT (OUTPATIENT)
Dept: PULMONOLOGY | Facility: CLINIC | Age: 52
End: 2022-08-26
Payer: COMMERCIAL

## 2022-08-26 ENCOUNTER — TELEPHONE (OUTPATIENT)
Dept: PULMONOLOGY | Facility: CLINIC | Age: 52
End: 2022-08-26

## 2022-08-26 VITALS
HEART RATE: 91 BPM | TEMPERATURE: 98.2 F | SYSTOLIC BLOOD PRESSURE: 115 MMHG | RESPIRATION RATE: 18 BRPM | HEIGHT: 75 IN | OXYGEN SATURATION: 95 % | BODY MASS INDEX: 39.17 KG/M2 | DIASTOLIC BLOOD PRESSURE: 72 MMHG | WEIGHT: 315 LBS

## 2022-08-26 DIAGNOSIS — R06.02 SOB (SHORTNESS OF BREATH) ON EXERTION: Primary | ICD-10-CM

## 2022-08-26 DIAGNOSIS — E66.01 MORBID OBESITY (HCC): ICD-10-CM

## 2022-08-26 DIAGNOSIS — J45.40 MODERATE PERSISTENT ASTHMA WITHOUT COMPLICATION: ICD-10-CM

## 2022-08-26 DIAGNOSIS — J06.9 UPPER RESPIRATORY TRACT INFECTION, UNSPECIFIED TYPE: ICD-10-CM

## 2022-08-26 DIAGNOSIS — J45.909 UNCOMPLICATED ASTHMA, UNSPECIFIED ASTHMA SEVERITY, UNSPECIFIED WHETHER PERSISTENT: ICD-10-CM

## 2022-08-26 DIAGNOSIS — U07.1 COVID-19 VIRUS INFECTION: ICD-10-CM

## 2022-08-26 PROCEDURE — 99214 OFFICE O/P EST MOD 30 MIN: CPT | Performed by: INTERNAL MEDICINE

## 2022-08-26 RX ORDER — ALBUTEROL SULFATE 90 UG/1
2 AEROSOL, METERED RESPIRATORY (INHALATION) EVERY 6 HOURS PRN
Qty: 1 G | Refills: 2 | Status: SHIPPED | OUTPATIENT
Start: 2022-08-26

## 2022-08-26 NOTE — TELEPHONE ENCOUNTER
Pt called re: seng was in to see Dr Enrrique Becker today and Javan Ward was helping him with insurance issues  He stated all he needs is a doctors note stating that he's still under Dr Carlos Eduardo Jiang care and next appt is 11/14/22  Please e-mail completed doctors note to: [email or web address]

## 2022-08-26 NOTE — PROGRESS NOTES
Assessment/Plan:   Diagnoses and all orders for this visit:    SOB (shortness of breath) on exertion    Moderate persistent asthma without complication    Upper respiratory tract infection, unspecified type  -     albuterol (PROVENTIL HFA,VENTOLIN HFA) 90 mcg/act inhaler; Inhale 2 puffs every 6 (six) hours as needed for wheezing or shortness of breath    Uncomplicated asthma, unspecified asthma severity, unspecified whether persistent  -     fluticasone-umeclidinium-vilanterol (Trelegy Ellipta) 200-62 5-25 MCG/INH AEPB inhaler; Inhale 1 puff daily Rinse mouth after use  COVID-19 virus infection    Morbid obesity ([address] )      shortness of breath and dyspnea on exertion with reactive airway disease moderate persistent asthma without complication not in any exacerbation currently   PFTs with FEV1 of 51% with response to the bronchodilator in the small airways 6 minute walk test did not need any supplemental oxygen before, today he walked in the office with his oxygen saturation dropping down very briefly to 86% and coming back up right away no need for any supplemental oxygen  Discussed with the patient to continue with Trelegy 1 puff daily  Rinse mouth after use  Again samples of Trelegy inhalers were given to the patient  Also he will continue with albuterol via nebulizer 4 times daily  He was also added budesonide via nebulizer last visit with the Trelegy but he states he has not been using it  His exercise tolerance has improved he states  Walking longer distances  Encouraged patient to continue with his exercise regimen  Recommend weight loss also [facility]  Follow-up in 3 months or p r n  earlier as needed  Return in about 3 months (around 11/26/2022)  All questions are answered to the patient's satisfaction and understanding  He verbalizes understanding  He is encouraged to call with any further questions or concerns      Portions of the record may have been created with voice recognition software  Occasional wrong word or "sound a like" substitutions may have occurred due to the inherent limitations of voice recognition software  Read the chart carefully and recognize, using context, where substitutions have occurred  Electronically Signed by Deepthi Kincaid MD    ______________________________________________________________________    Chief Complaint:   Chief Complaint   Patient presents with    Follow-up       Patient ID: Noel Gaitan is a 46 y o  y o  male has a past medical history of Cough, Cough, Cough, High cholesterol, Obesity, Class III, BMI 40-49 9 (morbid obesity) ([facility] [address] ), SOB (shortness of breath), SOB (shortness of breath), SOB (shortness of breath), SOB (shortness of breath), SOB (shortness of breath), Wheezing, Wheezing, and Wheezing     8/26/2022  Patient presents today for follow-up visit  Noel Gaitan is a very pleasant 26-year-old gentleman, former smoker with less than 10 pack-year smoking history who quit a year ago, who is a  and a  states he has been doing 2 jobs for several years, until he contracted COVID-23 infection in December of 2020 states he was not hospitalized he could manage it at home with supportive treatment as well as the steroids he states, he was also given an inhaler given he was having shortness of breath and wheezing he states he has been having asthma in the past with allergies especially hay fever and and when his wheezing was bad after the COVID-19 infection he was given albuterol inhaler which he has been using once or twice a day he states  He is continuing to use the Trelegy 1 puff once a day and using the albuterol via nebulizer 3 times daily and he states he has been feeling better than before walking slightly longer distances than  before  Although he is not back to his baseline  Review of Systems   HENT: Negative  Eyes: Negative  Respiratory: Positive for shortness of breath and wheezing           States his symptoms of shortness of breath and wheezing have improved since last visit his endurance has improved he states  Although he states he is not up to where he was prior to pre COVID   Cardiovascular: Negative  Gastrointestinal: Negative  Endocrine: Negative  Musculoskeletal: Negative  Allergic/Immunologic: Negative  Neurological: Negative  Hematological: Negative  Psychiatric/Behavioral: Negative  Smoking history: He reports that he quit smoking about 2 years ago  His smoking use included cigarettes  He started smoking about 32 years ago  He has a 7 50 pack-year smoking history  He has never used smokeless tobacco     The following portions of the patient's history were reviewed and updated as appropriate: allergies, current medications, past family history, past medical history, past social history, past surgical history and problem list       There is no immunization history on file for this patient  Current Outpatient Medications   Medication Sig Dispense Refill    albuterol (2 5 mg/3 mL) 0 083 % nebulizer solution Take 3 mL (2 5 mg total) by nebulization every 6 (six) hours as needed for wheezing or shortness of breath 120 mL 0    albuterol (PROVENTIL HFA,VENTOLIN HFA) 90 mcg/act inhaler Inhale 2 puffs every 6 (six) hours as needed for wheezing or shortness of breath 1 g 2    fluticasone (FLONASE) 50 mcg/act nasal spray SPRAY 1 SPRAY INTO EACH NOSTRIL EVERY DAY 16 mL 3    fluticasone-umeclidinium-vilanterol (Trelegy Ellipta) 200-62 5-25 MCG/INH AEPB inhaler Inhale 1 puff daily Rinse mouth after use   180 blister 1    metFORMIN (GLUCOPHAGE) 500 mg tablet Take 1 tablet (500 mg total) by mouth 2 (two) times a day with meals 60 tablet 5    rosuvastatin (CRESTOR) 5 mg tablet Take 1 tablet (5 mg total) by mouth daily 90 tablet 0    Semaglutide (Rybelsus) 14 MG TABS Take 14 mg by mouth daily After taking 7mg for 1 month 30 tablet 0    Semaglutide 3 MG TABS Take 3 mg by mouth in the morning 30 tablet 0    Semaglutide 7 MG TABS Take 7 mg by mouth in the morning After taking 3mg daily for one month 30 tablet 0    sildenafil (VIAGRA) 50 MG tablet Take 1 tablet (50 mg total) by mouth daily as needed for erectile dysfunction 10 tablet 0    busPIRone (BUSPAR) 5 mg tablet Take 1 tablet (5 mg total) by mouth 3 (three) times a day 30 tablet 0    ibuprofen (MOTRIN) 800 mg tablet Take 1 tablet (800 mg total) by mouth every 8 (eight) hours as needed for mild pain With 2 tylenol  30 tablet 0    mupirocin (BACTROBAN) 2 % ointment Apply topically 3 (three) times a day 22 g 0    predniSONE 20 mg tablet Take 1 tablet (20 mg total) by mouth daily 5 tablet 0     No current facility-administered medications for this visit  Allergies: Patient has no known allergies  Objective:  Vitals:    08/26/22 0814 08/26/22 0817   BP: 115/72    BP Location: Left arm    Patient Position: Sitting    Cuff Size: Large    Pulse: 91    Resp: 18    Temp: 98 2 °F (36 8 °C)    TempSrc: Tympanic    SpO2: 95% 95%   Weight: (!) 173 kg (381 lb)    Height: 6' 3" (1 905 m)    Oxygen Therapy  SpO2: 95 %  Oxygen Therapy: None (Room air)    Wt Readings from Last 3 Encounters:   08/26/22 (!) 173 kg (381 lb)   06/22/22 (!) 173 kg (381 lb)   06/21/22 (!) 175 kg (386 lb 12 8 oz)     Body mass index is 47 62 kg/m²  Physical Exam  Constitutional:       Appearance: He is well-developed  HENT:      Head: Normocephalic and atraumatic  Eyes:      Pupils: Pupils are equal, round, and reactive to light  Neck:      Comments: Short and wide neck  Cardiovascular:      Rate and Rhythm: Normal rate and regular rhythm  Heart sounds: Normal heart sounds  Pulmonary:      Effort: Pulmonary effort is normal       Breath sounds: Normal breath sounds  Musculoskeletal:         General: Normal range of motion  Cervical back: Normal range of motion and neck supple  Skin:     General: Skin is warm and dry     Neurological:      Mental Status: He is alert and oriented to person, place, and time     Psychiatric:         Behavior: Behavior normal            Diagnostics:  I have personally reviewed pertinent films in PACS

## 2022-08-30 DIAGNOSIS — R73.03 PREDIABETES: ICD-10-CM

## 2022-08-30 DIAGNOSIS — E88.81 INSULIN RESISTANCE: ICD-10-CM

## 2022-08-30 DIAGNOSIS — E78.2 MIXED HYPERLIPIDEMIA: Primary | ICD-10-CM

## 2022-08-30 DIAGNOSIS — E66.01 CLASS 3 SEVERE OBESITY DUE TO EXCESS CALORIES WITHOUT SERIOUS COMORBIDITY WITH BODY MASS INDEX (BMI) OF 45.0 TO 49.9 IN ADULT (HCC): ICD-10-CM

## 2022-09-09 DIAGNOSIS — E78.5 HYPERLIPIDEMIA, UNSPECIFIED HYPERLIPIDEMIA TYPE: ICD-10-CM

## 2022-09-11 ENCOUNTER — NURSE TRIAGE (OUTPATIENT)
Dept: OTHER | Facility: OTHER | Age: 52
End: 2022-09-11

## 2022-09-11 ENCOUNTER — HOSPITAL ENCOUNTER (EMERGENCY)
Facility: HOSPITAL | Age: 52
Discharge: HOME | End: 2022-09-11
Attending: EMERGENCY MEDICINE | Admitting: EMERGENCY MEDICINE
Payer: COMMERCIAL

## 2022-09-11 VITALS
TEMPERATURE: 97.8 F | HEART RATE: 112 BPM | RESPIRATION RATE: 20 BRPM | DIASTOLIC BLOOD PRESSURE: 68 MMHG | SYSTOLIC BLOOD PRESSURE: 137 MMHG | OXYGEN SATURATION: 95 %

## 2022-09-11 DIAGNOSIS — S81.852A DOG BITE OF MULTIPLE SITES OF LEFT LOWER EXTREMITY, INITIAL ENCOUNTER: Primary | ICD-10-CM
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
## 2022-09-11 PROCEDURE — 90472 IMMUNIZATION ADMIN EACH ADD: CPT

## 2022-09-11 PROCEDURE — 90471 IMMUNIZATION ADMIN: CPT

## 2022-09-11 PROCEDURE — 90715 TDAP VACCINE 7 YRS/> IM: CPT | Performed by: PHYSICIAN ASSISTANT

## 2022-09-11 PROCEDURE — 90675 RABIES VACCINE IM: CPT | Performed by: PHYSICIAN ASSISTANT

## 2022-09-11 PROCEDURE — 99283 EMERGENCY DEPT VISIT LOW MDM: CPT

## 2022-09-11 PROCEDURE — 99284 EMERGENCY DEPT VISIT MOD MDM: CPT | Performed by: PHYSICIAN ASSISTANT

## 2022-09-11 PROCEDURE — 96372 THER/PROPH/DIAG INJ SC/IM: CPT

## 2022-09-11 PROCEDURE — 90375 RABIES IG IM/SC: CPT | Performed by: PHYSICIAN ASSISTANT

## 2022-09-11 RX ORDER — AMOXICILLIN AND CLAVULANATE POTASSIUM 875; 125 MG/1; MG/1
1 TABLET, FILM COATED ORAL EVERY 12 HOURS
Qty: 14 TABLET | Refills: 0 | Status: SHIPPED | OUTPATIENT
Start: 2022-09-11 | End: 2022-09-18

## 2022-09-11 RX ORDER — AMOXICILLIN AND CLAVULANATE POTASSIUM 875; 125 MG/1; MG/1
1 TABLET, FILM COATED ORAL ONCE
Status: COMPLETED | OUTPATIENT
Start: 2022-09-11 | End: 2022-09-11

## 2022-09-11 RX ADMIN — RABIES IMMUNE GLOBULIN (HUMAN) 3450 UNITS: 300 INJECTION, SOLUTION INFILTRATION; INTRAMUSCULAR at 22:05

## 2022-09-11 RX ADMIN — TETANUS TOXOID, REDUCED DIPHTHERIA TOXOID AND ACELLULAR PERTUSSIS VACCINE, ADSORBED 0.5 ML: 5; 2.5; 8; 8; 2.5 SUSPENSION INTRAMUSCULAR at 22:05

## 2022-09-11 RX ADMIN — RABIES VIRUS STRAIN PM-1503-3M ANTIGEN (PROPIOLACTONE INACTIVATED) AND WATER 1 ML: KIT at 22:05

## 2022-09-11 RX ADMIN — AMOXICILLIN AND CLAVULANATE POTASSIUM 1 TABLET: 875; 125 TABLET, FILM COATED ORAL at 22:05

## 2022-09-12 RX ORDER — ROSUVASTATIN CALCIUM 5 MG/1
TABLET, COATED ORAL
Qty: 90 TABLET | Refills: 0 | Status: SHIPPED | OUTPATIENT
Start: 2022-09-12

## 2022-09-12 NOTE — TELEPHONE ENCOUNTER
Reason for Disposition   [1] Any break in skin from BITE (e g , cut, puncture or scratch) AND[2] PET animial (e g , dog, cat, or ferret) at risk for RABIES (e g , sick, stray, unprovoked bite, developing country)    Answer Assessment - Initial Assessment Questions  1  ANIMAL: "What type of animal caused the bite?" "Is the injury from a bite or a claw?" If the animal is a dog or a cat, ask: "Was it a pet or a stray?" "Was it acting ill or behaving strangely?"      Last night got bit twice by a pit bull; on knee cap and back of calf  State police are involved  Owner of the dog is unsure if dog is UTD on rabies vaccinations  Patient has been unable to get in contact with anyone regarding vaccination status  2  LOCATION: "Where is the bite located?"       Leg; two puncture wounds  3  SIZE: "How big is the bite?" "What does it look like?"       Left leg  4  ONSET: "When did the bite happen?" (Minutes or hours ago)       Last night around same time  5  CIRCUMSTANCES: "Tell me how this happened "       See above  6   TETANUS: "When was the last tetanus booster?"      Unsure    Protocols used: [facility]AL BITE-[facility]ULTMag[facility]

## 2022-09-12 NOTE — ED PROVIDER NOTES
History  Chief Complaint   Patient presents with    Dog Bite     Dog bite on the back of left leg, minimal injury to the skin, however, unsure if dog was vaccinated  Patient is a 59-year-old male with no significant past medical history presenting to the emergency department for evaluation of a dog bite to his left leg that occurred approximately 24 hours ago  Does not know the vaccination status of the dog  Tetanus shot not up-to-date  Reporting a small puncture wound to his left knee cap and left calf  Minimal pain  No fevers, chills, chest pain, difficulty breathing  No other complaints at this time  History provided by:  Patient   used: No    Dog Bite  Contact animal:  Dog  Location:  Leg  Leg injury location:  L lower leg and L knee  Time since incident:  24 minutes  Pain details:     Severity:  No pain    Progression:  Unchanged  Incident location:  Outside  Provoked: unprovoked    Notifications:  None  Animal's rabies vaccination status:  Unknown  Animal in possession: no    Tetanus status:  Out of date  Worsened by:  Nothing  Ineffective treatments:  None tried  Associated symptoms: no fever, no numbness, no rash and no swelling        Prior to Admission Medications   Prescriptions Last Dose Informant Patient Reported? Taking?    Semaglutide (Rybelsus) 14 MG TABS   No No   Sig: Take 14 mg by mouth daily After taking 7mg for 1 month   Semaglutide 3 MG TABS  Self No No   Sig: Take 3 mg by mouth in the morning   Semaglutide 7 MG TABS  Self No No   Sig: Take 7 mg by mouth in the morning After taking 3mg daily for one month   albuterol (2 5 mg/3 mL) 0 083 % nebulizer solution  Self No No   Sig: Take 3 mL (2 5 mg total) by nebulization every 6 (six) hours as needed for wheezing or shortness of breath   albuterol (PROVENTIL HFA,VENTOLIN HFA) 90 mcg/act inhaler   No No   Sig: Inhale 2 puffs every 6 (six) hours as needed for wheezing or shortness of breath   busPIRone (BUSPAR) 5 mg tablet  Self No No   Sig: Take 1 tablet (5 mg total) by mouth 3 (three) times a day   fluticasone (FLONASE) 50 mcg/act nasal spray  Self No No   Sig: SPRAY 1 SPRAY INTO EACH NOSTRIL EVERY DAY   fluticasone-umeclidinium-vilanterol (Trelegy Ellipta) 200-62 5-25 MCG/INH AEPB inhaler   No No   Sig: Inhale 1 puff daily Rinse mouth after use  ibuprofen (MOTRIN) 800 mg tablet  Self No No   Sig: Take 1 tablet (800 mg total) by mouth every 8 (eight) hours as needed for mild pain With 2 tylenol  metFORMIN (GLUCOPHAGE) 500 mg tablet   No No   Sig: Take 1 tablet (500 mg total) by mouth 2 (two) times a day with meals   mupirocin (BACTROBAN) 2 % ointment  Self No No   Sig: Apply topically 3 (three) times a day   predniSONE 20 mg tablet  Self No No   Sig: Take 1 tablet (20 mg total) by mouth daily   rosuvastatin (CRESTOR) 5 mg tablet  Self No No   Sig: Take 1 tablet (5 mg total) by mouth daily   sildenafil (VIAGRA) 50 MG tablet  Self No No   Sig: Take 1 tablet (50 mg total) by mouth daily as needed for erectile dysfunction      Facility-Administered Medications: None       Past Medical History:   Diagnosis Date    Cough     Cough     Cough     High cholesterol     Obesity, Class III, BMI 40-49 9 (morbid obesity) (HCC)     SOB (shortness of breath)     SOB (shortness of breath)     SOB (shortness of breath)     SOB (shortness of breath)     SOB (shortness of breath)     Wheezing     Wheezing     Wheezing        Past Surgical History:   Procedure Laterality Date    APPENDECTOMY  1    SHOULDER SURGERY  [id number]    several       Family History   Problem Relation Age of Onset    Drug abuse Mother     Lung cancer Father      I have reviewed and agree with the history as documented      E-Cigarette/Vaping    E-Cigarette Use Never User      E-Cigarette/Vaping Substances    Nicotine No     THC No     CBD No     Flavoring No     Other No     Unknown No      Social History     Tobacco Use    Smoking status: Former Smoker     Packs/day: 0 25     Years: 30 00     Pack years: 7 50     Types: Cigarettes     Start date:      Quit date:      Years since quittin 6    Smokeless tobacco: Never Used   Vaping Use    Vaping Use: Never used   Substance Use Topics    Alcohol use: Yes     Comment: socially    Drug use: Never       Review of Systems   Constitutional: Negative for chills and fever  Respiratory: Negative for shortness of breath  Cardiovascular: Negative for chest pain  Gastrointestinal: Negative for abdominal distention, diarrhea, nausea and vomiting  Skin: Positive for wound  Negative for rash  Neurological: Negative for numbness  All other systems reviewed and are negative  Physical Exam  Physical Exam  Vitals reviewed  Constitutional:       General: He is not in acute distress  Appearance: Normal appearance  He is obese  He is not ill-appearing, toxic-appearing or diaphoretic  HENT:      Head: Normocephalic and atraumatic  Right Ear: External ear normal       Left Ear: External ear normal    Eyes:      General: No scleral icterus  Right eye: No discharge  Left eye: No discharge  Extraocular Movements: Extraocular movements intact  Conjunctiva/sclera: Conjunctivae normal    Cardiovascular:      Rate and Rhythm: Normal rate and regular rhythm  Pulses: Normal pulses  Heart sounds: Normal heart sounds  No murmur heard  No friction rub  No gallop  Pulmonary:      Effort: Pulmonary effort is normal  No respiratory distress  Breath sounds: Normal breath sounds  No stridor  No wheezing, rhonchi or rales  Musculoskeletal:         General: Normal range of motion  Cervical back: Normal range of motion and neck supple  Right lower leg: No edema  Left lower leg: No edema  Skin:     General: Skin is warm and dry  Capillary Refill: Capillary refill takes less than 2 seconds        Findings: Wound (Small puncture wound to the left E cap, small puncture wound to the left calf  No drainage  No surrounding erythema ) present  Neurological:      General: No focal deficit present  Mental Status: He is alert and oriented to person, place, and time  Psychiatric:         Mood and Affect: Mood normal          Behavior: Behavior normal          Vital Signs  ED Triage Vitals [09/11/22 2140]   Temperature Pulse Respirations Blood Pressure SpO2   97 8 °F (36 6 °C) (!) 112 20 137/68 95 %      Temp src Heart Rate Source Patient Position - Orthostatic VS BP Location FiO2 (%)   -- -- -- -- --      Pain Score       --           Vitals:    09/11/22 2140   BP: 137/68   Pulse: (!) 112         Visual Acuity      ED Medications  Medications   rabies immune globulin, human (HyperRAB) injection 3,450 Units (has no administration in time range)   amoxicillin-clavulanate (AUGMENTIN) 875-125 mg per tablet 1 tablet (1 tablet Oral Given 9/11/22 2205)   rabies vaccine, human diploid (IMOVAX RABIES) IM injection 1 mL (1 mL Intramuscular Given 9/11/22 2205)   tetanus-diphtheria-acellular pertussis (BOOSTRIX) IM injection 0 5 mL (0 5 mL Intramuscular Given 9/11/22 2205)       Diagnostic Studies  Results Reviewed     None                 No orders to display              Procedures  Procedures         ED Course                               SBIRT 22yo+    Flowsheet Row Most Recent Value   SBIRT (25 yo +)    In order to provide better care to our patients, we are screening all of our patients for alcohol and drug use  Would it be okay to ask you these screening questions? Yes Filed at: 09/11/2022 2148   Initial Alcohol Screen: US AUDIT-C     1  How often do you have a drink containing alcohol? 0 Filed at: 09/11/2022 2148   2  How many drinks containing alcohol do you have on a typical day you are drinking? 0 Filed at: 09/11/2022 2148   3a  Male UNDER 65: How often do you have five or more drinks on one occasion? 0 Filed at: 09/11/2022 2148   3b   FEMALE Any Age, or MALE 65+: How often do you have 4 or more drinks on one occassion? 0 Filed at: 09/11/2022 2148   Audit-C Score 0 Filed at: 09/11/2022 2148   CEDRIC: How many times in the past year have you    Used an illegal drug or used a prescription medication for non-medical reasons? Never Filed at: 09/11/2022 2148                    MDM  Number of Diagnoses or Management Options  Diagnosis management comments: Patient presenting for evaluation of dog bite  Vaccination status of dog is unknown  Patient's tetanus vaccination is out of date  Two small puncture wounds to the left lower extremity, no signs of infection  Tetanus vaccination updated in the emergency department  Patient also given rabies immunoglobulin, rabies vaccination  Started on Augmentin  Discharged home in stable condition with strict return precautions  Patient Progress  Patient progress: stable      Disposition  Final diagnoses:   Dog bite of multiple sites of left lower extremity, initial encounter     Time reflects when diagnosis was documented in both MDM as applicable and the Disposition within this note     Time User Action Codes Description Comment    9/11/2022 10:05 PM Olamide Hackett, [address],  [address]  [id number]] Dog bite of multiple sites of left lower extremity, initial encounter       ED Disposition     ED Disposition   Discharge    Condition   Stable    Date/Time   Sun Sep 11, 2022 10:05 PM    Comment   Collin Castillo discharge to home/self care                 Follow-up Information     Follow up With Specialties Details Why Contact Info Additional [address] [facility] Emergency Department Emergency Medicine Go to  If symptoms worsen [address] [facility] [id number] [facility] Emergency Department, [address], [place], [address] [place] [address], [address] [place] [address], Nurse Practitioner Schedule an appointment as soon as possible for a visit  For follow up [facility]  [phone]             Patient's Medications   Discharge Prescriptions    AMOXICILLIN-CLAVULANATE (AUGMENTIN) 875-125 MG PER TABLET    Take 1 tablet by mouth every 12 (twelve) hours for 7 days       Start Date: 9/11/2022 End Date: 9/18/2022       Order Dose: 1 tablet       Quantity: 14 tablet    Refills: 0       No discharge procedures on file      PDMP Review     None          ED Provider  Electronically Signed by           Home Hutchinson PA-C  09/11/22 6154

## 2022-09-12 NOTE — TELEPHONE ENCOUNTER
Regarding: dog bite merly   ----- Message from Bud Sibley sent at 9/11/2022  8:08 PM EDT -----  'I got bite by a dog twice in my leg and I wanted to get some advice as what to do,"

## 2022-09-14 ENCOUNTER — HOSPITAL ENCOUNTER (EMERGENCY)
Facility: HOSPITAL | Age: 52
Discharge: HOME | End: 2022-09-14
Attending: EMERGENCY MEDICINE
Payer: COMMERCIAL

## 2022-09-14 VITALS
OXYGEN SATURATION: 94 % | HEIGHT: 76 IN | HEART RATE: 98 BPM | WEIGHT: 315 LBS | SYSTOLIC BLOOD PRESSURE: 136 MMHG | TEMPERATURE: 97.7 F | BODY MASS INDEX: 38.36 KG/M2 | RESPIRATION RATE: 18 BRPM | DIASTOLIC BLOOD PRESSURE: 90 MMHG

## 2022-09-14 DIAGNOSIS — Z23 ENCOUNTER FOR REPEAT ADMINISTRATION OF RABIES VACCINATION: Primary | ICD-10-CM

## 2022-09-14 PROCEDURE — 90471 IMMUNIZATION ADMIN: CPT

## 2022-09-14 PROCEDURE — 99281 EMR DPT VST MAYX REQ PHY/QHP: CPT | Performed by: EMERGENCY MEDICINE

## 2022-09-14 PROCEDURE — 90675 RABIES VACCINE IM: CPT | Performed by: EMERGENCY MEDICINE

## 2022-09-14 RX ADMIN — RABIES VIRUS STRAIN PM-1503-3M ANTIGEN (PROPIOLACTONE INACTIVATED) AND WATER 1 ML: KIT at 22:29

## 2022-09-15 ENCOUNTER — TELEPHONE (OUTPATIENT)
Dept: FAMILY MEDICINE CLINIC | Facility: CLINIC | Age: 52
End: 2022-09-15

## 2022-09-15 DIAGNOSIS — Z11.52 ENCOUNTER FOR SCREENING FOR COVID-19: Primary | ICD-10-CM

## 2022-09-15 NOTE — TELEPHONE ENCOUNTER
Pt is asking for SARS-CoV-2 antibody (often referred to as serology) tests to be ordered by Antonia Gonzalez  -  to look for antibodies in a sample to determine if an individual has had a past infection with the virus that causes COVID-19  COVID-19  Pt aware of serology test may not be covered by insurance and will be considered as out of pocket expense and aware of not accuracy of results  Pt insists on test to be ordered    *pt needs this for "legal reasons"      Please advise

## 2022-09-15 NOTE — TELEPHONE ENCOUNTER
Pt notified       Next Appt  With Family Medicine Jennifer Santana, [address])  10/05/2022 at 8:30 AM

## 2022-09-15 NOTE — ED PROVIDER NOTES
History  Chief Complaint   Patient presents with    Follow Up Rabies     Second vaccine     51-year-old male, dog bite 3 days ago - unknown vaccine status of dog, represents for 2nd vaccine  Wounds healing well, on ABX  Prior to Admission Medications   Prescriptions Last Dose Informant Patient Reported? Taking? Semaglutide (Rybelsus) 14 MG TABS   No No   Sig: Take 14 mg by mouth daily After taking 7mg for 1 month   Semaglutide 3 MG TABS  Self No No   Sig: Take 3 mg by mouth in the morning   Semaglutide 7 MG TABS  Self No No   Sig: Take 7 mg by mouth in the morning After taking 3mg daily for one month   albuterol (2 5 mg/3 mL) 0 083 % nebulizer solution  Self No No   Sig: Take 3 mL (2 5 mg total) by nebulization every 6 (six) hours as needed for wheezing or shortness of breath   albuterol (PROVENTIL HFA,VENTOLIN HFA) 90 mcg/act inhaler   No No   Sig: Inhale 2 puffs every 6 (six) hours as needed for wheezing or shortness of breath   amoxicillin-clavulanate (AUGMENTIN) 875-125 mg per tablet   No No   Sig: Take 1 tablet by mouth every 12 (twelve) hours for 7 days   busPIRone (BUSPAR) 5 mg tablet  Self No No   Sig: Take 1 tablet (5 mg total) by mouth 3 (three) times a day   fluticasone (FLONASE) 50 mcg/act nasal spray  Self No No   Sig: SPRAY 1 SPRAY INTO EACH NOSTRIL EVERY DAY   fluticasone-umeclidinium-vilanterol (Trelegy Ellipta) 200-62 5-25 MCG/INH AEPB inhaler   No No   Sig: Inhale 1 puff daily Rinse mouth after use  ibuprofen (MOTRIN) 800 mg tablet  Self No No   Sig: Take 1 tablet (800 mg total) by mouth every 8 (eight) hours as needed for mild pain With 2 tylenol     metFORMIN (GLUCOPHAGE) 500 mg tablet   No No   Sig: Take 1 tablet (500 mg total) by mouth 2 (two) times a day with meals   mupirocin (BACTROBAN) 2 % ointment  Self No No   Sig: Apply topically 3 (three) times a day   predniSONE 20 mg tablet  Self No No   Sig: Take 1 tablet (20 mg total) by mouth daily   rosuvastatin (CRESTOR) 5 mg tablet   No No   Sig: TAKE 1 TABLET BY MOUTH  DAILY   sildenafil (VIAGRA) 50 MG tablet  Self No No   Sig: Take 1 tablet (50 mg total) by mouth daily as needed for erectile dysfunction      Facility-Administered Medications: None       Past Medical History:   Diagnosis Date    Cough     Cough     Cough     High cholesterol     Obesity, Class III, BMI 40-49 9 (morbid obesity) (HCC)     SOB (shortness of breath)     SOB (shortness of breath)     SOB (shortness of breath)     SOB (shortness of breath)     SOB (shortness of breath)     Wheezing     Wheezing     Wheezing        Past Surgical History:   Procedure Laterality Date    APPENDECTOMY  1    SHOULDER SURGERY  6202-1448    several       Family History   Problem Relation Age of Onset    Drug abuse Mother     Lung cancer Father      I have reviewed and agree with the history as documented  E-Cigarette/Vaping    E-Cigarette Use Never User      E-Cigarette/Vaping Substances    Nicotine No     THC No     CBD No     Flavoring No     Other No     Unknown No      Social History     Tobacco Use    Smoking status: Former Smoker     Packs/day: 0 25     Years: 30 00     Pack years: 7 50     Types: Cigarettes     Start date: 200     Quit date:      Years since quittin 7    Smokeless tobacco: Never Used   Vaping Use    Vaping Use: Never used   Substance Use Topics    Alcohol use: Yes     Comment: socially    Drug use: Never       Review of Systems   Skin: Positive for wound  Dog bite, left leg, healing well    All other systems reviewed and are negative  Physical Exam  Physical Exam  Vitals reviewed  Constitutional:       General: He is not in acute distress  Appearance: He is well-developed  He is not diaphoretic  HENT:      Head: Normocephalic and atraumatic  Eyes:      Conjunctiva/sclera: Conjunctivae normal    Pulmonary:      Effort: Pulmonary effort is normal  No respiratory distress        Breath sounds: Normal breath sounds  Musculoskeletal:         General: Normal range of motion  Cervical back: Normal range of motion and neck supple  Comments: Dog bite, left leg, healing well, no signs of infection  No drainage, no streaking redness  Skin:     General: Skin is warm and dry  Coloration: Skin is not pale  Neurological:      Mental Status: He is alert and oriented to person, place, and time  Cranial Nerves: No cranial nerve deficit  Psychiatric:         Behavior: Behavior normal          Vital Signs  ED Triage Vitals [09/14/22 2224]   Temperature Pulse Respirations Blood Pressure SpO2   97 7 °F (36 5 °C) 98 18 136/90 94 %      Temp Source Heart Rate Source Patient Position - Orthostatic VS BP Location FiO2 (%)   Tympanic Monitor Sitting Left arm --      Pain Score       --           Vitals:    09/14/22 2224   BP: 136/90   Pulse: 98   Patient Position - Orthostatic VS: Sitting         Visual Acuity      ED Medications  Medications   rabies vaccine, human diploid (IMOVAX RABIES) IM injection 1 mL (1 mL Intramuscular Given 9/14/22 2229)       Diagnostic Studies  Results Reviewed     None                 No orders to display              Procedures  Procedures         ED Course                               SBIRT 20yo+    Flowsheet Row Most Recent Value   SBIRT (23 yo +)    In order to provide better care to our patients, we are screening all of our patients for alcohol and drug use  Would it be okay to ask you these screening questions? Yes Filed at: 09/14/2022 2227   Initial Alcohol Screen: US AUDIT-C     1  How often do you have a drink containing alcohol? 0 Filed at: 09/14/2022 2227   2  How many drinks containing alcohol do you have on a typical day you are drinking? 0 Filed at: 09/14/2022 2227   3a  Male UNDER 65: How often do you have five or more drinks on one occasion? 0 Filed at: 09/14/2022 2227   3b  FEMALE Any Age, or MALE 65+:  How often do you have 4 or more drinks on one occassion? 0 Filed at: 09/14/2022 2227   Audit-C Score 0 Filed at: 09/14/2022 2227   CEDRIC: How many times in the past year have you    Used an illegal drug or used a prescription medication for non-medical reasons? Never Filed at: 09/14/2022 2227                    [facility]  Number of Diagnoses or Management Options  Encounter for repeat administration of rabies vaccination  Diagnosis management comments: 51-year-old presents for 2nd rabies vaccine  Discussed vaccine schedule for follow up      Disposition  Final diagnoses:   Encounter for repeat administration of rabies vaccination     Time reflects when diagnosis was documented in both MDM as applicable and the Disposition within this note     Time User Action Codes Description Comment    9/14/2022 10:38 PM Conner Dent Add [Z23] Encounter for repeat administration of rabies vaccination       ED Disposition     ED Disposition   Discharge    Condition   Stable    Date/Time   Wed Sep 14, 2022 10:38 PM    Comment   Skinny Kerr discharge to home/self care  Follow-up Information    None         Patient's Medications   Discharge Prescriptions    No medications on file       No discharge procedures on file      PDMP Review     None          ED Provider  Electronically Signed by           Drew Plummer DO  09/14/22 4444

## 2022-09-19 ENCOUNTER — HOSPITAL ENCOUNTER (EMERGENCY)
Facility: HOSPITAL | Age: 52
Discharge: HOME | End: 2022-09-19
Attending: EMERGENCY MEDICINE
Payer: COMMERCIAL

## 2022-09-19 VITALS
SYSTOLIC BLOOD PRESSURE: 128 MMHG | HEART RATE: 88 BPM | TEMPERATURE: 97.2 F | OXYGEN SATURATION: 93 % | RESPIRATION RATE: 16 BRPM | DIASTOLIC BLOOD PRESSURE: 68 MMHG

## 2022-09-19 DIAGNOSIS — Z23 NEED FOR IMMUNIZATION AGAINST RABIES: Primary | ICD-10-CM

## 2022-09-19 PROCEDURE — 90471 IMMUNIZATION ADMIN: CPT

## 2022-09-19 PROCEDURE — 99282 EMERGENCY DEPT VISIT SF MDM: CPT | Performed by: EMERGENCY MEDICINE

## 2022-09-19 PROCEDURE — 90675 RABIES VACCINE IM: CPT | Performed by: EMERGENCY MEDICINE

## 2022-09-19 RX ADMIN — RABIES VIRUS STRAIN PM-1503-3M ANTIGEN (PROPIOLACTONE INACTIVATED) AND WATER 1 ML: KIT at 22:29

## 2022-09-20 NOTE — ED PROVIDER NOTES
History  Chief Complaint   Patient presents with    Immunizations     Pt states he is here for his third rabies vaccination  Last dose 9/14/22       80-year-old male presents emergency room for his 3rd rabies vaccine  He states that he was seen here a week ago after being bit on his leg by an unvaccinated dog  Stats that he is currently on abx  Denies any redness, swelliing, or drainage of the wounds  No other complaints  History provided by:  Patient      Prior to Admission Medications   Prescriptions Last Dose Informant Patient Reported? Taking? Semaglutide (Rybelsus) 14 MG TABS   No No   Sig: Take 14 mg by mouth daily After taking 7mg for 1 month   Semaglutide 3 MG TABS  Self No No   Sig: Take 3 mg by mouth in the morning   Semaglutide 7 MG TABS  Self No No   Sig: Take 7 mg by mouth in the morning After taking 3mg daily for one month   albuterol (2 5 mg/3 mL) 0 083 % nebulizer solution  Self No No   Sig: Take 3 mL (2 5 mg total) by nebulization every 6 (six) hours as needed for wheezing or shortness of breath   albuterol (PROVENTIL HFA,VENTOLIN HFA) 90 mcg/act inhaler   No No   Sig: Inhale 2 puffs every 6 (six) hours as needed for wheezing or shortness of breath   amoxicillin-clavulanate (AUGMENTIN) 875-125 mg per tablet   No No   Sig: Take 1 tablet by mouth every 12 (twelve) hours for 7 days   busPIRone (BUSPAR) 5 mg tablet  Self No No   Sig: Take 1 tablet (5 mg total) by mouth 3 (three) times a day   fluticasone (FLONASE) 50 mcg/act nasal spray  Self No No   Sig: SPRAY 1 SPRAY INTO EACH NOSTRIL EVERY DAY   fluticasone-umeclidinium-vilanterol (Trelegy Ellipta) 200-62 5-25 MCG/INH AEPB inhaler   No No   Sig: Inhale 1 puff daily Rinse mouth after use  ibuprofen (MOTRIN) 800 mg tablet  Self No No   Sig: Take 1 tablet (800 mg total) by mouth every 8 (eight) hours as needed for mild pain With 2 tylenol     metFORMIN (GLUCOPHAGE) 500 mg tablet   No No   Sig: Take 1 tablet (500 mg total) by mouth 2 (two) times a day with meals   mupirocin (BACTROBAN) 2 % ointment  Self No No   Sig: Apply topically 3 (three) times a day   predniSONE 20 mg tablet  Self No No   Sig: Take 1 tablet (20 mg total) by mouth daily   rosuvastatin (CRESTOR) 5 mg tablet   No No   Sig: TAKE 1 TABLET BY MOUTH  DAILY   sildenafil (VIAGRA) 50 MG tablet  Self No No   Sig: Take 1 tablet (50 mg total) by mouth daily as needed for erectile dysfunction      Facility-Administered Medications: None       Past Medical History:   Diagnosis Date    Cough     Cough     Cough     High cholesterol     Obesity, Class III, BMI 40-49 9 (morbid obesity) (HCC)     SOB (shortness of breath)     SOB (shortness of breath)     SOB (shortness of breath)     SOB (shortness of breath)     SOB (shortness of breath)     Wheezing     Wheezing     Wheezing        Past Surgical History:   Procedure Laterality Date    APPENDECTOMY  1    SHOULDER SURGERY  [id number]    several       Family History   Problem Relation Age of Onset    Drug abuse Mother     Lung cancer Father      I have reviewed and agree with the history as documented  E-Cigarette/Vaping    E-Cigarette Use Never User      E-Cigarette/Vaping Substances    Nicotine No     THC No     CBD No     Flavoring No     Other No     Unknown No      Social History     Tobacco Use    Smoking status: Former Smoker     Packs/day: 0 25     Years: 30 00     Pack years: 7 50     Types: Cigarettes     Start date: 200     Quit date:      Years since quittin 7    Smokeless tobacco: Never Used   Vaping Use    Vaping Use: Never used   Substance Use Topics    Alcohol use: Yes     Comment: socially    Drug use: Never       Review of Systems   Constitutional: Negative for chills and fever  HENT: Negative for congestion, ear pain and sore throat  Eyes: Negative for pain and visual disturbance  Respiratory: Negative for cough, shortness of breath and wheezing      Cardiovascular: Negative for chest pain and leg swelling  Gastrointestinal: Negative for abdominal pain, diarrhea, nausea and vomiting  Genitourinary: Negative for dysuria, frequency, hematuria and urgency  Musculoskeletal: Negative for neck pain and neck stiffness  Skin: Positive for wound  Negative for rash  Neurological: Negative for weakness, numbness and headaches  Psychiatric/Behavioral: Negative for agitation and confusion  All other systems reviewed and are negative  Physical Exam  Physical Exam  Vitals and nursing note reviewed  Constitutional:       Appearance: He is well-developed  HENT:      Head: Normocephalic and atraumatic  Eyes:      Pupils: Pupils are equal, round, and reactive to light  Cardiovascular:      Rate and Rhythm: Normal rate and regular rhythm  Pulmonary:      Effort: Pulmonary effort is normal       Breath sounds: Normal breath sounds  Abdominal:      General: Bowel sounds are normal       Palpations: Abdomen is soft  Musculoskeletal:         General: Normal range of motion  Cervical back: Normal range of motion and neck supple  Skin:     General: Skin is warm and dry  Comments: Wound to the left knee- well healing  No erythema, drainage, or swelling to the site  Neurological:      General: No focal deficit present  Mental Status: He is alert and oriented to person, place, and time        Comments: No focal deficits         Vital Signs  ED Triage Vitals [09/19/22 2215]   Temperature Pulse Respirations Blood Pressure SpO2   (!) 97 2 °F (36 2 °C) 87 17 128/68 93 %      Temp Source Heart Rate Source Patient Position - Orthostatic VS BP Location FiO2 (%)   Temporal Monitor Sitting Left arm --      Pain Score       No Pain           Vitals:    09/19/22 2215 09/19/22 2216   BP: 128/68 128/68   Pulse: 87 88   Patient Position - Orthostatic VS: Sitting Sitting         Visual Acuity      ED Medications  Medications   rabies vaccine, human diploid (IMOVAX RABIES) IM injection 1 mL (1 mL Intramuscular Given 9/19/22 2229)       Diagnostic Studies  Results Reviewed     None                 No orders to display              Procedures  Procedures         ED Course                               SBIRT 22yo+    Flowsheet Row Most Recent Value   SBIRT (25 yo +)    In order to provide better care to our patients, we are screening all of our patients for alcohol and drug use  Would it be okay to ask you these screening questions? No Filed at: 09/19/2022 2219                    MDM  Number of Diagnoses or Management Options  Need for immunization against rabies: new and requires workup  Diagnosis management comments: Patient here for rabies vaccine  Will administer and d/c home  Discharge instructions given including follow-up, and return precautions  Patient demonstrates verbal understanding and agrees with plan         Amount and/or Complexity of Data Reviewed  Clinical lab tests: ordered and reviewed  Tests in the radiology section of CPT®: ordered and reviewed  Tests in the medicine section of CPT®: ordered and reviewed  Discussion of test results with the performing providers: yes  Decide to obtain previous medical records or to obtain history from someone other than the patient: yes  Obtain history from someone other than the patient: yes  Review and summarize past medical records: yes  Discuss the patient with other providers: yes  Independent visualization of images, tracings, or specimens: yes    Patient Progress  Patient progress: improved      Disposition  Final diagnoses:   Need for immunization against rabies     Time reflects when diagnosis was documented in both MDM as applicable and the Disposition within this note     Time User Action Codes Description Comment    9/19/2022 10:23 PM Fabiola CORBETT Add [Z23] Need for immunization against rabies       ED Disposition     ED Disposition   Discharge    Condition   Stable    Date/Time   Mon Sep 19, 2022 10:23 PM Comment     Manuel Resendiz discharge to home/self care                 Follow-up Information     Follow up With Specialties Details Why Contact Info Additional [address] [facility] Emergency Department Emergency Medicine Go to  In 1 week as scheduled for final rabies vaccination [address] [facility] [place] [id number] [facility] Emergency Department, [address] [facility], [place], [place], [address]          Discharge Medication List as of 9/19/2022 10:24 PM      CONTINUE these medications which have NOT CHANGED    Details   albuterol (2 5 mg/3 mL) 0 083 % nebulizer solution Take 3 mL (2 5 mg total) by nebulization every 6 (six) hours as needed for wheezing or shortness of breath, Starting u 9/16/2021, Print      albuterol (PROVENTIL HFA,VENTOLIN HFA) 90 mcg/act inhaler Inhale 2 puffs every 6 (six) hours as needed for wheezing or shortness of breath, Starting Fri 8/26/2022, Normal      busPIRone (BUSPAR) 5 mg tablet Take 1 tablet (5 mg total) by mouth 3 (three) times a day, Starting Wed 3/3/2021, Normal      fluticasone (FLONASE) 50 mcg/act nasal spray SPRAY 1 SPRAY INTO EACH NOSTRIL EVERY DAY, Normal      fluticasone-umeclidinium-vilanterol (Trelegy Ellipta) 200-62 5-25 MCG/INH AEPB inhaler Inhale 1 puff daily Rinse mouth after use , Starting Fri 8/26/2022, Until Fri 10/21/2022, Normal      ibuprofen (MOTRIN) 800 mg tablet Take 1 tablet (800 mg total) by mouth every 8 (eight) hours as needed for mild pain With 2 tylenol , Starting Mon 11/29/2021, Normal      metFORMIN (GLUCOPHAGE) 500 mg tablet Take 1 tablet (500 mg total) by mouth 2 (two) times a day with meals, Starting Tue 8/30/2022, Normal      mupirocin (BACTROBAN) 2 % ointment Apply topically 3 (three) times a day, Starting Wed 7/28/2021, Normal      predniSONE 20 mg tablet Take 1 tablet (20 mg total) by mouth daily, Starting Wed 3/3/2021, Normal      rosuvastatin (CRESTOR) 5 mg tablet TAKE 1 TABLET BY MOUTH  DAILY, Normal      !! Semaglutide (Rybelsus) 14 MG TABS Take 14 mg by mouth daily After taking 7mg for 1 month, Starting Tue 8/30/2022, Normal      !! Semaglutide 3 MG TABS Take 3 mg by mouth in the morning, Starting Tue 6/21/2022, Normal      !! Semaglutide 7 MG TABS Take 7 mg by mouth in the morning After taking 3mg daily for one month, Starting Tue 6/21/2022, Normal      sildenafil (VIAGRA) 50 MG tablet Take 1 tablet (50 mg total) by mouth daily as needed for erectile dysfunction, Starting Mon 3/23/2020, Print       !! - Potential duplicate medications found  Please discuss with provider  STOP taking these medications       amoxicillin-clavulanate (AUGMENTIN) 875-125 mg per tablet Comments:   Reason for Stopping:               No discharge procedures on file      PDMP Review     None          ED Provider  Electronically Signed by           Claudio Haynes DO  09/19/22 3365

## 2022-09-22 DIAGNOSIS — U07.1 COVID-19 VIRUS INFECTION: ICD-10-CM

## 2022-09-22 DIAGNOSIS — J45.40 MODERATE PERSISTENT ASTHMA WITHOUT COMPLICATION: Primary | ICD-10-CM

## 2022-09-23 NOTE — TELEPHONE ENCOUNTER
Patient called lvm: He said it is very important that only [address] calls him back to discuss his medical history   Please advise

## 2022-09-23 NOTE — TELEPHONE ENCOUNTER
S/w pt, he is wanting and hoping to speak with Dr Montserrat Roberts directly in regards to his case  He just started going to court and wanted to know how Dr Montserrat Roberts can word better that he contracted covid while working as a  and  during pandemic  He would like a call

## 2022-09-27 ENCOUNTER — OFFICE VISIT (OUTPATIENT)
Dept: BARIATRICS | Facility: CLINIC | Age: 52
End: 2022-09-27
Payer: COMMERCIAL

## 2022-09-27 ENCOUNTER — HOSPITAL ENCOUNTER (EMERGENCY)
Facility: HOSPITAL | Age: 52
Discharge: HOME | End: 2022-09-27
Attending: EMERGENCY MEDICINE
Payer: COMMERCIAL

## 2022-09-27 VITALS
DIASTOLIC BLOOD PRESSURE: 72 MMHG | HEART RATE: 94 BPM | WEIGHT: 315 LBS | HEIGHT: 73 IN | RESPIRATION RATE: 16 BRPM | SYSTOLIC BLOOD PRESSURE: 120 MMHG | BODY MASS INDEX: 41.75 KG/M2 | TEMPERATURE: 97.4 F

## 2022-09-27 VITALS
RESPIRATION RATE: 18 BRPM | DIASTOLIC BLOOD PRESSURE: 82 MMHG | TEMPERATURE: 98 F | SYSTOLIC BLOOD PRESSURE: 122 MMHG | OXYGEN SATURATION: 92 % | HEART RATE: 89 BPM

## 2022-09-27 DIAGNOSIS — E66.01 OBESITY, CLASS III, BMI 40-49.9 (MORBID OBESITY) (HCC): ICD-10-CM

## 2022-09-27 DIAGNOSIS — Z23 ENCOUNTER FOR REPEAT ADMINISTRATION OF RABIES VACCINATION: Primary | ICD-10-CM

## 2022-09-27 DIAGNOSIS — R73.03 PREDIABETES: Primary | ICD-10-CM

## 2022-09-27 DIAGNOSIS — Z91.89 AT RISK FOR SLEEP APNEA: ICD-10-CM

## 2022-09-27 PROBLEM — E66.813 OBESITY, CLASS III, BMI 40-49.9 (MORBID OBESITY): Status: ACTIVE | Noted: 2021-05-20

## 2022-09-27 PROCEDURE — 99214 OFFICE O/P EST MOD 30 MIN: CPT | Performed by: FAMILY MEDICINE

## 2022-09-27 PROCEDURE — 99281 EMR DPT VST MAYX REQ PHY/QHP: CPT | Performed by: PHYSICIAN ASSISTANT

## 2022-09-27 PROCEDURE — 90675 RABIES VACCINE IM: CPT | Performed by: PHYSICIAN ASSISTANT

## 2022-09-27 PROCEDURE — 90471 IMMUNIZATION ADMIN: CPT

## 2022-09-27 RX ADMIN — RABIES VIRUS STRAIN PM-1503-3M ANTIGEN (PROPIOLACTONE INACTIVATED) AND WATER 1 ML: KIT at 09:39

## 2022-09-27 NOTE — PROGRESS NOTES
Assessment/Plan:  Liya Barrios was seen today for follow-up  Diagnoses and all orders for this visit:    Prediabetes  continue Rybelsus max dose , no side ffects noted  Diet plan given   Continue Metformin  At risk for sleep apnea  -     Ambulatory referral to Sleep Medicine; Future    Obesity, Class III, BMI 40-49 9 (morbid obesity) ([facility] [address] )  -     Ambulatory referral to Sleep Medicine; Future  Nutrition prescription:  Calorie goal:1600- 1800kcal  Protein: 100g  Encourage mindful eating, portion control, motivational interview performed to help patient reach goals   Patient denies personal and family history of  pancreatitis, thyroid cancer, MEN-2 tumors  Total time spent: 30 minutes with >50%  face-to-face time spent counseling patient on nonsurgical interventions for the treatment of excess weight  Discussed the role of weight loss medications  Counseled patient on diet behavior and exercise modification for weight loss  Follow up in approximately 6 weeks      Subjective:   Chief Complaint   Patient presents with    Follow-up     Patient here to discuss weight associated problems and nutrition goals  HPI: Cristiane Villalobos  is a 46 y o  male with excess weight/obesity here to pursue weight management  Patient is pursuing [facility] Program    Most recent notes and records were reviewed    Initial weight loss goal of 5-10% weight loss for improved health  Wt Readings from Last 10 Encounters:   09/27/22 (!) 171 kg (376 lb 9 6 oz)   09/14/22 (!) 173 kg (381 lb)   08/26/22 (!) 173 kg (381 lb)   06/22/22 (!) 173 kg (381 lb)   06/21/22 (!) 175 kg (386 lb 12 8 oz)   03/29/22 (!) 173 kg (381 lb)   12/16/21 (!) 162 kg (358 lb)   09/16/21 (!) 166 kg (367 lb)   08/04/21 (!) 158 kg (348 lb)   07/28/21 (!) 158 kg (348 lb)        Rybelsus started 4/2021 but he was unable to f/u after 5/2021  Went down to 347 lbs on rybelsus but could not afford after being put on [address]  Was eating cheap food that was affordable  Would like to go back on Rybelsus to help with weight loss   Last OV : 6/21/22 weight 386lbs  Current weight : 376lbs lost 10lbs  Food logging: starting  Increased appetite/cravings:  B:protein shake premier  L: salad meat   D:salad meat   Snacks: fruits, banana, apples   Exercise:mobility is improving now that he can breathe  Hydration:1 5 gallon per day water  Alcohol: no          The following portions of the patient's history were reviewed and updated as appropriate: allergies, current medications, past family history, past medical history, past social history, past surgical history, and problem list       Review of Systems   Constitutional: Negative for activity change  Fatigue  HENT: Negative for trouble swallowing  Respiratory: + for shortness of breath  Cardiovascular: Negative for chest pain, edema  Gastrointestinal: Negative for abdominal pain, nausea and vomiting, acid reflux, constipation/diarrhea  Psychiatric/Behavioral: Negative for behavioral problems , anxiety or depression    Objective:  /72 (BP Location: Left arm, Patient Position: Sitting, Cuff Size: Large)   Pulse 94   Temp (!) 97 4 °F (36 3 °C) (Tympanic)   Resp 16   Ht 6' 1 1" (1 857 m)   Wt (!) 171 kg (376 lb 9 6 oz) Comment: patient refused to take off shoes  BMI 49 55 kg/m²   Constitutional: Well-developed, well-nourished and Obese Body mass index is 49 55 kg/m²  Onelia Jurist HEENT: No conjunctival injection  No thyroid masses  Pulmonary: No increased work of breathing or signs of respiratory distress  Clear respiratory sounds  CV: Well-perfused, Regular rate and rhythm, no murmurs, no edema  GI: increased abdominal girth  Non-distended  Not tender   Endo: no ophthalmopathy, no dermopathy, truncal obesity  MSK: no sarcopenia  Neuro: Oriented to person, place and time  Normal Speech  Normal gait  Psych: Normal affect and mood   No delusion or hallucinations, normal thought process

## 2022-09-27 NOTE — ED PROVIDER NOTES
History  Chief Complaint   Patient presents with    Follow Up Rabies     4th rabies vaccine no complaints     47yo male presenting for his last rabies vaccination  The vaccine series was started on 9/11/22 after being bitten by an unvaccinated dog in the right arm  Bite wounds are most healed  He tolerated previous vaccines without issue  Patient denies other acute complaints  History provided by:  Patient   used: No        Prior to Admission Medications   Prescriptions Last Dose Informant Patient Reported? Taking? Semaglutide (Rybelsus) 14 MG TABS   No No   Sig: Take 14 mg by mouth daily After taking 7mg for 1 month   albuterol (2 5 mg/3 mL) 0 083 % nebulizer solution  Self No No   Sig: Take 3 mL (2 5 mg total) by nebulization every 6 (six) hours as needed for wheezing or shortness of breath   albuterol (PROVENTIL HFA,VENTOLIN HFA) 90 mcg/act inhaler   No No   Sig: Inhale 2 puffs every 6 (six) hours as needed for wheezing or shortness of breath   fluticasone (FLONASE) 50 mcg/act nasal spray  Self No No   Sig: SPRAY 1 SPRAY INTO EACH NOSTRIL EVERY DAY   fluticasone-umeclidinium-vilanterol (Trelegy Ellipta) 200-62 5-25 MCG/INH AEPB inhaler   No No   Sig: Inhale 1 puff daily Rinse mouth after use     metFORMIN (GLUCOPHAGE) 500 mg tablet   No No   Sig: Take 1 tablet (500 mg total) by mouth 2 (two) times a day with meals   mupirocin (BACTROBAN) 2 % ointment  Self No No   Sig: Apply topically 3 (three) times a day   Patient not taking: Reported on 9/27/2022   rosuvastatin (CRESTOR) 5 mg tablet   No No   Sig: TAKE 1 TABLET BY MOUTH  DAILY   sildenafil (VIAGRA) 50 MG tablet  Self No No   Sig: Take 1 tablet (50 mg total) by mouth daily as needed for erectile dysfunction      Facility-Administered Medications: None       Past Medical History:   Diagnosis Date    Cough     Cough     Cough     High cholesterol     Obesity, Class III, BMI 40-49 9 (morbid obesity) (HCC)     SOB (shortness of breath)     SOB (shortness of breath)     SOB (shortness of breath)     SOB (shortness of breath)     SOB (shortness of breath)     Wheezing     Wheezing     Wheezing        Past Surgical History:   Procedure Laterality Date    APPENDECTOMY  26    SHOULDER SURGERY  [id number]    several       Family History   Problem Relation Age of Onset    Drug abuse Mother     Lung cancer Father      I have reviewed and agree with the history as documented  E-Cigarette/Vaping    E-Cigarette Use Never User      E-Cigarette/Vaping Substances    Nicotine No     THC No     CBD No     Flavoring No     Other No     Unknown No      Social History     Tobacco Use    Smoking status: Former Smoker     Packs/day: 0 25     Years: 30 00     Pack years: 7 50     Types: Cigarettes     Start date:      Quit date:      Years since quittin 7    Smokeless tobacco: Never Used   Vaping Use    Vaping Use: Never used   Substance Use Topics    Alcohol use: Yes     Comment: socially    Drug use: Never       Review of Systems   Constitutional: Negative for chills and fever  HENT: Negative for ear discharge and facial swelling  Eyes: Negative for discharge and redness  Skin: Positive for wound  Negative for color change  Neurological: Negative for weakness and numbness  Psychiatric/Behavioral: Negative for confusion  The patient is not nervous/anxious  All other systems reviewed and are negative  Physical Exam  Physical Exam  Vitals and nursing note reviewed  Constitutional:       General: He is not in acute distress  Appearance: Normal appearance  He is not toxic-appearing  HENT:      Head: Normocephalic and atraumatic  Right Ear: External ear normal       Left Ear: External ear normal    Eyes:      General: No scleral icterus  Right eye: No discharge  Left eye: No discharge        Conjunctiva/sclera: Conjunctivae normal    Cardiovascular:      Rate and Rhythm: Normal rate    Pulmonary:      Effort: Pulmonary effort is normal  No respiratory distress  Breath sounds: No stridor  Musculoskeletal:         General: No deformity  Normal range of motion  Cervical back: Normal range of motion  Skin:     General: Skin is warm and dry  Comments: No signs of infection on right leg   Neurological:      General: No focal deficit present  Mental Status: He is alert  Mental status is at baseline  Psychiatric:         Mood and Affect: Mood normal          Behavior: Behavior normal          Vital Signs  ED Triage Vitals [09/27/22 0924]   Temperature Pulse Respirations Blood Pressure SpO2   98 °F (36 7 °C) 89 18 122/82 92 %      Temp src Heart Rate Source Patient Position - Orthostatic VS BP Location FiO2 (%)   -- Monitor Sitting Left arm --      Pain Score       --           Vitals:    09/27/22 0924   BP: 122/82   Pulse: 89   Patient Position - Orthostatic VS: Sitting         Visual Acuity      ED Medications  Medications   rabies vaccine, human diploid (IMOVAX RABIES) IM injection 1 mL (1 mL Intramuscular Given 9/27/22 0958)       Diagnostic Studies  Results Reviewed     None                 No orders to display              Procedures  Procedures         ED Course                   MDM  Number of Diagnoses or Management Options  Encounter for repeat administration of rabies vaccination: new and does not require workup  Diagnosis management comments: 47yo male here for his 4th rabies vaccine  No acute complaints  Vitals are reassuring  No signs of infection on exam  Will administer last rabies vaccine  He was discharged in stable condition          Amount and/or Complexity of Data Reviewed  Review and summarize past medical records: yes    Risk of Complications, Morbidity, and/or Mortality  Presenting problems: low  Diagnostic procedures: low  Management options: low    Patient Progress  Patient progress: stable      Disposition  Final diagnoses:   Encounter for repeat administration of rabies vaccination     Time reflects when diagnosis was documented in both MDM as applicable and the Disposition within this note     Time User Action Codes Description Comment    9/27/2022  9:24 AM Mauro Aldrich [Z23] Encounter for repeat administration of rabies vaccination       ED Disposition     ED Disposition   Discharge    Condition   Stable    Date/Time   Tue Sep 27, 2022  9:24 AM    Comment   Aida Ricks discharge to home/self care  Follow-up Information     Follow up With Specialties Details Why Contact Info Additional Information    [facility] Emergency Department Emergency Medicine  As needed [address] Emergency Department, [address] [place] [address], [address] [facility], [place], [address]          Patient's Medications   Discharge Prescriptions    No medications on file       No discharge procedures on file      PDMP Review     None          ED Provider  Electronically Signed by           Sanam New PA-C  09/27/22 7369

## 2022-09-28 ENCOUNTER — RA CDI HCC (OUTPATIENT)
Dept: OTHER | Facility: HOSPITAL | Age: 52
End: 2022-09-28

## 2022-09-28 NOTE — PROGRESS NOTES
[facility]  coding opportunities       Chart reviewed, no opportunity found: CHART REVIEWED, NO OPPORTUNITY FOUND        Patients Insurance        Commercial Insurance: Letha Slaughter

## 2022-09-29 LAB
ALBUMIN SERPL-MCNC: 4.2 G/DL (ref 3.6–5.1)
ALBUMIN/GLOB SERPL: 1.4 (CALC) (ref 1–2.5)
ALP SERPL-CCNC: 86 U/L (ref 35–144)
ALT SERPL-CCNC: 18 U/L (ref 9–46)
AST SERPL-CCNC: 12 U/L (ref 10–35)
BILIRUB SERPL-MCNC: 0.7 MG/DL (ref 0.2–1.2)
BUN SERPL-MCNC: 14 MG/DL (ref 7–25)
BUN/CREAT SERPL: NORMAL (CALC) (ref 6–22)
CALCIUM SERPL-MCNC: 9.6 MG/DL (ref 8.6–10.3)
CHLORIDE SERPL-SCNC: 102 MMOL/L (ref 98–110)
CHOLEST SERPL-MCNC: 163 MG/DL
CHOLEST/HDLC SERPL: 5.1 (CALC)
CO2 SERPL-SCNC: 29 MMOL/L (ref 20–32)
CREAT SERPL-MCNC: 1.17 MG/DL (ref 0.7–1.3)
GFR/BSA.PRED SERPLBLD CYS-BASED-ARV: 75 ML/MIN/1.73M2
GLOBULIN SER CALC-MCNC: 2.9 G/DL (CALC) (ref 1.9–3.7)
GLUCOSE SERPL-MCNC: 95 MG/DL (ref 65–99)
HBA1C MFR BLD: 5.3 % OF TOTAL HGB
HDLC SERPL-MCNC: 32 MG/DL
LDLC SERPL CALC-MCNC: 105 MG/DL (CALC)
NONHDLC SERPL-MCNC: 131 MG/DL (CALC)
POTASSIUM SERPL-SCNC: 5 MMOL/L (ref 3.5–5.3)
PROT SERPL-MCNC: 7.1 G/DL (ref 6.1–8.1)
SARS-COV-2 IGG SERPL IA-ACNC: <1 INDEX
SODIUM SERPL-SCNC: 139 MMOL/L (ref 135–146)
TRIGL SERPL-MCNC: 146 MG/DL
TSH SERPL-ACNC: 2.1 MIU/L (ref 0.4–4.5)

## 2022-10-05 ENCOUNTER — TELEPHONE (OUTPATIENT)
Dept: PSYCHIATRY | Facility: CLINIC | Age: 52
End: 2022-10-05

## 2022-10-05 ENCOUNTER — OFFICE VISIT (OUTPATIENT)
Dept: FAMILY MEDICINE CLINIC | Facility: CLINIC | Age: 52
End: 2022-10-05
Payer: COMMERCIAL

## 2022-10-05 VITALS
SYSTOLIC BLOOD PRESSURE: 120 MMHG | WEIGHT: 315 LBS | OXYGEN SATURATION: 93 % | HEART RATE: 90 BPM | DIASTOLIC BLOOD PRESSURE: 82 MMHG | HEIGHT: 73 IN | BODY MASS INDEX: 41.75 KG/M2

## 2022-10-05 DIAGNOSIS — E78.5 HYPERLIPIDEMIA, UNSPECIFIED HYPERLIPIDEMIA TYPE: ICD-10-CM

## 2022-10-05 DIAGNOSIS — F41.9 ANXIETY: Primary | ICD-10-CM

## 2022-10-05 DIAGNOSIS — K21.9 GASTROESOPHAGEAL REFLUX DISEASE WITHOUT ESOPHAGITIS: ICD-10-CM

## 2022-10-05 DIAGNOSIS — E66.01 CLASS 3 SEVERE OBESITY DUE TO EXCESS CALORIES WITHOUT SERIOUS COMORBIDITY WITH BODY MASS INDEX (BMI) OF 45.0 TO 49.9 IN ADULT (HCC): ICD-10-CM

## 2022-10-05 PROCEDURE — 99214 OFFICE O/P EST MOD 30 MIN: CPT | Performed by: FAMILY MEDICINE

## 2022-10-05 NOTE — PROGRESS NOTES
Assessment/Plan:     Chronic Problems:  No problem-specific Assessment & Plan notes found for this encounter  Visit Diagnosis:  Diagnoses and all orders for this visit:    Anxiety  Comments:  discussed past issues and concerns, recommend encouraged counseling/therapist, information given providers patient to self schedule  Orders:  -     Ambulatory Referral to [facility]; Future    Hyperlipidemia, unspecified hyperlipidemia type  Comments:  Stable, improved lipid profile tolerating statin to be continued    Class 3 severe obesity due to excess calories without serious comorbidity with body mass index (BMI) of 45 0 to 49 9 in adult [facility])  Comments:  Encouraged to continue with current program weight management    Gastroesophageal reflux disease without esophagitis  Comments:  Discussed dietary modifications improved choices, avoidance encouraged of triggers, OTC options          Subjective:    Patient ID: Collin Castillo is a 46 y o  male      F/u   Continues to be followed by weight management , nutrition marcia fuentes   Making dietary changes , ryblesus /etformin continues positive in regard to moving forward , since start 11 lbs   Pulmonary , inhalers , continued   Chol , crestor  gerd on occasion , in past had been taking ppi , with relief , using otc   Has been experiencing over the course of the past months reoccurring thoughts and memories of past experiences of post [facility] and more recently covid patients 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  Denies any thoughts of self harm , and no depression , just feels its time to have a discussion with someone into past experiences, has spoken to former colleagues and it has become a common topics of discussion as a group , has begun journals and finding benefit ,           The following portions of the patient's history were reviewed and updated as appropriate: allergies, current medications, past family history, past medical history, past social history, past surgical history and problem list     Review of Systems   Constitutional: Negative for appetite change, chills, fever and unexpected weight change  HENT: Negative for congestion, dental problem, ear pain, hearing loss, postnasal drip, rhinorrhea, sinus pressure, sinus pain, sneezing, sore throat, tinnitus and voice change  Eyes: Negative for visual disturbance  Respiratory: Negative for apnea, cough, chest tightness and shortness of breath  Cardiovascular: Negative for chest pain, palpitations and leg swelling  Gastrointestinal: Negative for abdominal pain, blood in stool, constipation, diarrhea, nausea and vomiting  Endocrine: Negative for cold intolerance, heat intolerance, polydipsia, polyphagia and polyuria  Genitourinary: Negative for decreased urine volume, difficulty urinating, dysuria, frequency and hematuria  Musculoskeletal: Negative for arthralgias, back pain, gait problem, joint swelling and myalgias  Skin: Negative for color change, rash and wound  Allergic/Immunologic: Negative for environmental allergies and food allergies  Neurological: Negative for dizziness, syncope, weakness, light-headedness, numbness and headaches  Hematological: Negative for adenopathy  Does not bruise/bleed easily  Psychiatric/Behavioral: Negative for sleep disturbance and suicidal ideas  The patient is not nervous/anxious            /82   Pulse 90   Ht 6' 1" (1 854 m)   Wt (!) 168 kg (371 lb) Comment: pt reported  SpO2 93%   BMI 48 95 kg/m²   Social History     Socioeconomic History    Marital status: /Civil Union     Spouse name: Not on file    Number of children: Not on file    Years of education: Not on file    Highest education level: Not on file   Occupational History    Occupation: employed   Tobacco Use    Smoking status: Former Smoker     Packs/day: 0 25     Years: 30 00     Pack years: 7 50     Types: Cigarettes     Start date: 200     Quit date: 2020     Years since quittin 7    Smokeless tobacco: Never Used   Vaping Use    Vaping Use: Never used   Substance and Sexual Activity    Alcohol use: Yes     Comment: socially    Drug use: Never    Sexual activity: Yes   Other Topics Concern    Not on file   Social History Narrative    Not on file     Social Determinants of Health     Financial Resource Strain: Not on file   Food Insecurity: Not on file   Transportation Needs: Not on file   Physical Activity: Not on file   Stress: Not on file   Social Connections: Not on file   Intimate Partner Violence: Not on file   Housing Stability: Not on file     Past Medical History:   Diagnosis Date    Cough     Cough     Cough     High cholesterol     Obesity, Class III, BMI 40-49 9 (morbid obesity) ([facility] [address] )     SOB (shortness of breath)     SOB (shortness of breath)     SOB (shortness of breath)     SOB (shortness of breath)     SOB (shortness of breath)     Wheezing     Wheezing     Wheezing      Family History   Problem Relation Age of Onset    Drug abuse Mother     Lung cancer Father      Past Surgical History:   Procedure Laterality Date    APPENDECTOMY  26    SHOULDER SURGERY  [id number]    several       Current Outpatient Medications:     albuterol (2 5 mg/3 mL) 0 083 % nebulizer solution, Take 3 mL (2 5 mg total) by nebulization every 6 (six) hours as needed for wheezing or shortness of breath, Disp: 120 mL, Rfl: 0    albuterol (PROVENTIL HFA,VENTOLIN HFA) 90 mcg/act inhaler, Inhale 2 puffs every 6 (six) hours as needed for wheezing or shortness of breath, Disp: 1 g, Rfl: 2    fluticasone (FLONASE) 50 mcg/act nasal spray, SPRAY 1 SPRAY INTO EACH NOSTRIL EVERY DAY, Disp: 16 mL, Rfl: 3    fluticasone-umeclidinium-vilanterol (Trelegy Ellipta) 200-62 5-25 MCG/INH AEPB inhaler, Inhale 1 puff daily Rinse mouth after use , Disp: 180 blister, Rfl: 1    metFORMIN (GLUCOPHAGE) 500 mg tablet, Take 1 tablet (500 mg total) by mouth 2 (two) times a day with meals, Disp: 180 tablet, Rfl: 0    rosuvastatin (CRESTOR) 5 mg tablet, TAKE 1 TABLET BY MOUTH  DAILY, Disp: 90 tablet, Rfl: 0    Semaglutide (Rybelsus) 14 MG TABS, Take 14 mg by mouth daily After taking 7mg for 1 month, Disp: 90 tablet, Rfl: 0    sildenafil (VIAGRA) 50 MG tablet, Take 1 tablet (50 mg total) by mouth daily as needed for erectile dysfunction, Disp: 10 tablet, Rfl: 0    mupirocin (BACTROBAN) 2 % ointment, Apply topically 3 (three) times a day (Patient not taking: No sig reported), Disp: 22 g, Rfl: 0    No Known Allergies       Lab Review   Orders Only on 09/28/2022   Component Date Value    Total Cholesterol 09/28/2022 163     HDL 09/28/2022 32 (A)    Triglycerides 09/28/2022 146     LDL Calculated 09/28/2022 105 (A)    Chol HDLC Ratio 09/28/2022 5 1 (A)    Non-HDL Cholesterol 09/28/2022 131 (A)    Glucose, Random 09/28/2022 95     BUN 09/28/2022 14     Creatinine 09/28/2022 1 17     eGFR 09/28/2022 75     SL AMB BUN/CREATININE RA* 38/68/2567 NOT APPLICABLE     Sodium 34/92/9628 139     Potassium 09/28/2022 5 0     Chloride 09/28/2022 102     CO2 09/28/2022 29     Calcium 09/28/2022 9 6     Protein, Total 09/28/2022 7 1     Albumin 09/28/2022 4 2     Globulin 09/28/2022 2 9     Albumin/Globulin Ratio 09/28/2022 1 4     TOTAL BILIRUBIN 09/28/2022 0 7     Alkaline Phosphatase 09/28/2022 86     AST 09/28/2022 12     ALT 09/28/2022 18     SARS COV 2 Ab (IgG) Spik* 09/28/2022 <1 00     TSH 09/28/2022 2 10     Hemoglobin A1C 09/28/2022 5 3         Imaging: No results found  Objective:     Physical Exam  Constitutional:       General: He is not in acute distress  Appearance: He is obese  He is not ill-appearing or toxic-appearing  HENT:      Head: Normocephalic and atraumatic  Cardiovascular:      Rate and Rhythm: Normal rate     Pulmonary:      Effort: Pulmonary effort is normal    Neurological:      Mental Status: He is oriented to person, place, and time  Psychiatric:         Attention and Perception: Attention and perception normal          Mood and Affect: Mood and affect normal          Speech: Speech normal          Behavior: Behavior normal  Behavior is cooperative  Thought Content: Thought content normal  Thought content is not paranoid or delusional  Thought content does not include homicidal or suicidal ideation  Thought content does not include homicidal or suicidal plan  Cognition and Memory: Cognition and memory normal          Judgment: Judgment normal  Judgment is not impulsive  There are no Patient Instructions on file for this visit  [facility]    Portions of the record may have been created with voice recognition software  Occasional wrong word or "sound a like" substitutions may have occurred due to the inherent limitations of voice recognition software  Read the chart carefully and recognize, using context, where substitutions have occurred

## 2022-10-28 DIAGNOSIS — R73.03 PREDIABETES: ICD-10-CM

## 2022-10-28 DIAGNOSIS — E66.01 CLASS 3 SEVERE OBESITY DUE TO EXCESS CALORIES WITHOUT SERIOUS COMORBIDITY WITH BODY MASS INDEX (BMI) OF 45.0 TO 49.9 IN ADULT (HCC): ICD-10-CM

## 2022-10-28 DIAGNOSIS — E88.81 INSULIN RESISTANCE: ICD-10-CM

## 2022-10-31 RX ORDER — ORAL SEMAGLUTIDE 14 MG/1
TABLET ORAL
Qty: 90 TABLET | Refills: 0 | Status: SHIPPED | OUTPATIENT
Start: 2022-10-31

## 2022-11-01 DIAGNOSIS — E78.5 HYPERLIPIDEMIA, UNSPECIFIED HYPERLIPIDEMIA TYPE: ICD-10-CM

## 2022-11-02 RX ORDER — ROSUVASTATIN CALCIUM 5 MG/1
5 TABLET, COATED ORAL DAILY
Qty: 90 TABLET | Refills: 0 | Status: SHIPPED | OUTPATIENT
Start: 2022-11-02

## 2022-11-14 ENCOUNTER — OFFICE VISIT (OUTPATIENT)
Dept: PULMONOLOGY | Facility: CLINIC | Age: 52
End: 2022-11-14

## 2022-11-14 VITALS
HEART RATE: 98 BPM | HEIGHT: 73 IN | SYSTOLIC BLOOD PRESSURE: 124 MMHG | TEMPERATURE: 98 F | WEIGHT: 315 LBS | DIASTOLIC BLOOD PRESSURE: 84 MMHG | BODY MASS INDEX: 41.75 KG/M2 | OXYGEN SATURATION: 92 %

## 2022-11-14 DIAGNOSIS — J45.40 MODERATE PERSISTENT ASTHMA WITHOUT COMPLICATION: Primary | ICD-10-CM

## 2022-11-14 DIAGNOSIS — E66.01 MORBID OBESITY (HCC): ICD-10-CM

## 2022-11-14 DIAGNOSIS — U07.1 COVID-19 VIRUS INFECTION: ICD-10-CM

## 2022-11-14 DIAGNOSIS — R06.02 SOB (SHORTNESS OF BREATH) ON EXERTION: ICD-10-CM

## 2022-11-14 NOTE — PROGRESS NOTES
Assessment/Plan:   Diagnoses and all orders for this visit:    Moderate persistent asthma without complication    [id number] virus infection    SOB (shortness of breath) on exertion    Morbid obesity (HCC)      shortness of breath and dyspnea on exertion with reactive airway disease moderate persistent asthma without complication not in any exacerbation currently   PFTs with FEV1 of 51% with response to the bronchodilator in the small airways 6 minute walk test did not need any supplemental oxygen   Discussed with the patient to continue with Trelegy 1 puff daily  contiue with albuterol via nebulizer four times daily as needed  Rinse mouth after use  Recommend weight loss  Follow up in 6 months or prn earlier as needed  Return in about 6 months (around 5/14/2023)  All questions are answered to the patient's satisfaction and understanding  He verbalizes understanding  He is encouraged to call with any further questions or concerns  Portions of the record may have been created with voice recognition software  Occasional wrong word or "sound a like" substitutions may have occurred due to the inherent limitations of voice recognition software  Read the chart carefully and recognize, using context, where substitutions have occurred  Electronically Signed by María Concepcion MD    ______________________________________________________________________    Chief Complaint:   Chief Complaint   Patient presents with   • Follow-up       Patient ID: Alanna Zepeda is a 46 y o  y o  male has a past medical history of Cough, Cough, Cough, High cholesterol, Obesity, Class III, BMI 40-49 9 (morbid obesity) ([address] ), SOB (shortness of breath), SOB (shortness of breath), SOB (shortness of breath), SOB (shortness of breath), SOB (shortness of breath), Wheezing, Wheezing, and Wheezing  11/14/2022  Patient presents today for follow-up visit    Alanna Zepeda is a very pleasant 20-year-old gentleman, former smoker with less than 10 pack-year smoking history who quit a year ago, who is a  and 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, and a  states he has been doing 2 jobs for several years, until he contracted COVID-23 infection in December of 2020 states he was not hospitalized he could manage it at home with supportive treatment as well as the steroids he states, he was also given an inhaler given he was having shortness of breath and wheezing he states he has been having asthma in the past with allergies especially hay fever and and when his wheezing was bad after the COVID-19 infection he was given albuterol inhaler which he has been using once or twice a day he states  He is continuing to use the Trelegy 1 puff once a day and using the albuterol via nebulizer 3 times daily and he states he has been feeling better than before walking slightly longer distances than  before  Although he is not back to his baseline  Review of Systems   Constitutional: Negative  HENT: Negative  Eyes: Negative  Respiratory: Positive for cough, shortness of breath and wheezing  Cardiovascular: Negative  Gastrointestinal: Negative  Endocrine: Negative  Genitourinary: Negative  Musculoskeletal: Negative  Allergic/Immunologic: Negative  Neurological: Negative  Hematological: Negative  Psychiatric/Behavioral: Negative  Smoking history: He reports that he quit smoking about 2 years ago  His smoking use included cigarettes  He started smoking about 32 years ago  He has a 7 50 pack-year smoking history   He has never used smokeless tobacco     The following portions of the patient's history were reviewed and updated as appropriate: allergies, current medications, past family history, past medical history, past social history, past surgical history and problem list     Immunization History   Administered Date(s) Administered   • Rabies Immune Globulin 09/11/2022   • Rabies-IM Human Diploid Cell Culture 09/11/2022, 09/14/2022, 09/19/2022, 09/27/2022   • Tdap 09/11/2022     Current Outpatient Medications   Medication Sig Dispense Refill   • albuterol (2 5 mg/3 mL) 0 083 % nebulizer solution Take 3 mL (2 5 mg total) by nebulization every 6 (six) hours as needed for wheezing or shortness of breath 120 mL 0   • albuterol (PROVENTIL HFA,VENTOLIN HFA) 90 mcg/act inhaler Inhale 2 puffs every 6 (six) hours as needed for wheezing or shortness of breath 1 g 2   • fluticasone (FLONASE) 50 mcg/act nasal spray SPRAY 1 SPRAY INTO EACH NOSTRIL EVERY DAY 16 mL 3   • fluticasone-umeclidinium-vilanterol (Trelegy Ellipta) 200-62 5-25 MCG/INH AEPB inhaler Inhale 1 puff daily Rinse mouth after use  180 blister 1   • metFORMIN (GLUCOPHAGE) 500 mg tablet TAKE 1 TABLET BY MOUTH  TWICE DAILY WITH MEALS 180 tablet 0   • rosuvastatin (CRESTOR) 5 mg tablet Take 1 tablet (5 mg total) by mouth daily 90 tablet 0   • Rybelsus 14 MG TABS TAKE 1 TABLET BY MOUTH  DAILY AFTER TAKING 7MG FOR  1 MONTH 90 tablet 0   • sildenafil (VIAGRA) 50 MG tablet Take 1 tablet (50 mg total) by mouth daily as needed for erectile dysfunction 10 tablet 0   • mupirocin (BACTROBAN) 2 % ointment Apply topically 3 (three) times a day (Patient not taking: No sig reported) 22 g 0     No current facility-administered medications for this visit  Allergies: Patient has no known allergies  Objective:  Vitals:    11/14/22 0934   BP: 124/84   Pulse: 98   Temp: 98 °F (36 7 °C)   SpO2: 92%   Weight: (!) 163 kg (360 lb)   Height: 6' 1" (1 854 m)   Oxygen Therapy  SpO2: 92 %    Wt Readings from Last 3 Encounters:   11/14/22 (!) 163 kg (360 lb)   10/05/22 (!) 168 kg (371 lb)   09/27/22 (!) 171 kg (376 lb 9 6 oz)     Body mass index is 47 5 kg/m²  Physical Exam  Constitutional:       Appearance: He is well-developed and well-nourished  HENT:      Head: Normocephalic and atraumatic          Comments: Crowded oropharyngeal airways, Mallampati scoreEyes:      Extraocular Movements: EOM normal       Pupils: Pupils are equal, round, and reactive to light  Neck:      Comments: Short and wide neck  Cardiovascular:      Rate and Rhythm: Normal rate and regular rhythm  Heart sounds: Normal heart sounds  Pulmonary:      Effort: Pulmonary effort is normal       Breath sounds: Normal breath sounds  Musculoskeletal:         General: Normal range of motion  Cervical back: Normal range of motion and neck supple  Skin:     General: Skin is warm and dry  Neurological:      Mental Status: He is alert and oriented to person, place, and time  Psychiatric:         Mood and Affect: Mood and affect normal          Behavior: Behavior normal            Diagnostics:  I have personally reviewed pertinent reports

## 2022-11-15 ENCOUNTER — TELEPHONE (OUTPATIENT)
Dept: PULMONOLOGY | Facility: CLINIC | Age: 52
End: 2022-11-15

## 2022-11-15 NOTE — TELEPHONE ENCOUNTER
Pt called stating he was in the office to see Dr Santos Look yesterday and would like to know if the paperwork has been filled out yet because he hasn't received a call from us    Please advise: [phone]

## 2022-12-01 ENCOUNTER — TELEPHONE (OUTPATIENT)
Dept: PULMONOLOGY | Facility: CLINIC | Age: 52
End: 2022-12-01

## 2022-12-01 DIAGNOSIS — R06.02 SOB (SHORTNESS OF BREATH): Primary | ICD-10-CM

## 2022-12-01 RX ORDER — PREDNISONE 20 MG/1
TABLET ORAL
Qty: 18 TABLET | Refills: 0 | Status: SHIPPED | OUTPATIENT
Start: 2022-12-01 | End: 2023-01-26 | Stop reason: ALTCHOICE

## 2022-12-01 NOTE — TELEPHONE ENCOUNTER
Patient calling stating at his last appt he thought Ayad Court wanted to put him on another round of steroids but none were ever sent in  He is looking for confirmation   Please advise

## 2022-12-01 NOTE — TELEPHONE ENCOUNTER
Spoke with the patient and sent in the prednisone tapering down dose he will continue with his Trelegy 1 puff daily along with the albuterol via nebulizer 4 times daily any worsening symptoms he will give the office a call go into the ER    Understands and verbalizes

## 2022-12-05 ENCOUNTER — TELEPHONE (OUTPATIENT)
Dept: PULMONOLOGY | Facility: CLINIC | Age: 52
End: 2022-12-05

## 2022-12-05 NOTE — TELEPHONE ENCOUNTER
Left message for pt in reference to paperwork ready for  at  that he had requested by completed by provider

## 2022-12-19 ENCOUNTER — OFFICE VISIT (OUTPATIENT)
Dept: BARIATRICS | Facility: CLINIC | Age: 52
End: 2022-12-19

## 2022-12-19 VITALS
WEIGHT: 315 LBS | HEART RATE: 95 BPM | RESPIRATION RATE: 16 BRPM | BODY MASS INDEX: 41.75 KG/M2 | DIASTOLIC BLOOD PRESSURE: 78 MMHG | HEIGHT: 73 IN | SYSTOLIC BLOOD PRESSURE: 124 MMHG

## 2022-12-19 DIAGNOSIS — E66.01 OBESITY, CLASS III, BMI 40-49.9 (MORBID OBESITY) (HCC): ICD-10-CM

## 2022-12-19 DIAGNOSIS — R73.03 PREDIABETES: Primary | ICD-10-CM

## 2022-12-19 RX ORDER — SODIUM PICOSULFATE, MAGNESIUM OXIDE, AND ANHYDROUS CITRIC ACID 10; 3.5; 12 MG/160ML; G/160ML; G/160ML
LIQUID ORAL
COMMUNITY
Start: 2022-11-07

## 2022-12-19 NOTE — PROGRESS NOTES
Assessment/Plan:  Hugh Schneider was seen today for follow-up  Diagnoses and all orders for this visit:    Prediabetes  Rybelsus difficult to find swic to [facility] weight loss stalled due to long term prednisone use, needs to switch for more effective therapy   Diet plan given   Continue Metformin  At risk for sleep apnea  -     Ambulatory referral to Sleep Medicine; Future    Obesity, Class III, BMI 40-49 9 (morbid obesity) ([address] )  -     Ambulatory referral to Sleep Medicine; Future  Nutrition prescription:  Calorie goal:1600- 1800kcal  Protein: 100g  Encourage mindful eating, portion control, motivational interview performed to help patient reach goals   Patient denies personal and family history of  pancreatitis, thyroid cancer, MEN-2 tumors  Follow up in approximately 6 weeks      Subjective:   Chief Complaint   Patient presents with   • Follow-up     Patient here to discuss weight associated problems and nutrition goals  HPI: Allan Spencer  is a 46 y o  male with excess weight/obesity here to pursue weight management  Patient is pursuing [facility]    Most recent notes and records were reviewed    Initial weight loss goal of 5-10% weight loss for improved health  Wt Readings from Last 10 Encounters:   12/19/22 (!) 169 kg (373 lb)   11/14/22 (!) 163 kg (360 lb)   10/05/22 (!) 168 kg (371 lb)   09/27/22 (!) 171 kg (376 lb 9 6 oz)   09/14/22 (!) 173 kg (381 lb)   08/26/22 (!) 173 kg (381 lb)   06/22/22 (!) 173 kg (381 lb)   06/21/22 (!) 175 kg (386 lb 12 8 oz)   03/29/22 (!) 173 kg (381 lb)   12/16/21 (!) 162 kg (358 lb)        Rybelsus started 4/2021 but he was unable to f/u after 5/2021  Went down to 347 lbs on rybelsus but could not afford after being put on [address]  Was eating cheap food that was affordable  Would like to go back on Rybelsus to help with weight loss   Last OV : 6/21/22 weight 386lbs  Current weight : 376lbs lost 10lbs  Food logging: starting  Increased appetite/cravings:  B:protein shake premier  L: salad meat   D:salad meat   Snacks: fruits, banana, apples   Exercise:mobility is improving now that he can breathe  Hydration:1 5 gallon per day water  Alcohol: no          The following portions of the patient's history were reviewed and updated as appropriate: allergies, current medications, past family history, past medical history, past social history, past surgical history, and problem list       Review of Systems   Constitutional: Negative for activity change  Fatigue  HENT: Negative for trouble swallowing  Respiratory: + for shortness of breath  Cardiovascular: Negative for chest pain, edema  Gastrointestinal: Negative for abdominal pain, nausea and vomiting, acid reflux, constipation/diarrhea  Psychiatric/Behavioral: Negative for behavioral problems , anxiety or depression    Objective:  /78 (BP Location: Left arm, Patient Position: Sitting, Cuff Size: Large)   Pulse 95   Resp 16   Ht 6' 1 1" (1 857 m)   Wt (!) 169 kg (373 lb)   BMI 49 08 kg/m²   Constitutional: Well-developed, well-nourished and Obese Body mass index is 49 08 kg/m²  Wandalee Highman HEENT: No conjunctival injection  No thyroid masses  Pulmonary: No increased work of breathing or signs of respiratory distress  Clear respiratory sounds  CV: Well-perfused, Regular rate and rhythm, no murmurs, no edema  GI: increased abdominal girth  Non-distended  Not tender   Endo: no ophthalmopathy, no dermopathy, truncal obesity  MSK: no sarcopenia  Neuro: Oriented to person, place and time  Normal Speech  Normal gait  Psych: Normal affect and mood   No delusion or hallucinations, normal thought process

## 2022-12-20 ENCOUNTER — TELEPHONE (OUTPATIENT)
Dept: PULMONOLOGY | Facility: CLINIC | Age: 52
End: 2022-12-20

## 2022-12-30 DIAGNOSIS — E78.5 HYPERLIPIDEMIA, UNSPECIFIED HYPERLIPIDEMIA TYPE: ICD-10-CM

## 2022-12-30 RX ORDER — ROSUVASTATIN CALCIUM 5 MG/1
TABLET, COATED ORAL
Qty: 90 TABLET | Refills: 0 | Status: SHIPPED | OUTPATIENT
Start: 2022-12-30

## 2023-01-17 ENCOUNTER — PATIENT MESSAGE (OUTPATIENT)
Dept: BARIATRICS | Facility: CLINIC | Age: 53
End: 2023-01-17

## 2023-01-17 ENCOUNTER — TELEPHONE (OUTPATIENT)
Dept: BARIATRICS | Facility: CLINIC | Age: 53
End: 2023-01-17

## 2023-01-17 DIAGNOSIS — R73.03 PREDIABETES: Primary | ICD-10-CM

## 2023-01-17 NOTE — TELEPHONE ENCOUNTER
Contacted Lynn at [facility] to see status of Tirzepatide  It is long term out of stock with no ETA and they are unable to fill prescription  Did you want to leave prescription on hold there or try different medication or pharmacy?

## 2023-01-26 ENCOUNTER — OFFICE VISIT (OUTPATIENT)
Dept: FAMILY MEDICINE CLINIC | Facility: CLINIC | Age: 53
End: 2023-01-26

## 2023-01-26 VITALS
SYSTOLIC BLOOD PRESSURE: 118 MMHG | OXYGEN SATURATION: 94 % | TEMPERATURE: 97.8 F | HEART RATE: 80 BPM | DIASTOLIC BLOOD PRESSURE: 80 MMHG

## 2023-01-26 DIAGNOSIS — E66.01 OBESITY, CLASS III, BMI 40-49.9 (MORBID OBESITY) (HCC): ICD-10-CM

## 2023-01-26 DIAGNOSIS — H10.32 ACUTE BACTERIAL CONJUNCTIVITIS OF LEFT EYE: Primary | ICD-10-CM

## 2023-01-26 RX ORDER — TOBRAMYCIN 3 MG/ML
1 SOLUTION/ DROPS OPHTHALMIC
Qty: 1.3 ML | Refills: 0 | Status: SHIPPED | OUTPATIENT
Start: 2023-01-26 | End: 2023-01-31

## 2023-01-26 NOTE — ASSESSMENT & PLAN NOTE
To begin tobramycin to left eye  Counseled importance of good handwashing to prevent spread  May use warm compress for additional relief  Follow-up if no improvement in 3 days or sooner if symptoms worsen

## 2023-01-26 NOTE — PROGRESS NOTES
Assessment/Plan:    Acute bacterial conjunctivitis of left eye  To begin tobramycin to left eye  Counseled importance of good handwashing to prevent spread  May use warm compress for additional relief  Follow-up if no improvement in 3 days or sooner if symptoms worsen  Obesity, Class III, BMI 40-49 9 (morbid obesity) ([place] [address] )  Counseled the importance of consuming a healthy diet and engaging in daily physical activity to reduce BMI  Diagnoses and all orders for this visit:    Acute bacterial conjunctivitis of left eye  -     tobramycin (TOBREX) 0 3 % SOLN; Administer 1 drop into the left eye every 4 (four) hours while awake for 5 days    Obesity, Class III, BMI 40-49 9 (morbid obesity) ([facility])          Subjective:      Patient ID: Lina Capellan is a 46 y o  male  Dustin Quest presents with a red and itchy eye that started yesterday  He is also having discharge from his eye  Denies deep eye pain, trauma to eye, URI symptoms, or change in vision  Nothing is been used to treat his symptoms  The following portions of the patient's history were reviewed and updated as appropriate:   He   Patient Active Problem List    Diagnosis Date Noted   • Acute bacterial conjunctivitis of left eye 01/26/2023   • Prediabetes 09/27/2022   • At risk for sleep apnea 09/27/2022   • Obesity, Class III, BMI 40-49 9 (morbid obesity) ([place] [address] ) 05/20/2021   • Mixed hyperlipidemia 05/20/2021   • SOB (shortness of breath) 03/03/2021   • Anxiety 03/03/2021     Current Outpatient Medications   Medication Sig Dispense Refill   • fluticasone-umeclidinium-vilanterol (Trelegy Ellipta) 200-62 5-25 MCG/INH AEPB inhaler Inhale 1 puff daily Rinse mouth after use   180 blister 1   • metFORMIN (GLUCOPHAGE) 500 mg tablet TAKE 1 TABLET BY MOUTH  TWICE DAILY WITH MEALS 180 tablet 0   • rosuvastatin (CRESTOR) 5 mg tablet TAKE 1 TABLET BY MOUTH DAILY 90 tablet 0   • sildenafil (VIAGRA) 50 MG tablet Take 1 tablet (50 mg total) by mouth daily as needed for erectile dysfunction 10 tablet 0   • tirzepatide 2 5 MG/0 5ML Inject 0 5 mL (2 5 mg total) under the skin once a week 2 mL 1   • tobramycin (TOBREX) 0 3 % SOLN Administer 1 drop into the left eye every 4 (four) hours while awake for 5 days 1 3 mL 0   • albuterol (2 5 mg/3 mL) 0 083 % nebulizer solution Take 3 mL (2 5 mg total) by nebulization every 6 (six) hours as needed for wheezing or shortness of breath 120 mL 0   • albuterol (PROVENTIL HFA,VENTOLIN HFA) 90 mcg/act inhaler Inhale 2 puffs every 6 (six) hours as needed for wheezing or shortness of breath 1 g 2   • Clenpiq 10-3 5-12 MG-GM -GM/160ML SOLN ML ORALLY 1 1 DAYS     • fluticasone (FLONASE) 50 mcg/act nasal spray SPRAY 1 SPRAY INTO EACH NOSTRIL EVERY DAY (Patient not taking: Reported on 12/19/2022) 16 mL 3     No current facility-administered medications for this visit  He has No Known Allergies       Review of Systems   Constitutional: Negative  HENT: Positive for congestion  Eyes: Positive for discharge, redness and itching  Negative for pain and visual disturbance  Respiratory: Positive for shortness of breath (Chronic due to COVID long haulers)  Cardiovascular: Negative  Gastrointestinal: Negative  Neurological: Negative  Psychiatric/Behavioral: Negative  /80 (BP Location: Right arm, Patient Position: Sitting)   Pulse 80   Temp 97 8 °F (36 6 °C) (Tympanic)   SpO2 94%     Objective:     Physical Exam  Vitals and nursing note reviewed  Constitutional:       Appearance: Normal appearance  He is well-developed  HENT:      Head: Normocephalic and atraumatic  Right Ear: Tympanic membrane, ear canal and external ear normal       Left Ear: Tympanic membrane, ear canal and external ear normal       Nose: Nose normal       Mouth/Throat:      Mouth: Mucous membranes are moist       Pharynx: Oropharynx is clear  Eyes:      General: Vision grossly intact  Gaze aligned appropriately           Left eye: Discharge present  Extraocular Movements: Extraocular movements intact  Conjunctiva/sclera:      Left eye: Left conjunctiva is injected  Pupils: Pupils are equal, round, and reactive to light  Cardiovascular:      Rate and Rhythm: Normal rate and regular rhythm  Heart sounds: Normal heart sounds  No murmur heard  Pulmonary:      Effort: Pulmonary effort is normal  No respiratory distress  Breath sounds: Normal breath sounds  No wheezing or rales  Chest:      Chest wall: No tenderness  Musculoskeletal:      Cervical back: Neck supple  Neurological:      General: No focal deficit present  Mental Status: He is alert and oriented to person, place, and time  Psychiatric:         Mood and Affect: Mood normal          Behavior: Behavior normal          Thought Content:  Thought content normal          Judgment: Judgment normal

## 2023-01-26 NOTE — ASSESSMENT & PLAN NOTE
Counseled the importance of consuming a healthy diet and engaging in daily physical activity to reduce BMI

## 2023-01-27 ENCOUNTER — TELEPHONE (OUTPATIENT)
Dept: FAMILY MEDICINE CLINIC | Facility: CLINIC | Age: 53
End: 2023-01-27

## 2023-01-27 DIAGNOSIS — J01.10 ACUTE NON-RECURRENT FRONTAL SINUSITIS: Primary | ICD-10-CM

## 2023-01-27 RX ORDER — AMOXICILLIN 500 MG/1
500 CAPSULE ORAL EVERY 8 HOURS SCHEDULED
Qty: 21 CAPSULE | Refills: 0 | Status: SHIPPED | OUTPATIENT
Start: 2023-01-27 | End: 2023-02-03

## 2023-01-27 NOTE — TELEPHONE ENCOUNTER
Was in yesterday to see Traci Jameson  He thinks now he may have a sinus infection  Can you prescribe something?

## 2023-01-28 ENCOUNTER — NURSE TRIAGE (OUTPATIENT)
Dept: OTHER | Facility: OTHER | Age: 53
End: 2023-01-28

## 2023-01-28 NOTE — TELEPHONE ENCOUNTER
Patient advised that Amoxicillin 500 mg capsule prescription was sent to [facility] pharmacy yesterday  Patient verbalized understanding and reported that is all he was calling about and did not realize it was sent

## 2023-01-28 NOTE — TELEPHONE ENCOUNTER
Reason for Disposition  • Health Information question, no triage required and triager able to answer question    Answer Assessment - Initial Assessment Questions  1  REASON FOR CALL or QUESTION: "What is your reason for calling today?" or "How can I best help you?" or "What question do you have that I can help answer?"      I called the office yesterday and was awaiting a call back regarding a sinus infection and medication      Protocols used: INFORMATION ONLY CALL - [facility] TRIAGE[facility]

## 2023-01-28 NOTE — TELEPHONE ENCOUNTER
Regarding: Sinus infection  ----- Message from Oanh Murillo sent at 1/28/2023 11:59 AM EST -----  "I was in the office yesterday with what I thought was pink eye, but I believe it's a sinus infection "

## 2023-02-17 DIAGNOSIS — E66.01 CLASS 3 SEVERE OBESITY DUE TO EXCESS CALORIES WITHOUT SERIOUS COMORBIDITY WITH BODY MASS INDEX (BMI) OF 45.0 TO 49.9 IN ADULT (HCC): ICD-10-CM

## 2023-02-17 DIAGNOSIS — E88.81 INSULIN RESISTANCE: ICD-10-CM

## 2023-03-21 DIAGNOSIS — J45.909 UNCOMPLICATED ASTHMA, UNSPECIFIED ASTHMA SEVERITY, UNSPECIFIED WHETHER PERSISTENT: ICD-10-CM

## 2023-03-22 RX ORDER — FLUTICASONE FUROATE, UMECLIDINIUM BROMIDE AND VILANTEROL TRIFENATATE 200; 62.5; 25 UG/1; UG/1; UG/1
POWDER RESPIRATORY (INHALATION)
Qty: 180 EACH | Refills: 1 | Status: SHIPPED | OUTPATIENT
Start: 2023-03-22

## 2023-03-27 PROBLEM — H10.32 ACUTE BACTERIAL CONJUNCTIVITIS OF LEFT EYE: Status: RESOLVED | Noted: 2023-01-26 | Resolved: 2023-03-27

## 2023-05-10 ENCOUNTER — OFFICE VISIT (OUTPATIENT)
Age: 53
End: 2023-05-10

## 2023-05-10 ENCOUNTER — TELEPHONE (OUTPATIENT)
Dept: PULMONOLOGY | Facility: CLINIC | Age: 53
End: 2023-05-10

## 2023-05-10 VITALS
OXYGEN SATURATION: 97 % | DIASTOLIC BLOOD PRESSURE: 84 MMHG | SYSTOLIC BLOOD PRESSURE: 124 MMHG | WEIGHT: 315 LBS | HEART RATE: 86 BPM | BODY MASS INDEX: 41.75 KG/M2 | HEIGHT: 73 IN | TEMPERATURE: 97.8 F

## 2023-05-10 DIAGNOSIS — E66.01 MORBID OBESITY (HCC): ICD-10-CM

## 2023-05-10 DIAGNOSIS — U09.9 COVID-19 LONG HAULER: ICD-10-CM

## 2023-05-10 DIAGNOSIS — J45.40 MODERATE PERSISTENT ASTHMA WITHOUT COMPLICATION: ICD-10-CM

## 2023-05-10 DIAGNOSIS — J45.40 MODERATE PERSISTENT ASTHMA WITHOUT COMPLICATION: Primary | ICD-10-CM

## 2023-05-10 DIAGNOSIS — R06.02 SOB (SHORTNESS OF BREATH): Primary | ICD-10-CM

## 2023-05-10 RX ORDER — ALBUTEROL SULFATE 90 UG/1
2 AEROSOL, METERED RESPIRATORY (INHALATION) EVERY 6 HOURS PRN
Qty: 18 G | Refills: 2 | Status: SHIPPED | OUTPATIENT
Start: 2023-05-10 | End: 2023-05-10 | Stop reason: SDUPTHER

## 2023-05-10 RX ORDER — ALBUTEROL SULFATE 90 UG/1
2 AEROSOL, METERED RESPIRATORY (INHALATION) EVERY 6 HOURS PRN
Qty: 18 G | Refills: 2 | Status: SHIPPED | OUTPATIENT
Start: 2023-05-10

## 2023-05-10 NOTE — TELEPHONE ENCOUNTER
Patient called stating that he was returning a call from the office about a CT scan but I do not see any notes  He stated that he has not gotten one done yet but he can  He asked if we could send the order to his home address because he would not be getting the CT done at a [facility] facility  Please advise

## 2023-05-10 NOTE — PROGRESS NOTES
"Assessment/Plan:   Diagnoses and all orders for this visit:    SOB (shortness of breath)  -     POCT 6 minute walk    Moderate persistent asthma without complication  -     albuterol (PROVENTIL HFA,VENTOLIN HFA) 90 mcg/act inhaler; Inhale 2 puffs every 6 (six) hours as needed for wheezing or shortness of breath    Morbid obesity (HCC)    COVID-19 long hauler    Other orders  -     Discontinue: albuterol (PROVENTIL HFA,VENTOLIN HFA) 90 mcg/act inhaler; Inhale 2 puffs every 6 (six) hours as needed for wheezing or shortness of breath       shortness of breath and dyspnea on exertion with reactive airway disease moderate persistent asthma without complication not in any exacerbation currently   PFTs with FEV1 of 51% with response to the bronchodilator in the small airways 6 minute walk test did not need any supplemental oxygen, he could only walk for 4 minutes he states he walked at a slightly higher pace today and had to stop after 4 minutes  A high-resolution CAT scan was ordered for him the past, he had some insurance issues did not get a at [facility] he did have his PFT done at [facility] also the CAT scan done there did not have the report yet, discussed with patient to see if he can get the report from the hospital   Discussed with the patient to continue with Trelegy 1 puff daily  contiue with albuterol via nebulizer four times daily as needed  Rinse mouth after use  Recommend weight loss  Follow up in 6 months or prn earlier as needed    Return in about 6 months (around 11/10/2023)  All questions are answered to the patient's satisfaction and understanding  He verbalizes understanding  He is encouraged to call with any further questions or concerns  Portions of the record may have been created with voice recognition software  Occasional wrong word or \"sound a like\" substitutions may have occurred due to the inherent limitations of voice recognition software    Read the chart carefully and recognize, using " context, where substitutions have occurred  Electronically Signed by Sada Parks MD    ______________________________________________________________________    Chief Complaint:   Chief Complaint   Patient presents with   • Follow-up       Patient ID: Nancy Kelley is a 46 y o  y o  male has a past medical history of Cough, Cough, Cough, High cholesterol, Obesity, Class III, BMI 40-49 9 (morbid obesity) ([facility] [address] ), SOB (shortness of breath), SOB (shortness of breath), SOB (shortness of breath), SOB (shortness of breath), SOB (shortness of breath), Wheezing, Wheezing, and Wheezing  5/10/2023  Patient presents today for follow-up visit  Nancy Kelley is a very pleasant 59-year-old gentleman, former smoker with less than 10 pack-year smoking history who quit a year ago, who is a  and 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, and a  states he has been doing 2 jobs for several years, until he contracted COVID-23 infection in December of 2020 states he was not hospitalized he could manage it at home with supportive treatment as well as the steroids he states, he was also given an inhaler given he was having shortness of breath and wheezing he states he has been having asthma in the past with allergies especially hay fever and and when his wheezing was bad after the COVID-19 infection he was given albuterol inhaler which he has been using once or twice a day he states  He is continuing to use the Trelegy 1 puff once a day and using the albuterol via nebulizer 3 times daily and he states he has been feeling better than before walking slightly longer distances than  before  Although he is not back to his baseline  Review of Systems   Constitutional: Negative  HENT: Negative  Eyes: Negative  Respiratory: Positive for shortness of breath  He states that shortness of breath and dyspnea on exertion has improved since last visit, although he is not back to his baseline pre-COVID state he states  Cardiovascular: Negative  Gastrointestinal: Negative  Endocrine: Negative  Genitourinary: Negative  Musculoskeletal: Negative  Allergic/Immunologic: Negative  Neurological: Negative  Hematological: Negative  Psychiatric/Behavioral: Negative  Smoking history: He reports that he quit smoking about 3 years ago  His smoking use included cigarettes  He started smoking about 33 years ago  He has a 7 50 pack-year smoking history   He has never used smokeless tobacco     The following portions of the patient's history were reviewed and updated as appropriate: allergies, current medications, past family history, past medical history, past social history, past surgical history and problem list     Immunization History   Administered Date(s) Administered   • Rabies Immune Globulin 09/11/2022   • Rabies-IM Human Diploid Cell Culture 09/11/2022, 09/14/2022, 09/19/2022, 09/27/2022   • Tdap 09/11/2022     Current Outpatient Medications   Medication Sig Dispense Refill   • albuterol (2 5 mg/3 mL) 0 083 % nebulizer solution Take 3 mL (2 5 mg total) by nebulization every 6 (six) hours as needed for wheezing or shortness of breath 120 mL 0   • albuterol (PROVENTIL HFA,VENTOLIN HFA) 90 mcg/act inhaler Inhale 2 puffs every 6 (six) hours as needed for wheezing or shortness of breath 18 g 2   • Clenpiq 10-3 5-12 MG-GM -GM/160ML SOLN ML ORALLY 1 1 DAYS     • metFORMIN (GLUCOPHAGE) 500 mg tablet TAKE 1 TABLET BY MOUTH TWICE  DAILY WITH MEALS 180 tablet 0   • rosuvastatin (CRESTOR) 5 mg tablet TAKE 1 TABLET BY MOUTH DAILY 90 tablet 0   • sildenafil (VIAGRA) 50 MG tablet Take 1 tablet (50 mg total) by mouth daily as needed for erectile dysfunction 10 tablet 0   • tirzepatide 2 5 MG/0 5ML Inject 0 5 mL (2 5 mg total) under the skin once a week 2 mL 1   • Trelegy Ellipta 200-62 5-25 MCG/ACT AEPB inhaler USE 1 INHALATION BY MOUTH  ONCE DAILY AT THE SAME TIME EACH DAY RINSE MOUTH AFTER   each 1   • "fluticasone (FLONASE) 50 mcg/act nasal spray SPRAY 1 SPRAY INTO EACH NOSTRIL EVERY DAY (Patient not taking: Reported on 12/19/2022) 16 mL 3     No current facility-administered medications for this visit  Allergies: Patient has no known allergies  Objective:  Vitals:    05/10/23 0914   BP: 124/84   Pulse: 86   Temp: 97 8 °F (36 6 °C)   SpO2: 97%   Weight: (!) 172 kg (379 lb)   Height: 6' 1 1\" (1 857 m)   Oxygen Therapy  SpO2: 97 %    Wt Readings from Last 3 Encounters:   05/10/23 (!) 172 kg (379 lb)   12/19/22 (!) 169 kg (373 lb)   11/14/22 (!) 163 kg (360 lb)     Body mass index is 49 87 kg/m²  Physical Exam  Constitutional:       Appearance: He is well-developed  HENT:      Head: Normocephalic and atraumatic  Eyes:      Pupils: Pupils are equal, round, and reactive to light  Neck:      Comments: Short and wide neck  Cardiovascular:      Rate and Rhythm: Normal rate and regular rhythm  Heart sounds: Normal heart sounds  Pulmonary:      Effort: Pulmonary effort is normal       Breath sounds: Normal breath sounds  Musculoskeletal:         General: Normal range of motion  Cervical back: Normal range of motion and neck supple  Skin:     General: Skin is warm and dry  Neurological:      Mental Status: He is alert and oriented to person, place, and time  Psychiatric:         Behavior: Behavior normal            Diagnostics:  I have personally reviewed pertinent reports        "

## 2023-05-13 DIAGNOSIS — R73.03 PREDIABETES: ICD-10-CM

## 2023-05-13 DIAGNOSIS — E66.01 CLASS 3 SEVERE OBESITY DUE TO EXCESS CALORIES WITHOUT SERIOUS COMORBIDITY WITH BODY MASS INDEX (BMI) OF 45.0 TO 49.9 IN ADULT (HCC): ICD-10-CM

## 2023-05-13 DIAGNOSIS — E88.81 INSULIN RESISTANCE: ICD-10-CM

## 2023-05-13 DIAGNOSIS — E78.5 HYPERLIPIDEMIA, UNSPECIFIED HYPERLIPIDEMIA TYPE: ICD-10-CM

## 2023-05-15 RX ORDER — ROSUVASTATIN CALCIUM 5 MG/1
5 TABLET, COATED ORAL DAILY
Qty: 90 TABLET | Refills: 0 | Status: SHIPPED | OUTPATIENT
Start: 2023-05-15

## 2023-05-17 ENCOUNTER — OFFICE VISIT (OUTPATIENT)
Dept: BARIATRICS | Facility: CLINIC | Age: 53
End: 2023-05-17

## 2023-05-17 VITALS
BODY MASS INDEX: 41.75 KG/M2 | HEART RATE: 104 BPM | SYSTOLIC BLOOD PRESSURE: 110 MMHG | HEIGHT: 73 IN | OXYGEN SATURATION: 98 % | DIASTOLIC BLOOD PRESSURE: 76 MMHG | WEIGHT: 315 LBS | RESPIRATION RATE: 18 BRPM

## 2023-05-17 DIAGNOSIS — E66.01 OBESITY, CLASS III, BMI 40-49.9 (MORBID OBESITY) (HCC): Primary | ICD-10-CM

## 2023-05-17 DIAGNOSIS — R73.03 PREDIABETES: ICD-10-CM

## 2023-05-17 DIAGNOSIS — E78.2 MIXED HYPERLIPIDEMIA: ICD-10-CM

## 2023-05-17 NOTE — PROGRESS NOTES
Assessment/Plan:  Syd Mata was seen today for follow-up  Diagnoses and all orders for this visit:  1  Obesity, Class III, BMI 40-49 9 (morbid obesity) ([facility] [address] )  Restart Mounjaro  rediscussed bariatric surgery - advised patient with surgery loses about 50% and with medical weight loss 10%- he declines for now  Diet plan given: mediterranean diet 1800kcal  Advised patient if diet is not followed closely he will not have any results just by taking the medication  Advised bariatric surgery  - tirzepatide 2 5 MG/0 5ML; Inject 0 5 mL (2 5 mg total) under the skin once a week  Dispense: 2 mL; Refill: 0    2  Prediabetes    - tirzepatide 2 5 MG/0 5ML; Inject 0 5 mL (2 5 mg total) under the skin once a week  Dispense: 2 mL; Refill: 0    3  Mixed hyperlipidemia   CV risk factors will decrease with weight loss  At risk for sleep apnea  Encouraged to schedule a sleep study  Encourage mindful eating, portion control, motivational interview performed to help patient reach goals   Patient denies personal and family history of  pancreatitis, thyroid cancer, MEN-2 tumors  Follow up in approximately every 3 weeks for weight check and in 2 mo with me      Subjective:   Chief Complaint   Patient presents with   • Follow-up     Patient here to discuss weight associated problems and nutrition goals  HPI: Mary Barroso  is a 46 y o  male with excess weight/obesity here to pursue weight management    Patient is pursuing Conservative Program      Wt Readings from Last 10 Encounters:   05/17/23 (!) 168 kg (370 lb)   05/10/23 (!) 172 kg (379 lb)   12/19/22 (!) 169 kg (373 lb)   11/14/22 (!) 163 kg (360 lb)   10/05/22 (!) 168 kg (371 lb)   09/27/22 (!) 171 kg (376 lb 9 6 oz)   09/14/22 (!) 173 kg (381 lb)   08/26/22 (!) 173 kg (381 lb)   06/22/22 (!) 173 kg (381 lb)   06/21/22 (!) 175 kg (386 lb 12 8 oz)        Rybelsus started 4/2021 but he was unable to f/u after 5/2021  Went down to 347 lbs on rybelsus but could not afford after being put on "[address] [place] [address] Adilene Saucedo  Was eating cheap food that was affordable- gained weight  Margaux Desouza 12/2022 - used it for one mo but did not follow up due to cost $200     Last OV :12/22 weight 373lbs  Current weight : 370lbs lost 3lbs  Food logging: no  Increased appetite/cravings:yes  Not well structured  Snacks: fruits, banana, apples   Exercise:mobility is improving now that he can breathe  Hydration:1 5 gallon per day water  Alcohol: no          The following portions of the patient's history were reviewed and updated as appropriate: allergies, current medications, past family history, past medical history, past social history, past surgical history, and problem list       Review of Systems   Constitutional: Negative for activity change  Fatigue  HENT: Negative for trouble swallowing  Respiratory: + for shortness of breath  Cardiovascular: Negative for chest pain, edema  Gastrointestinal: Negative for abdominal pain, nausea and vomiting, acid reflux, constipation/diarrhea  Psychiatric/Behavioral: Negative for behavioral problems , anxiety or depression    Objective:  /76 (BP Location: Left arm, Patient Position: Sitting, Cuff Size: Adult)   Pulse 104   Resp 18   Ht 6' 1 1\" (1 857 m)   Wt (!) 168 kg (370 lb)   SpO2 98%   BMI 48 68 kg/m²   Constitutional: Well-developed, well-nourished and Obese Body mass index is 48 68 kg/m²  Raiza Hanna HEENT: No conjunctival injection  Pulmonary: No increased work of breathing or signs of respiratory distress  CV: Well-perfused, Regular rate and rhythm, no edema  GI: increased abdominal girth  Non-distended  Endo: no ophthalmopathy, no dermopathy, truncal obesity  MSK: no sarcopenia  Neuro: Oriented to person, place and time  Normal Speech  Normal gait  Psych: Normal affect and mood   No delusion or hallucinations, normal thought process  "

## 2023-06-07 ENCOUNTER — TELEPHONE (OUTPATIENT)
Dept: BARIATRICS | Facility: CLINIC | Age: 53
End: 2023-06-07

## 2023-06-07 ENCOUNTER — CLINICAL SUPPORT (OUTPATIENT)
Dept: BARIATRICS | Facility: CLINIC | Age: 53
End: 2023-06-07

## 2023-06-07 VITALS
OXYGEN SATURATION: 97 % | BODY MASS INDEX: 41.75 KG/M2 | RESPIRATION RATE: 18 BRPM | DIASTOLIC BLOOD PRESSURE: 78 MMHG | HEART RATE: 102 BPM | SYSTOLIC BLOOD PRESSURE: 120 MMHG | WEIGHT: 315 LBS | HEIGHT: 73 IN

## 2023-06-07 DIAGNOSIS — R73.03 PREDIABETES: Primary | ICD-10-CM

## 2023-06-07 DIAGNOSIS — E66.01 OBESITY, CLASS III, BMI 40-49.9 (MORBID OBESITY) (HCC): ICD-10-CM

## 2023-06-07 DIAGNOSIS — E66.01 MORBID (SEVERE) OBESITY DUE TO EXCESS CALORIES (HCC): Primary | ICD-10-CM

## 2023-06-07 PROCEDURE — RECHECK

## 2023-06-07 NOTE — PROGRESS NOTES
Patient comes to office today for Weight Check  Initial Weight on 05/17/2023 was 370 pounds  Today weighs in at 362 pounds  Total weight lost is 8 pounds  Patient states has no ill side effects to Tirzepatide injection  Patient requesting to increase medication to next escalating dose

## 2023-06-07 NOTE — TELEPHONE ENCOUNTER
Good Day Dr Radha Nazario,     Patient comes to office today for Weight Check  Initial Weight on 05/17/2023 was 370 pounds  Today weighs in at 362 pounds  Total weight lost is 8 pounds  Patient states has no ill side effects to Tirzepatide injection  Patient requesting to increase medication to next escalating dose  Thank you

## 2023-06-20 RX ORDER — FLUTICASONE FUROATE, UMECLIDINIUM BROMIDE AND VILANTEROL TRIFENATATE 200; 62.5; 25 UG/1; UG/1; UG/1
1 POWDER RESPIRATORY (INHALATION) DAILY
Qty: 56 BLISTER | Refills: 0 | Status: SHIPPED | COMMUNITY
Start: 2023-06-20 | End: 2023-08-14 | Stop reason: SDUPTHER

## 2023-06-28 DIAGNOSIS — E88.81 INSULIN RESISTANCE: ICD-10-CM

## 2023-06-28 DIAGNOSIS — E66.01 CLASS 3 SEVERE OBESITY DUE TO EXCESS CALORIES WITHOUT SERIOUS COMORBIDITY WITH BODY MASS INDEX (BMI) OF 45.0 TO 49.9 IN ADULT (HCC): ICD-10-CM

## 2023-06-28 DIAGNOSIS — E78.5 HYPERLIPIDEMIA, UNSPECIFIED HYPERLIPIDEMIA TYPE: ICD-10-CM

## 2023-06-28 RX ORDER — ROSUVASTATIN CALCIUM 5 MG/1
TABLET, COATED ORAL
Qty: 90 TABLET | Refills: 0 | Status: SHIPPED | OUTPATIENT
Start: 2023-06-28

## 2023-07-12 ENCOUNTER — TELEPHONE (OUTPATIENT)
Dept: BARIATRICS | Facility: CLINIC | Age: 53
End: 2023-07-12

## 2023-07-12 ENCOUNTER — CLINICAL SUPPORT (OUTPATIENT)
Dept: BARIATRICS | Facility: CLINIC | Age: 53
End: 2023-07-12

## 2023-07-12 VITALS
BODY MASS INDEX: 40.43 KG/M2 | HEIGHT: 74 IN | OXYGEN SATURATION: 96 % | RESPIRATION RATE: 16 BRPM | WEIGHT: 315 LBS | DIASTOLIC BLOOD PRESSURE: 84 MMHG | SYSTOLIC BLOOD PRESSURE: 120 MMHG | HEART RATE: 103 BPM

## 2023-07-12 DIAGNOSIS — E66.9 OBESITY, CLASS I, BMI 30-34.9: Primary | ICD-10-CM

## 2023-07-12 DIAGNOSIS — R73.03 PREDIABETES: ICD-10-CM

## 2023-07-12 DIAGNOSIS — R73.03 PREDIABETES: Primary | ICD-10-CM

## 2023-07-12 PROCEDURE — RECHECK

## 2023-07-12 NOTE — Clinical Note
Patient in for [facility] weight check. Patient denies any side effects from medication. Wants to go up the next dose.   Wt Readings from Last 3 Encounters: 07/12/23 : (!) 163 kg (359 lb 6.4 oz) 06/07/23 : (!) 164 kg (362 lb 3.2 oz) 05/17/23 : (!) 168 kg (370 lb)

## 2023-07-12 NOTE — TELEPHONE ENCOUNTER
----- Message from Carri Galvez sent at 7/12/2023 10:35 AM EDT -----  Patient in for [facility] weight check. Patient denies any side effects from medication. Wants to go up the next dose.     Wt Readings from Last 3 Encounters:  07/12/23 : (!) 163 kg (359 lb 6.4 oz)  06/07/23 : (!) 164 kg (362 lb 3.2 oz)  05/17/23 : (!) 168 kg (370 lb)

## 2023-07-13 ENCOUNTER — TELEPHONE (OUTPATIENT)
Dept: BARIATRICS | Facility: CLINIC | Age: 53
End: 2023-07-13

## 2023-07-14 NOTE — TELEPHONE ENCOUNTER
Received prior authorization denial for the [facility] based on [employer] needing more information. I contacted phone # provided [phone] and spoke to Franchesca (prior authorization specialist) and she stated they received my fax with the requested information and it's pending a determination which should be reached within 24-72 hours.

## 2023-07-17 NOTE — TELEPHONE ENCOUNTER
Received another denial from [employer] on the [facility]. Contacted [employer] and spoke to [employer]. She said a few prior authorizations were started, one by the patient requesting urgent review which was denied, then the one I did which was denied also. Patient's employer did another prior authorization which was approved,  but only if patient got prescription filled through [facility]. It is not approved to br filled elsewhere. Authorization #[id number]. I contacted patient via [employer] to let him know. Told patient to contact [employer] with any questions.

## 2023-07-24 DIAGNOSIS — R73.03 PREDIABETES: ICD-10-CM

## 2023-07-25 NOTE — TELEPHONE ENCOUNTER
Good Day Scahi,    I have called [facility] and am awaiting a call back. Spoke to patient as well and will hopefully hear from [facility] soon. Thank you.

## 2023-07-25 NOTE — TELEPHONE ENCOUNTER
Good Day Sachi,    Spoke with pharmacy. They can fill script, but medication is on back order. Patient aware. Thank you.

## 2023-08-01 ENCOUNTER — HOSPITAL ENCOUNTER (OUTPATIENT)
Dept: RADIOLOGY | Facility: HOSPITAL | Age: 53
Discharge: HOME | End: 2023-08-01
Payer: COMMERCIAL

## 2023-08-01 ENCOUNTER — OFFICE VISIT (OUTPATIENT)
Dept: FAMILY MEDICINE CLINIC | Facility: CLINIC | Age: 53
End: 2023-08-01
Payer: COMMERCIAL

## 2023-08-01 ENCOUNTER — APPOINTMENT (OUTPATIENT)
Dept: LAB | Facility: HOSPITAL | Age: 53
End: 2023-08-01
Payer: COMMERCIAL

## 2023-08-01 VITALS
SYSTOLIC BLOOD PRESSURE: 110 MMHG | HEART RATE: 86 BPM | HEIGHT: 73 IN | BODY MASS INDEX: 47.42 KG/M2 | TEMPERATURE: 96 F | DIASTOLIC BLOOD PRESSURE: 76 MMHG | OXYGEN SATURATION: 93 %

## 2023-08-01 DIAGNOSIS — Z12.11 SCREENING FOR COLON CANCER: ICD-10-CM

## 2023-08-01 DIAGNOSIS — E78.5 HYPERLIPIDEMIA, UNSPECIFIED HYPERLIPIDEMIA TYPE: ICD-10-CM

## 2023-08-01 DIAGNOSIS — E66.01 OBESITY, CLASS III, BMI 40-49.9 (MORBID OBESITY) (HCC): ICD-10-CM

## 2023-08-01 DIAGNOSIS — M54.50 LOW BACK PAIN WITHOUT SCIATICA, UNSPECIFIED BACK PAIN LATERALITY, UNSPECIFIED CHRONICITY: ICD-10-CM

## 2023-08-01 DIAGNOSIS — M70.22 OLECRANON BURSITIS OF LEFT ELBOW: ICD-10-CM

## 2023-08-01 DIAGNOSIS — F41.9 ANXIETY: Primary | ICD-10-CM

## 2023-08-01 DIAGNOSIS — E78.2 MIXED HYPERLIPIDEMIA: ICD-10-CM

## 2023-08-01 PROCEDURE — 72110 X-RAY EXAM L-2 SPINE 4/>VWS: CPT

## 2023-08-01 PROCEDURE — 73070 X-RAY EXAM OF ELBOW: CPT

## 2023-08-01 PROCEDURE — 99215 OFFICE O/P EST HI 40 MIN: CPT | Performed by: FAMILY MEDICINE

## 2023-08-01 RX ORDER — MELOXICAM 15 MG/1
15 TABLET ORAL DAILY
Qty: 30 TABLET | Refills: 3 | Status: SHIPPED | OUTPATIENT
Start: 2023-08-01

## 2023-08-01 NOTE — PROGRESS NOTES
Name: Antionette Dorsey      : 1970      MRN: [id number]  Encounter Provider: WES Snyder  Encounter Date: 2023   Encounter department: [address] [place] [address] [address]     1. Anxiety  Comments:  Discussed issues concerns reviewed history of events, recommend counseling/therapy  Orders:  -     Ambulatory Referral to [address] [place] [address]; Future    2. Obesity, Class III, BMI 40-49.9 (morbid obesity) ([address])  Comments:  Continue care per weight management, stressed dietary modifications  Orders:  -     Comprehensive metabolic panel; Future  -     Vitamin D 25 hydroxy; Future    3. Mixed hyperlipidemia  Comments: Tolerating statin well voiding encourage continuation along with dietary management  Orders:  -     Comprehensive metabolic panel; Future  -     Lipid Panel with Direct LDL reflex; Future    4. Hyperlipidemia, unspecified hyperlipidemia type  -     Comprehensive metabolic panel; Future  -     CBC and differential; Future  -     Vitamin D 25 hydroxy; Future  -     TSH, 3rd generation; Future  -     Lipid Panel with Direct LDL reflex; Future  -     UA w Reflex to Microscopic w Reflex to Culture -Lab Collect; Future; Expected date: 2023    5. Screening for colon cancer    6. Low back pain without sciatica, unspecified back pain laterality, unspecified chronicity  -     XR spine lumbar minimum 4 views non injury; Future; Expected date: 2023  -     Ambulatory Referral to [facility]; Future  -     meloxicam (Mobic) 15 mg tablet; Take 1 tablet (15 mg total) by mouth daily    7. Olecranon bursitis of left elbow  Comments:  Discussed plan of care recommendations, obtain x-rays left elbow follow-up  Orders:  -     XR elbow 2 vw left; Future; Expected date: 2023  -     Ambulatory Referral to Orthopedic Surgery; Future        Depression Screening and Follow-up Plan: Patient was screened for depression during today's encounter. They screened negative with a PHQ-2 score of 0. 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 The current episode started more than 1 month ago. Pertinent negatives include no abdominal pain, diaphoresis, fatigue, numbness or weakness. Review of Systems   Constitutional: Negative for diaphoresis and fatigue. Respiratory: Negative. Gastrointestinal: Negative for abdominal pain. Genitourinary: Negative. Musculoskeletal: Negative. Skin: Negative. Neurological: Negative for weakness and numbness. Psychiatric/Behavioral: Positive for sleep disturbance. The patient is nervous/anxious. Current Outpatient Medications on File Prior to Visit   Medication Sig   • albuterol (2.5 mg/3 mL) 0.083 % nebulizer solution Take 3 mL (2.5 mg total) by nebulization every 6 (six) hours as needed for wheezing or shortness of breath   • albuterol (PROVENTIL HFA,VENTOLIN HFA) 90 mcg/act inhaler Inhale 2 puffs every 6 (six) hours as needed for wheezing or shortness of breath   • fluticasone-umeclidinium-vilanterol (Trelegy Ellipta) 200-62.5-25 mcg/actuation AEPB inhaler Inhale 1 puff daily Rinse mouth after use.    • metFORMIN (GLUCOPHAGE) 500 mg tablet TAKE 1 TABLET BY MOUTH TWICE  DAILY WITH MEALS   • rosuvastatin (CRESTOR) 5 mg tablet TAKE 1 TABLET BY MOUTH DAILY   • tirzepatide (Mounjaro) 7.5 MG/0.5ML Inject 0.5 mL (7.5 mg total) under the skin every 7 days   • Trelegy Ellipta 200-62.5-25 MCG/ACT AEPB inhaler USE 1 INHALATION BY MOUTH  ONCE DAILY AT THE SAME TIME EACH DAY RINSE MOUTH AFTER  USE   • fluticasone (FLONASE) 50 mcg/act nasal spray SPRAY 1 SPRAY INTO EACH NOSTRIL EVERY DAY (Patient not taking: Reported on 12/19/2022)       Objective     /76 (BP Location: Left arm, Patient Position: Sitting, Cuff Size: Large)   Pulse 86   Temp (!) 96 °F (35.6 °C) (Tympanic)   Ht 6' 1" (1.854 m)   SpO2 93%   BMI 47.42 kg/m²     Physical Exam  Constitutional:       General: He is not in acute distress. Appearance: He is well-developed. He is obese. He is not ill-appearing or toxic-appearing. HENT:      Head: Normocephalic and atraumatic. Cardiovascular:      Rate and Rhythm: Normal rate and regular rhythm. Heart sounds: Normal heart sounds. Pulmonary:      Effort: Pulmonary effort is normal.      Breath sounds: Normal breath sounds. Abdominal:      General: Abdomen is protuberant. Musculoskeletal:         General: Normal range of motion. Left elbow: Swelling and effusion present. No tenderness. Cervical back: Normal range of motion and neck supple. Right lower leg: No edema. Left lower leg: No edema. Comments: Bursa bursa left full, negative warm tender erythemic   Skin:     General: Skin is warm and dry. Neurological:      Mental Status: He is alert and oriented to person, place, and time. Deep Tendon Reflexes: Reflexes are normal and symmetric. Psychiatric:         Attention and Perception: Attention normal.         Mood and Affect: Mood is not anxious. Affect is labile. Affect is not inappropriate. Speech: Speech normal.         Behavior: Behavior normal.         Thought Content:  Thought content normal. Thought content does not include homicidal or suicidal ideation. Thought content does not include homicidal or suicidal plan.          Cognition and Memory: Cognition and memory normal.         Judgment: Judgment normal.       WES Sarabia

## 2023-08-02 ENCOUNTER — TELEPHONE (OUTPATIENT)
Dept: BARIATRICS | Facility: CLINIC | Age: 53
End: 2023-08-02

## 2023-08-02 ENCOUNTER — TELEPHONE (OUTPATIENT)
Dept: PHYSICAL THERAPY | Facility: OTHER | Age: 53
End: 2023-08-02

## 2023-08-02 LAB
25(OH)D3 SERPL-MCNC: 27 NG/ML (ref 30–100)
ALBUMIN SERPL-MCNC: 4 G/DL (ref 3.6–5.1)
ALBUMIN/GLOB SERPL: 1.4 (CALC) (ref 1–2.5)
ALP SERPL-CCNC: 87 U/L (ref 35–144)
ALT SERPL-CCNC: 24 U/L (ref 9–46)
APPEARANCE UR: CLEAR
AST SERPL-CCNC: 16 U/L (ref 10–35)
BACTERIA UR QL AUTO: ABNORMAL /HPF
BASOPHILS # BLD AUTO: 95 CELLS/UL (ref 0–200)
BASOPHILS NFR BLD AUTO: 0.9 %
BILIRUB SERPL-MCNC: 0.4 MG/DL (ref 0.2–1.2)
BILIRUB UR QL STRIP: NEGATIVE
BUN SERPL-MCNC: 16 MG/DL (ref 7–25)
BUN/CREAT SERPL: NORMAL (CALC) (ref 6–22)
CALCIUM SERPL-MCNC: 9.8 MG/DL (ref 8.6–10.3)
CHLORIDE SERPL-SCNC: 105 MMOL/L (ref 98–110)
CHOLEST SERPL-MCNC: 194 MG/DL
CHOLEST/HDLC SERPL: 5 (CALC)
CO2 SERPL-SCNC: 26 MMOL/L (ref 20–32)
COLOR UR: YELLOW
CREAT SERPL-MCNC: 1.12 MG/DL (ref 0.7–1.3)
EOSINOPHIL # BLD AUTO: 233 CELLS/UL (ref 15–500)
EOSINOPHIL NFR BLD AUTO: 2.2 %
ERYTHROCYTE [DISTWIDTH] IN BLOOD BY AUTOMATED COUNT: 13.8 % (ref 11–15)
GFR/BSA.PRED SERPLBLD CYS-BASED-ARV: 79 ML/MIN/1.73M2
GLOBULIN SER CALC-MCNC: 2.9 G/DL (CALC) (ref 1.9–3.7)
GLUCOSE SERPL-MCNC: 94 MG/DL (ref 65–99)
GLUCOSE UR QL STRIP: NEGATIVE
HCT VFR BLD AUTO: 53.3 % (ref 38.5–50)
HDLC SERPL-MCNC: 39 MG/DL
HGB BLD-MCNC: 17.9 G/DL (ref 13.2–17.1)
HGB UR QL STRIP: NEGATIVE
HYALINE CASTS #/AREA URNS LPF: ABNORMAL /LPF
KETONES UR QL STRIP: NEGATIVE
LDLC SERPL CALC-MCNC: 125 MG/DL (CALC)
LEUKOCYTE ESTERASE UR QL STRIP: ABNORMAL
LYMPHOCYTES # BLD AUTO: 2449 CELLS/UL (ref 850–3900)
LYMPHOCYTES NFR BLD AUTO: 23.1 %
MCH RBC QN AUTO: 31 PG (ref 27–33)
MCHC RBC AUTO-ENTMCNC: 33.6 G/DL (ref 32–36)
MCV RBC AUTO: 92.2 FL (ref 80–100)
MONOCYTES # BLD AUTO: 869 CELLS/UL (ref 200–950)
MONOCYTES NFR BLD AUTO: 8.2 %
NEUTROPHILS # BLD AUTO: 6954 CELLS/UL (ref 1500–7800)
NEUTROPHILS NFR BLD AUTO: 65.6 %
NITRITE UR QL STRIP: NEGATIVE
NONHDLC SERPL-MCNC: 155 MG/DL (CALC)
PH UR STRIP: 5.5 [PH] (ref 5–8)
PLATELET # BLD AUTO: 217 THOUSAND/UL (ref 140–400)
PMV BLD REES-ECKER: 11.1 FL (ref 7.5–12.5)
POTASSIUM SERPL-SCNC: 4.5 MMOL/L (ref 3.5–5.3)
PROT SERPL-MCNC: 6.9 G/DL (ref 6.1–8.1)
PROT UR QL STRIP: NEGATIVE
RBC # BLD AUTO: 5.78 MILLION/UL (ref 4.2–5.8)
RBC #/AREA URNS HPF: ABNORMAL /HPF
SODIUM SERPL-SCNC: 140 MMOL/L (ref 135–146)
SP GR UR STRIP: 1.02 (ref 1–1.03)
SQUAMOUS #/AREA URNS HPF: ABNORMAL /HPF
TRIGL SERPL-MCNC: 181 MG/DL
TSH SERPL-ACNC: 2.06 MIU/L (ref 0.4–4.5)
WBC # BLD AUTO: 10.6 THOUSAND/UL (ref 3.8–10.8)
WBC #/AREA URNS HPF: ABNORMAL /HPF

## 2023-08-02 NOTE — TELEPHONE ENCOUNTER
Received call from patient that [facility] needed information about the [place] prescription. I contacted [facility] and spoke to Samuel Fitzgerald who stated it was too soon to fill the prescription, it was just filled yesterday at [facility] on [address]. Moving forward [facility] can fill the prescription and it would be $200 (Reference # [id number]). I contacted [facility], prescription is there ready for  with a copay of $100. I contacted patient and informed him that prescription was ready for  at [facility].  Patient said he would  prescription today.

## 2023-08-03 ENCOUNTER — RA CDI HCC (OUTPATIENT)
Dept: OTHER | Facility: HOSPITAL | Age: 53
End: 2023-08-03

## 2023-08-03 RX ORDER — TIRZEPATIDE 7.5 MG/.5ML
INJECTION, SOLUTION SUBCUTANEOUS
Refills: 0 | OUTPATIENT
Start: 2023-08-03

## 2023-08-03 NOTE — PROGRESS NOTES
[address] [facility] coding opportunities       Chart reviewed, no opportunity found: CHART REVIEWED, NO OPPORTUNITY FOUND        Patients Insurance        Commercial Insurance: [address]

## 2023-08-07 NOTE — TELEPHONE ENCOUNTER
Call placed to the patient per [facility] referral.     V/M left for patient to call back. Phone number and hours of business provided.      This is the 2nd attempt to reach the patient. Will defer per protocol.

## 2023-08-10 ENCOUNTER — OFFICE VISIT (OUTPATIENT)
Dept: FAMILY MEDICINE CLINIC | Facility: CLINIC | Age: 53
End: 2023-08-10
Payer: COMMERCIAL

## 2023-08-10 VITALS
HEART RATE: 108 BPM | BODY MASS INDEX: 47.42 KG/M2 | SYSTOLIC BLOOD PRESSURE: 124 MMHG | TEMPERATURE: 96 F | HEIGHT: 73 IN | DIASTOLIC BLOOD PRESSURE: 78 MMHG | OXYGEN SATURATION: 94 %

## 2023-08-10 DIAGNOSIS — E66.01 OBESITY, CLASS III, BMI 40-49.9 (MORBID OBESITY) (HCC): ICD-10-CM

## 2023-08-10 DIAGNOSIS — M70.22 OLECRANON BURSITIS, LEFT ELBOW: Primary | ICD-10-CM

## 2023-08-10 DIAGNOSIS — L91.8 SKIN TAGS, MULTIPLE ACQUIRED: ICD-10-CM

## 2023-08-10 DIAGNOSIS — E78.5 HYPERLIPIDEMIA, UNSPECIFIED HYPERLIPIDEMIA TYPE: ICD-10-CM

## 2023-08-10 PROCEDURE — 11200 RMVL SKIN TAGS UP TO&INC 15: CPT | Performed by: FAMILY MEDICINE

## 2023-08-10 PROCEDURE — 99214 OFFICE O/P EST MOD 30 MIN: CPT | Performed by: FAMILY MEDICINE

## 2023-08-10 NOTE — PROGRESS NOTES
Assessment/Plan:     Chronic Problems:  No problem-specific Assessment & Plan notes found for this encounter. Visit Diagnosis:  Diagnoses and all orders for this visit:    Olecranon bursitis, left elbow  Comments:  discussed cuasative factors , plan of care , evalwith sports med   Orders:  -     Ambulatory Referral to Sports Medicine; Future    Skin tags, multiple acquired  -     Skin tag removal    Obesity, Class III, BMI 40-49.9 (morbid obesity) ([facility])  Comments:  continue management weight ctr    Hyperlipidemia, unspecified hyperlipidemia type  Comments:  conitnue statin , stressed dietary modification , fiber , weight loss, exercise program           Subjective:    Patient ID: Sisi Portillo is a 48 y.o. male. Here for skin tag removal   Left chest upper wall 1  Left axilla 2  Review labs , xray   Continues on weight management regimen   Chol tolerating crestor , metforimin taken daily   Weight loss   Diet admits to changes , following the plan       The following portions of the patient's history were reviewed and updated as appropriate: allergies, current medications, past family history, past medical history, past social history, past surgical history and problem list.    Review of Systems   Constitutional: Negative for appetite change, chills, fever and unexpected weight change. HENT: Negative for congestion, dental problem, ear pain, hearing loss, postnasal drip, rhinorrhea, sinus pressure, sinus pain, sneezing, sore throat, tinnitus and voice change. Eyes: Negative for visual disturbance. Respiratory: Negative for apnea, cough, chest tightness and shortness of breath. Cardiovascular: Negative for chest pain, palpitations and leg swelling. Gastrointestinal: Negative for abdominal pain, blood in stool, constipation, diarrhea, nausea and vomiting. Endocrine: Negative for cold intolerance, heat intolerance, polydipsia, polyphagia and polyuria.    Genitourinary: Negative for decreased urine volume, difficulty urinating, dysuria, frequency and hematuria. Musculoskeletal: Positive for arthralgias. Negative for back pain, gait problem, joint swelling and myalgias. Skin: Negative for color change, rash and wound. Allergic/Immunologic: Negative for environmental allergies and food allergies. Neurological: Negative for dizziness, syncope, weakness, light-headedness, numbness and headaches. Hematological: Negative for adenopathy. Does not bruise/bleed easily. Psychiatric/Behavioral: Negative for sleep disturbance and suicidal ideas. The patient is not nervous/anxious.           /78 (BP Location: Left arm, Patient Position: Sitting, Cuff Size: Standard)   Pulse (!) 108   Temp (!) 96 °F (35.6 °C) (Tympanic)   Ht 6' 1" (1.854 m)   SpO2 94%   BMI 47.42 kg/m²   Social History     Socioeconomic History   • Marital status: /Civil Union     Spouse name: Not on file   • Number of children: Not on file   • Years of education: Not on file   • Highest education level: Not on file   Occupational History   • Occupation: employed   Tobacco Use   • Smoking status: Former     Packs/day: 0.25     Years: 30.00     Total pack years: 7.50     Types: Cigarettes     Start date: 200     Quit date: 2020     Years since quitting: 3.6   • Smokeless tobacco: Never   Vaping Use   • Vaping Use: Never used   Substance and Sexual Activity   • Alcohol use: Yes     Comment: socially   • Drug use: Never   • Sexual activity: Yes   Other Topics Concern   • Not on file   Social History Narrative   • Not on file     Social Determinants of Health     Financial Resource Strain: Not on file   Food Insecurity: Not on file   Transportation Needs: Not on file   Physical Activity: Not on file   Stress: Not on file   Social Connections: Not on file   Intimate Partner Violence: Not on file   Housing Stability: Not on file     Past Medical History:   Diagnosis Date   • Cough    • Cough    • Cough    • High cholesterol • Obesity, Class III, BMI 40-49.9 (morbid obesity) ([facility])    • SOB (shortness of breath)    • SOB (shortness of breath)    • SOB (shortness of breath)    • SOB (shortness of breath)    • SOB (shortness of breath)    • Wheezing    • Wheezing    • Wheezing      Family History   Problem Relation Age of Onset   • Drug abuse Mother    • Lung cancer Father      Past Surgical History:   Procedure Laterality Date   • APPENDECTOMY  26   • SHOULDER SURGERY  [id number]    several       Current Outpatient Medications:   •  albuterol (2.5 mg/3 mL) 0.083 % nebulizer solution, Take 3 mL (2.5 mg total) by nebulization every 6 (six) hours as needed for wheezing or shortness of breath, Disp: 120 mL, Rfl: 0  •  albuterol (PROVENTIL HFA,VENTOLIN HFA) 90 mcg/act inhaler, Inhale 2 puffs every 6 (six) hours as needed for wheezing or shortness of breath, Disp: 18 g, Rfl: 2  •  fluticasone-umeclidinium-vilanterol (Trelegy Ellipta) 200-62.5-25 mcg/actuation AEPB inhaler, Inhale 1 puff daily Rinse mouth after use., Disp: 56 blister, Rfl: 0  •  meloxicam (Mobic) 15 mg tablet, Take 1 tablet (15 mg total) by mouth daily, Disp: 30 tablet, Rfl: 3  •  metFORMIN (GLUCOPHAGE) 500 mg tablet, TAKE 1 TABLET BY MOUTH TWICE  DAILY WITH MEALS, Disp: 180 tablet, Rfl: 0  •  rosuvastatin (CRESTOR) 5 mg tablet, TAKE 1 TABLET BY MOUTH DAILY, Disp: 90 tablet, Rfl: 0  •  tirzepatide (Mounjaro) 7.5 MG/0.5ML, Inject 0.5 mL (7.5 mg total) under the skin every 7 days, Disp: 2 mL, Rfl: 0  •  Trelegy Ellipta 200-62.5-25 MCG/ACT AEPB inhaler, USE 1 INHALATION BY MOUTH  ONCE DAILY AT THE SAME TIME EACH DAY RINSE MOUTH AFTER  USE, Disp: 180 each, Rfl: 1  •  fluticasone (FLONASE) 50 mcg/act nasal spray, SPRAY 1 SPRAY INTO EACH NOSTRIL EVERY DAY (Patient not taking: Reported on 12/19/2022), Disp: 16 mL, Rfl: 3    No Known Allergies       Lab Review   Orders Only on 08/01/2023   Component Date Value   • Total Cholesterol 08/01/2023 194    • HDL 08/01/2023 39 (L)    • Triglycerides 08/01/2023 181 (H)    • LDL Calculated 08/01/2023 125 (H)    • Chol HDLC Ratio 08/01/2023 5.0 (H)    • Non-HDL Cholesterol 08/01/2023 155 (H)    • Glucose, Random 08/01/2023 94    • BUN 08/01/2023 16    • Creatinine 08/01/2023 1.12    • eGFR 08/01/2023 79    • SL AMB BUN/CREATININE RA* 08/01/2023 SEE NOTE:    • Sodium 08/01/2023 140    • Potassium 08/01/2023 4.5    • Chloride 08/01/2023 105    • CO2 08/01/2023 26    • Calcium 08/01/2023 9.8    • Protein, Total 08/01/2023 6.9    • Albumin 08/01/2023 4.0    • Globulin 08/01/2023 2.9    • Albumin/Globulin Ratio 08/01/2023 1.4    • TOTAL BILIRUBIN 08/01/2023 0.4    • Alkaline Phosphatase 08/01/2023 87    • AST 08/01/2023 16    • ALT 08/01/2023 24    • Color UA 08/01/2023 YELLOW    • Urine Appearance 08/01/2023 CLEAR    • Specific Gravity 08/01/2023 1.017    • Ph 08/01/2023 5.5    • Glucose, Urine 08/01/2023 NEGATIVE    • Bilirubin, Urine 08/01/2023 NEGATIVE    • Ketone, Urine 08/01/2023 NEGATIVE    • Blood, Urine 08/01/2023 NEGATIVE    • Protein, Urine 08/01/2023 NEGATIVE    • Nitrites Urine 08/01/2023 NEGATIVE    • Leukocyte Esterase 08/01/2023 TRACE (A)    • SL AMB WBC, URINE 08/01/2023 0-5    • RBC, Urine 08/01/2023 NONE SEEN    • Squamous Epithelial Cells 08/01/2023 0-5    • Bacteria, UA 08/01/2023 NONE SEEN    • Hyaline Casts 08/01/2023 NONE SEEN    • Comment(s) 08/01/2023     • White Blood Cell Count 08/01/2023 10.6    • Red Blood Cell Count 08/01/2023 5.78    • Hemoglobin 08/01/2023 17.9 (H)    • HCT 08/01/2023 53.3 (H)    • MCV 08/01/2023 92.2    • MCH 08/01/2023 31.0    • MCHC 08/01/2023 33.6    • RDW 08/01/2023 13.8    • Platelet Count 48/95/5950 217    • SL AMB MPV 08/01/2023 11.1    • Neutrophils (Absolute) 08/01/2023 6,954    • Lymphocytes (Absolute) 08/01/2023 2,449    • Monocytes (Absolute) 08/01/2023 869    • Eosinophils (Absolute) 08/01/2023 233    • Basophils ABS 08/01/2023 95    • Neutrophils 08/01/2023 65.6    • Lymphocytes 08/01/2023 23.1    • Monocytes 08/01/2023 8.2    • Eosinophils 08/01/2023 2.2    • Basophils PCT 08/01/2023 0.9    • TSH 08/01/2023 2.06    • Vitamin D, 25-Hydroxy, S* 08/01/2023 27 (L)    • Urine Culture, Comprehen* 08/01/2023     • Urine Culture Result 08/01/2023          Imaging: XR spine lumbar minimum 4 views non injury    Result Date: 8/8/2023  Narrative: LUMBAR SPINE INDICATION:   M54.50: Low back pain, unspecified. COMPARISON:  None VIEWS:  XR SPINE LUMBAR MINIMUM 4 VIEWS NON INJURY Images: 5 FINDINGS: There are 5 non rib bearing lumbar vertebral bodies. There is no evidence of acute fracture or destructive osseous lesion. There is chronic curvature of the lumbar spine The space narrowing at L5-S1 Spondylotic changes at L4-5 , L3-4 along with facet degenerative changes Mild disc space narrowing at L3-L4 and L4 to The pedicles appear intact. Soft tissues are unremarkable. Impression: Degenerative disc disease at L3-4, L4-L5 and L5-S1 along with spondylotic changes No acute compression collapse of the vetebra Workstation performed: [id number]     XR elbow 2 vw left    Result Date: 8/8/2023  Narrative: LEFT ELBOW INDICATION:   M70.22: Olecranon bursitis, left elbow. COMPARISON:  None VIEWS:  XR ELBOW 2 VW LEFT Images: 2 FINDINGS: There is no acute fracture or dislocation. There is no joint effusion. No significant degenerative changes. No lytic or blastic osseous lesion. Soft tissue swelling in the region of the olecranon bursa    Impression: No acute displaced fracture or dislocation No soft tissue calcification, erosive changes Soft tissue swelling in the region of the olecranon bursa at the posterior aspect of the elbow/proximal Workstation performed: [id number]       Objective:     Physical Exam  Constitutional:       General: He is not in acute distress. Appearance: He is well-developed. He is obese. He is not ill-appearing or toxic-appearing. HENT:      Head: Normocephalic and atraumatic.    Cardiovascular: Rate and Rhythm: Normal rate. Pulses: Normal pulses. Pulmonary:      Effort: Pulmonary effort is normal.   Musculoskeletal:         General: Normal range of motion. Cervical back: Normal range of motion. Comments: Left elbow , from   Mild / swelling bursa , neg tender, neg wound erythema    Skin:     General: Skin is warm and dry. Neurological:      Mental Status: He is alert and oriented to person, place, and time. Deep Tendon Reflexes: Reflexes are normal and symmetric. Psychiatric:         Behavior: Behavior normal.         Thought Content: Thought content normal.         Judgment: Judgment normal.             Skin tag removal    Date/Time: 8/10/2023 7:40 AM    Performed by: WES Brooks  Authorized by: WES Brooks  Universal Protocol:  Consent: Verbal consent obtained. Consent given by: patient  Patient understanding: patient states understanding of the procedure being performed  Patient identity confirmed: verbally with patient        Procedure Details - Skin Tag Destruction:     Up to 15      Body area:  Trunk    Trunk location:  Chest    Destruction method: scissors used for extraction    Lesion 2:     Body area:  Trunk    Trunk location:  R axilla    Destruction method: scissors used for extraction    Lesion 3:     Body area:  Trunk    Trunk location:  R axilla    Destruction method: scissors used for extraction    Lesion 6:      Tolerated , bleeding controlled via sorb dsg / bandaid   Wound care instructions given ,       There are no Patient Instructions on file for this visit. WES Brooks    Portions of the record may have been created with voice recognition software. Occasional wrong word or "sound a like" substitutions may have occurred due to the inherent limitations of voice recognition software. Read the chart carefully and recognize, using context, where substitutions have occurred.

## 2023-08-13 DIAGNOSIS — M54.50 LOW BACK PAIN WITHOUT SCIATICA, UNSPECIFIED BACK PAIN LATERALITY, UNSPECIFIED CHRONICITY: ICD-10-CM

## 2023-08-13 DIAGNOSIS — E66.01 CLASS 3 SEVERE OBESITY DUE TO EXCESS CALORIES WITHOUT SERIOUS COMORBIDITY WITH BODY MASS INDEX (BMI) OF 45.0 TO 49.9 IN ADULT (HCC): ICD-10-CM

## 2023-08-13 DIAGNOSIS — E78.5 HYPERLIPIDEMIA, UNSPECIFIED HYPERLIPIDEMIA TYPE: ICD-10-CM

## 2023-08-13 DIAGNOSIS — E88.81 INSULIN RESISTANCE: ICD-10-CM

## 2023-08-14 DIAGNOSIS — J45.40 MODERATE PERSISTENT ASTHMA WITHOUT COMPLICATION: Primary | ICD-10-CM

## 2023-08-14 NOTE — TELEPHONE ENCOUNTER
Call placed to the patient per [facility] referral.     V/M left for patient to call back. Phone number and hours of business provided.      This is the 3rd attempt to reach the patient. Will close referral per protocol.

## 2023-08-15 RX ORDER — MELOXICAM 15 MG/1
15 TABLET ORAL DAILY
Qty: 30 TABLET | Refills: 0 | Status: SHIPPED | OUTPATIENT
Start: 2023-08-15

## 2023-08-15 RX ORDER — ROSUVASTATIN CALCIUM 5 MG/1
5 TABLET, COATED ORAL DAILY
Qty: 90 TABLET | Refills: 0 | Status: SHIPPED | OUTPATIENT
Start: 2023-08-15

## 2023-08-21 ENCOUNTER — TELEMEDICINE (OUTPATIENT)
Dept: BEHAVIORAL/MENTAL HEALTH CLINIC | Facility: CLINIC | Age: 53
End: 2023-08-21
Payer: COMMERCIAL

## 2023-08-21 DIAGNOSIS — F41.9 ANXIETY: Primary | ICD-10-CM

## 2023-08-21 PROCEDURE — 90834 PSYTX W PT 45 MINUTES: CPT | Performed by: SOCIAL WORKER

## 2023-08-22 NOTE — PSYCH
Visit start and stop times:    08/21/23  Start Time: 1030  Stop Time: 1115  Total Visit Time: 45 minutes  Virtual Regular Visit  Therapist met w/pt who is a 48year old male for initial session. Pt stated that he has never done therapy before but has been struggling a lot emotionally for the past few years since [place]. Pt stated that he has been having an increase in symptoms such as: excessive worry, nightmares, heart palpitations, sweaty palms. 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 Therapist and pt discussed how he tends to internalize things and now has had symptoms that have manifested physically. Therapist continued to process pt's feelings with him and begin to identify healthy ways to help him cope with the present symptoms. Verification of patient location:    Patient is located at Home in the following state in which I hold an active license [place]      Assessment/Plan: f/u in one week    Problem List Items Addressed This Visit        Other    Anxiety - Primary          Reason for visit is No chief complaint on file. Encounter provider Zack Singletary    Provider located at [address]      Recent Visits  Date Type Provider Dept   08/21/23 Telemedicine [address] [place] [address] recent visits within past 7 days and meeting all other requirements  Future Appointments  No visits were found meeting these conditions. Showing future appointments within next 150 days and meeting all other requirements       The patient was identified by name and date of birth. Cecy Jacksonjoe was informed that this is a telemedicine visit and [facility]. He agrees to proceed. .  My office door was closed. No one else was in the room. He acknowledged consent and understanding of privacy and security of the video platform. The patient has agreed to participate and understands they can discontinue the visit at any time. Patient is aware this is a billable service. Syed Muñoz is a 48 y.o. male  . HPI     Past Medical History:   Diagnosis Date   • Cough    • Cough    • Cough    • High cholesterol    • Obesity, Class III, BMI 40-49.9 (morbid obesity) ([facility])    • SOB (shortness of breath)    • SOB (shortness of breath)    • SOB (shortness of breath)    • SOB (shortness of breath)    • SOB (shortness of breath)    • Wheezing    • Wheezing    • Wheezing        Past Surgical History:   Procedure Laterality Date   • APPENDECTOMY  1987   • SHOULDER SURGERY  [id number]    several       Current Outpatient Medications   Medication Sig Dispense Refill   • albuterol (2.5 mg/3 mL) 0.083 % nebulizer solution Take 3 mL (2.5 mg total) by nebulization every 6 (six) hours as needed for wheezing or shortness of breath 120 mL 0   • albuterol (PROVENTIL HFA,VENTOLIN HFA) 90 mcg/act inhaler Inhale 2 puffs every 6 (six) hours as needed for wheezing or shortness of breath 18 g 2   • fluticasone (FLONASE) 50 mcg/act nasal spray SPRAY 1 SPRAY INTO EACH NOSTRIL EVERY DAY (Patient not taking: Reported on 12/19/2022) 16 mL 3   • fluticasone-umeclidinium-vilanterol (Trelegy Ellipta) 200-62.5-25 mcg/actuation AEPB inhaler Inhale 1 puff daily Rinse mouth after use.  180 blister 0   • meloxicam (Mobic) 15 mg tablet Take 1 tablet (15 mg total) by mouth daily 30 tablet 0   • metFORMIN (GLUCOPHAGE) 500 mg tablet Take 1 tablet (500 mg total) by mouth 2 (two) times a day with meals 180 tablet 0   • rosuvastatin (CRESTOR) 5 mg tablet Take 1 tablet (5 mg total) by mouth daily 90 tablet 0   • tirzepatide (Mounjaro) 7.5 MG/0.5ML Inject 0.5 mL (7.5 mg total) under the skin every 7 days 2 mL 0     No current facility-administered medications for this visit. No Known Allergies    Review of Systems    Video Exam    There were no vitals filed for this visit.     Physical Exam  Pt denies any SI/HI/Ah/Vh

## 2023-08-26 DIAGNOSIS — R73.03 PREDIABETES: ICD-10-CM

## 2023-08-28 ENCOUNTER — TELEMEDICINE (OUTPATIENT)
Dept: BEHAVIORAL/MENTAL HEALTH CLINIC | Facility: CLINIC | Age: 53
End: 2023-08-28
Payer: COMMERCIAL

## 2023-08-28 DIAGNOSIS — R73.03 PREDIABETES: ICD-10-CM

## 2023-08-28 DIAGNOSIS — F41.9 ANXIETY: Primary | ICD-10-CM

## 2023-08-28 PROCEDURE — 90832 PSYTX W PT 30 MINUTES: CPT | Performed by: SOCIAL WORKER

## 2023-08-28 RX ORDER — TIRZEPATIDE 7.5 MG/.5ML
INJECTION, SOLUTION SUBCUTANEOUS
Qty: 2 ML | Refills: 1 | Status: SHIPPED | OUTPATIENT
Start: 2023-08-28 | End: 2023-08-29

## 2023-08-29 RX ORDER — TIRZEPATIDE 7.5 MG/.5ML
INJECTION, SOLUTION SUBCUTANEOUS
Refills: 1 | OUTPATIENT
Start: 2023-08-29

## 2023-08-29 NOTE — TELEPHONE ENCOUNTER
Rx sent to [facility] because [facility] declined to fill [place] Pregnancy And Lactation Text: The risk during pregnancy and breastfeeding is uncertain with this medication.

## 2023-08-29 NOTE — PSYCH
Visit start and stop times:    08/28/23  Start Time: 1040  Stop Time: 1114  Total Visit Time: 34 minutes  Virtual Regular Visit  Therapist met w/pt for individual session. pts symptoms include: irritability, mood swings, sadness, anxiety, racing thoughts. Therapist and pt continued to discuss different traumas that pt has seen over the years and how he needs to continue to work on letting go of everything rather than internalizing it. Pt stated he feels a lot of relief talking about things and wishes he did this sooner. Therapist continued to use CBT strategies to help pt challenge his anxious thoughts. Verification of patient location:    Patient is located at Home in the following state in which I hold an active license [place]      Assessment/Plan: f/u in two weeks    Problem List Items Addressed This Visit        Other    Anxiety - Primary              Reason for visit is No chief complaint on file. Encounter provider Ramiro Patel    Provider located at [address]      Recent Visits  Date Type Provider Dept   08/28/23 Telemedicine [address] [place] [address] recent visits within past 7 days and meeting all other requirements  Future Appointments  No visits were found meeting these conditions. Showing future appointments within next 150 days and meeting all other requirements       The patient was identified by name and date of birth. Justina Portillo was informed that this is a telemedicine visit and [facility] He agrees to proceed. .  My office door was closed. No one else was in the room. He acknowledged consent and understanding of privacy and security of the video platform.  The patient has agreed to participate and understands they can discontinue the visit at any time. Patient is aware this is a billable service. Syed Muñzo is a 48 y.o. male  . HPI     Past Medical History:   Diagnosis Date   • Cough    • Cough    • Cough    • High cholesterol    • Obesity, Class III, BMI 40-49.9 (morbid obesity) ([facility])    • SOB (shortness of breath)    • SOB (shortness of breath)    • SOB (shortness of breath)    • SOB (shortness of breath)    • SOB (shortness of breath)    • Wheezing    • Wheezing    • Wheezing        Past Surgical History:   Procedure Laterality Date   • APPENDECTOMY  1987   • SHOULDER SURGERY  [id number]    several       Current Outpatient Medications   Medication Sig Dispense Refill   • albuterol (2.5 mg/3 mL) 0.083 % nebulizer solution Take 3 mL (2.5 mg total) by nebulization every 6 (six) hours as needed for wheezing or shortness of breath 120 mL 0   • albuterol (PROVENTIL HFA,VENTOLIN HFA) 90 mcg/act inhaler Inhale 2 puffs every 6 (six) hours as needed for wheezing or shortness of breath 18 g 2   • fluticasone (FLONASE) 50 mcg/act nasal spray SPRAY 1 SPRAY INTO EACH NOSTRIL EVERY DAY (Patient not taking: Reported on 12/19/2022) 16 mL 3   • fluticasone-umeclidinium-vilanterol (Trelegy Ellipta) 200-62.5-25 mcg/actuation AEPB inhaler Inhale 1 puff daily Rinse mouth after use. 180 blister 0   • meloxicam (Mobic) 15 mg tablet Take 1 tablet (15 mg total) by mouth daily 30 tablet 0   • metFORMIN (GLUCOPHAGE) 500 mg tablet Take 1 tablet (500 mg total) by mouth 2 (two) times a day with meals 180 tablet 0   • Mounjaro 7.5 MG/0.5ML INJECT 0.5 ML (7.5 MG TOTAL) UNDER THE SKIN EVERY 7 DAYS 2 mL 1   • rosuvastatin (CRESTOR) 5 mg tablet Take 1 tablet (5 mg total) by mouth daily 90 tablet 0     No current facility-administered medications for this visit. No Known Allergies    Review of Systems    Video Exam    There were no vitals filed for this visit.     Physical Exam  Pt denied any SI/Hi/AH/VH

## 2023-08-31 ENCOUNTER — CLINICAL SUPPORT (OUTPATIENT)
Dept: BARIATRICS | Facility: CLINIC | Age: 53
End: 2023-08-31

## 2023-08-31 ENCOUNTER — TELEPHONE (OUTPATIENT)
Dept: BARIATRICS | Facility: CLINIC | Age: 53
End: 2023-08-31

## 2023-08-31 VITALS
OXYGEN SATURATION: 95 % | HEART RATE: 103 BPM | DIASTOLIC BLOOD PRESSURE: 66 MMHG | WEIGHT: 315 LBS | BODY MASS INDEX: 41.75 KG/M2 | SYSTOLIC BLOOD PRESSURE: 100 MMHG | HEIGHT: 73 IN | RESPIRATION RATE: 18 BRPM

## 2023-08-31 DIAGNOSIS — E66.01 MORBID (SEVERE) OBESITY DUE TO EXCESS CALORIES (HCC): Primary | ICD-10-CM

## 2023-08-31 PROCEDURE — RECHECK

## 2023-08-31 NOTE — TELEPHONE ENCOUNTER
Good Day Dr. Gavino Morgan,    Patient here today for weight check. Weighed in at 352 pounds. Currently taking Mounjaro 5mg and states having no side effects while taking. Patient requests refill. 5/17/2023   0959 6/7/2023   1016 7/12/2023   1004    BP: 110/76 120/78 120/84    Weight: 168 kg (370 lb) Abnormal  164 kg (362 lb 3.2 oz) Abnormal  163 kg (359 lb 6.4 oz) Abnormal       Has had successful progress taking tirzepatide [employer] 5 MG/0.5ML     Thank you.

## 2023-08-31 NOTE — PROGRESS NOTES
Patient here today for weight check. Weighed in at 352 pounds. Currently taking Mounjaro 7.5mg and states having no side effects while taking. Patient requests refill.      5/17/2023   0959 6/7/2023   1016 7/12/2023   1004    BP: 110/76 120/78 120/84    Weight: 168 kg (370 lb) Abnormal  164 kg (362 lb 3.2 oz) Abnormal  163 kg (359 lb 6.4 oz) Abnormal       Has had successful progress taking tirzepatide (Mounjaro) 7.5 MG/0.5ML

## 2023-08-31 NOTE — TELEPHONE ENCOUNTER
Good Day Dr. Tyler Matters,     Patient calls office for affimation of medication dosing. I have addended Weight Check visit note. States currently on 7.5mg Mounjaro. Thank you.

## 2023-09-01 ENCOUNTER — OFFICE VISIT (OUTPATIENT)
Dept: OBGYN CLINIC | Facility: CLINIC | Age: 53
End: 2023-09-01
Payer: COMMERCIAL

## 2023-09-01 VITALS
BODY MASS INDEX: 41.75 KG/M2 | HEIGHT: 73 IN | DIASTOLIC BLOOD PRESSURE: 87 MMHG | SYSTOLIC BLOOD PRESSURE: 125 MMHG | WEIGHT: 315 LBS | HEART RATE: 93 BPM

## 2023-09-01 DIAGNOSIS — M70.22 OLECRANON BURSITIS, LEFT ELBOW: ICD-10-CM

## 2023-09-01 PROCEDURE — 20605 DRAIN/INJ JOINT/BURSA W/O US: CPT | Performed by: FAMILY MEDICINE

## 2023-09-01 PROCEDURE — 99203 OFFICE O/P NEW LOW 30 MIN: CPT | Performed by: FAMILY MEDICINE

## 2023-09-01 RX ORDER — TRIAMCINOLONE ACETONIDE 40 MG/ML
40 INJECTION, SUSPENSION INTRA-ARTICULAR; INTRAMUSCULAR
Status: COMPLETED | OUTPATIENT
Start: 2023-09-01 | End: 2023-09-01

## 2023-09-01 RX ORDER — BUPIVACAINE HYDROCHLORIDE 2.5 MG/ML
2 INJECTION, SOLUTION INFILTRATION; PERINEURAL
Status: COMPLETED | OUTPATIENT
Start: 2023-09-01 | End: 2023-09-01

## 2023-09-01 RX ADMIN — BUPIVACAINE HYDROCHLORIDE 2 ML: 2.5 INJECTION, SOLUTION INFILTRATION; PERINEURAL at 08:30

## 2023-09-01 RX ADMIN — TRIAMCINOLONE ACETONIDE 40 MG: 40 INJECTION, SUSPENSION INTRA-ARTICULAR; INTRAMUSCULAR at 08:30

## 2023-09-01 NOTE — PROGRESS NOTES
Assessment/Plan:  Assessment/Plan   Diagnoses and all orders for this visit:    Olecranon bursitis, left elbow  Comments:  discussed cuasative factors , plan of care , evalwith sports med   Orders:  -     Ambulatory Referral to Sports Medicine  -     Medium joint arthrocentesis: L olecranon bursa      54-year-old right-hand-dominant male with left elbow pain and swelling few months duration. Discussed with patient physical exam, radiographs, impression, and plan. X-rays left elbow unremarkable for acute osseous abnormality. Physical exam left elbow noted for olecranon soft tissue swelling. There is no bony or soft tissue tenderness about the elbow. He has intact range of motion and strength of the elbow and wrist and digits of the hand. Tinel's testing is unremarkable. He is intact neurovascularly. Clinical impression is olecranon bursitis. I discussed treatment regimen of aspiration and steroid administration, supplements, and protection. I aspirated 9 cc of serosanguineous fluid and administered mixture 1 cc 0.25% bupivacaine and 1 cc Kenalog without complication to the olecranon bursa. He is to take turmeric at least 1000 mg daily and tart cherry at least 1000 mg daily, glucosamine 2-3 times a day. He is to wear an elbow pad. He will follow-up as needed. Subjective:   Patient ID: Oneil Child is a 48 y.o. male. Chief Complaint   Patient presents with   • Left Elbow - Pain       54-year-old right-hand-dominant male presents valuation left elbow pain and swelling few months duration. Reports onset of symptoms shortly after a fall injury. He has pain described as gradual in onset, localized to the posterior aspect the elbow, nonradiating, worse direct pressure/leaning on the elbow, associated with swelling, and improved with resting. He was seen by primary care provider and suspected for olecranon bursitis.   He was prescribed meloxicam for back issue, referred for x-rays of the left elbow, and referred to sports medicine. He denies any numbness or tingling or weakness. He states that when he is not leaning on elbow there is no pain. Elbow Pain  This is a new problem. The current episode started more than 1 month ago. The problem occurs daily. The problem has been unchanged. Associated symptoms include arthralgias and joint swelling. Pertinent negatives include no abdominal pain, chest pain, chills, fever, numbness, rash, sore throat or weakness. Exacerbated by: Direct pressure. He has tried position changes and NSAIDs for the symptoms. The treatment provided mild relief. The following portions of the patient's history were reviewed and updated as appropriate: He  has a past medical history of Cough, Cough, Cough, High cholesterol, Obesity, Class III, BMI 40-49.9 (morbid obesity) ([facility]), SOB (shortness of breath), SOB (shortness of breath), SOB (shortness of breath), SOB (shortness of breath), SOB (shortness of breath), Wheezing, Wheezing, and Wheezing. He has No Known Allergies. .    Review of Systems   Constitutional: Negative for chills and fever. HENT: Negative for sore throat. Eyes: Negative for visual disturbance. Respiratory: Negative for shortness of breath. Cardiovascular: Negative for chest pain. Gastrointestinal: Negative for abdominal pain. Genitourinary: Negative for flank pain. Musculoskeletal: Positive for arthralgias and joint swelling. Skin: Negative for rash and wound. Neurological: Negative for weakness and numbness. Hematological: Does not bruise/bleed easily. Psychiatric/Behavioral: Negative for self-injury. Objective:  Vitals:    09/01/23 0840   BP: 125/87   Pulse: 93   Weight: (!) 160 kg (352 lb)   Height: 6' 1" (1.854 m)     Left Hand Exam     Muscle Strength   The patient has normal left wrist strength.     Tests   Finkelstein's test: negative    Other   Sensation: normal  Pulse: present      Left Elbow Exam     Tenderness   The patient is experiencing no tenderness. Range of Motion   The patient has normal left elbow ROM. Muscle Strength   The patient has normal left elbow strength (5/5 flexion and extension). Tests   Varus: negative  Valgus: negative  Tinel's sign (cubital tunnel): negative    Other   Sensation: normal          Tenderness     Left Wrist/Hand   No tenderness in the distal biceps tendon, distal triceps tendon, lateral epicondyle, medial epicondyle and olecranon process. Strength/Myotome Testing     Left Wrist/Hand   Normal wrist strength    Tests     Left Wrist/Hand   Negative Finkelstein's. Physical Exam  Vitals and nursing note reviewed. Constitutional:       General: He is not in acute distress. Appearance: He is well-developed. He is not ill-appearing or diaphoretic. HENT:      Head: Normocephalic and atraumatic. Right Ear: External ear normal.      Left Ear: External ear normal.   Eyes:      Conjunctiva/sclera: Conjunctivae normal.   Neck:      Trachea: No tracheal deviation. Cardiovascular:      Rate and Rhythm: Normal rate. Pulmonary:      Effort: Pulmonary effort is normal. No respiratory distress. Abdominal:      General: There is no distension. Musculoskeletal:         General: Swelling present. No tenderness, deformity or signs of injury. Left elbow: No medial epicondyle, lateral epicondyle or olecranon process tenderness. Skin:     General: Skin is warm and dry. Coloration: Skin is not jaundiced or pale. Neurological:      Mental Status: He is alert and oriented to person, place, and time. Psychiatric:         Mood and Affect: Mood normal.         Behavior: Behavior normal.         Thought Content: Thought content normal.         Judgment: Judgment normal.           I have personally reviewed pertinent films in PACS and my interpretation is No acute osseous abnormality of the left elbow.     Medium joint arthrocentesis: L olecranon bursa  [place] Protocol:  Consent: Verbal consent obtained. Risks and benefits: risks, benefits and alternatives were discussed  Consent given by: patient  Time out: Immediately prior to procedure a "time out" was called to verify the correct patient, procedure, equipment, support staff and site/side marked as required. Patient understanding: patient states understanding of the procedure being performed  Patient consent: the patient's understanding of the procedure matches consent given  Procedure consent: procedure consent matches procedure scheduled  Relevant documents: relevant documents present and verified  Test results: test results available and properly labeled  Site marked: the operative site was marked  Radiology Images displayed and confirmed.  If images not available, report reviewed: imaging studies available  Required items: required blood products, implants, devices, and special equipment available  Patient identity confirmed: verbally with patient    Supporting Documentation  Indications: pain   Procedure Details  Location: elbow - L olecranon bursa  Preparation: Patient was prepped and draped in the usual sterile fashion  Needle size: 18 G  Ultrasound guidance: no  Approach: anterolateral  Medications administered: 2 mL bupivacaine 0.25 %; 40 mg triamcinolone acetonide 40 mg/mL    Aspirate amount: 9 mL  Aspirate: blood-tinged    Patient tolerance: patient tolerated the procedure well with no immediate complications  Dressing:  Sterile dressing applied

## 2023-09-01 NOTE — LETTER
September 1, 2023     Nancy Anderson, [address]    Patient: Cheyenne Morel   YOB: 1970   Date of Visit: 9/1/2023       Dear Dr. Davi Pelaez: Thank you for referring Cheyenne Morel to me for evaluation. Below are my notes for this consultation. If you have questions, please do not hesitate to call me. I look forward to following your patient along with you. Sincerely,        Jens Basilio DO        CC: No Recipients    SHAISTA Randolph, DO  9/1/2023 11:14 AM  Sign when Signing Visit  Assessment/Plan:  Assessment/Plan   Diagnoses and all orders for this visit:    Olecranon bursitis, left elbow  Comments:  discussed cuasative factors , plan of care , evalwith sports med   Orders:  -     Ambulatory Referral to Sports Medicine  -     Medium joint arthrocentesis: L olecranon bursa      51-year-old right-hand-dominant male with left elbow pain and swelling few months duration. Discussed with patient physical exam, radiographs, impression, and plan. X-rays left elbow unremarkable for acute osseous abnormality. Physical exam left elbow noted for olecranon soft tissue swelling. There is no bony or soft tissue tenderness about the elbow. He has intact range of motion and strength of the elbow and wrist and digits of the hand. Tinel's testing is unremarkable. He is intact neurovascularly. Clinical impression is olecranon bursitis. I discussed treatment regimen of aspiration and steroid administration, supplements, and protection. I aspirated 9 cc of serosanguineous fluid and administered mixture 1 cc 0.25% bupivacaine and 1 cc Kenalog without complication to the olecranon bursa. He is to take turmeric at least 1000 mg daily and tart cherry at least 1000 mg daily, glucosamine 2-3 times a day. He is to wear an elbow pad. He will follow-up as needed. Subjective:   Patient ID: Cheyenne Morel is a 48 y.o. male.  Chief Complaint   Patient presents with   • Left Elbow - Pain       58-year-old right-hand-dominant male presents valuation left elbow pain and swelling few months duration. Reports onset of symptoms shortly after a fall injury. He has pain described as gradual in onset, localized to the posterior aspect the elbow, nonradiating, worse direct pressure/leaning on the elbow, associated with swelling, and improved with resting. He was seen by primary care provider and suspected for olecranon bursitis. He was prescribed meloxicam for back issue, referred for x-rays of the left elbow, and referred to sports medicine. He denies any numbness or tingling or weakness. He states that when he is not leaning on elbow there is no pain. Elbow Pain  This is a new problem. The current episode started more than 1 month ago. The problem occurs daily. The problem has been unchanged. Associated symptoms include arthralgias and joint swelling. Pertinent negatives include no abdominal pain, chest pain, chills, fever, numbness, rash, sore throat or weakness. Exacerbated by: Direct pressure. He has tried position changes and NSAIDs for the symptoms. The treatment provided mild relief. The following portions of the patient's history were reviewed and updated as appropriate: He  has a past medical history of Cough, Cough, Cough, High cholesterol, Obesity, Class III, BMI 40-49.9 (morbid obesity) ([facility]), SOB (shortness of breath), SOB (shortness of breath), SOB (shortness of breath), SOB (shortness of breath), SOB (shortness of breath), Wheezing, Wheezing, and Wheezing. He has No Known Allergies. .    Review of Systems   Constitutional: Negative for chills and fever. HENT: Negative for sore throat. Eyes: Negative for visual disturbance. Respiratory: Negative for shortness of breath. Cardiovascular: Negative for chest pain. Gastrointestinal: Negative for abdominal pain.    Genitourinary: Negative for flank pain.   Musculoskeletal: Positive for arthralgias and joint swelling. Skin: Negative for rash and wound. Neurological: Negative for weakness and numbness. Hematological: Does not bruise/bleed easily. Psychiatric/Behavioral: Negative for self-injury. Objective:  Vitals:    09/01/23 0840   BP: 125/87   Pulse: 93   Weight: (!) 160 kg (352 lb)   Height: 6' 1" (1.854 m)     Left Hand Exam     Muscle Strength   The patient has normal left wrist strength. Tests   Finkelstein's test: negative    Other   Sensation: normal  Pulse: present      Left Elbow Exam     Tenderness   The patient is experiencing no tenderness. Range of Motion   The patient has normal left elbow ROM. Muscle Strength   The patient has normal left elbow strength (5/5 flexion and extension). Tests   Varus: negative  Valgus: negative  Tinel's sign (cubital tunnel): negative    Other   Sensation: normal          Tenderness     Left Wrist/Hand   No tenderness in the distal biceps tendon, distal triceps tendon, lateral epicondyle, medial epicondyle and olecranon process. Strength/Myotome Testing     Left Wrist/Hand   Normal wrist strength    Tests     Left Wrist/Hand   Negative Finkelstein's. Physical Exam  Vitals and nursing note reviewed. Constitutional:       General: He is not in acute distress. Appearance: He is well-developed. He is not ill-appearing or diaphoretic. HENT:      Head: Normocephalic and atraumatic. Right Ear: External ear normal.      Left Ear: External ear normal.   Eyes:      Conjunctiva/sclera: Conjunctivae normal.   Neck:      Trachea: No tracheal deviation. Cardiovascular:      Rate and Rhythm: Normal rate. Pulmonary:      Effort: Pulmonary effort is normal. No respiratory distress. Abdominal:      General: There is no distension. Musculoskeletal:         General: Swelling present. No tenderness, deformity or signs of injury.       Left elbow: No medial epicondyle, lateral epicondyle or olecranon process tenderness. Skin:     General: Skin is warm and dry. Coloration: Skin is not jaundiced or pale. Neurological:      Mental Status: He is alert and oriented to person, place, and time. Psychiatric:         Mood and Affect: Mood normal.         Behavior: Behavior normal.         Thought Content: Thought content normal.         Judgment: Judgment normal.           I have personally reviewed pertinent films in PACS and my interpretation is No acute osseous abnormality of the left elbow. Medium joint arthrocentesis: L olecranon bursa  Universal Protocol:  Consent: Verbal consent obtained. Risks and benefits: risks, benefits and alternatives were discussed  Consent given by: patient  Time out: Immediately prior to procedure a "time out" was called to verify the correct patient, procedure, equipment, support staff and site/side marked as required. Patient understanding: patient states understanding of the procedure being performed  Patient consent: the patient's understanding of the procedure matches consent given  Procedure consent: procedure consent matches procedure scheduled  Relevant documents: relevant documents present and verified  Test results: test results available and properly labeled  Site marked: the operative site was marked  Radiology Images displayed and confirmed.  If images not available, report reviewed: imaging studies available  Required items: required blood products, implants, devices, and special equipment available  Patient identity confirmed: verbally with patient    Supporting Documentation  Indications: pain   Procedure Details  Location: elbow - L olecranon bursa  Preparation: Patient was prepped and draped in the usual sterile fashion  Needle size: 18 G  Ultrasound guidance: no  Approach: anterolateral  Medications administered: 2 mL bupivacaine 0.25 %; 40 mg triamcinolone acetonide 40 mg/mL    Aspirate amount: 9 mL  Aspirate: blood-tinged    Patient tolerance: patient tolerated the procedure well with no immediate complications  Dressing:  Sterile dressing applied

## 2023-09-11 ENCOUNTER — TELEMEDICINE (OUTPATIENT)
Dept: BEHAVIORAL/MENTAL HEALTH CLINIC | Facility: CLINIC | Age: 53
End: 2023-09-11
Payer: COMMERCIAL

## 2023-09-11 DIAGNOSIS — F41.9 ANXIETY: Primary | ICD-10-CM

## 2023-09-11 PROCEDURE — 90834 PSYTX W PT 45 MINUTES: CPT | Performed by: SOCIAL WORKER

## 2023-09-15 ENCOUNTER — TELEPHONE (OUTPATIENT)
Dept: FAMILY MEDICINE CLINIC | Facility: CLINIC | Age: 53
End: 2023-09-15

## 2023-09-15 NOTE — PSYCH
Virtual Regular Visit   Therapist met w/pt for individual session. Pt stated that his mood continues to be up and down. He stated that he was out to eat w/a friend and a siren sound happened and he went "blank."  Pt sharaed that he didn't even realize he did that. He asked therapist what that meant. Therapist explained that that is a trauma respond due to what he has gone through. Therapist educated pt on what his body is doing as a way to help him cope with things he witnessed especially during [place]. Discussion was held around ways to help ground him when he does experience flashbacks or when he dissociates. Verification of patient location:    Patient is located at Home in the following state in which I hold an active license [place]      Assessment/Plan: f/u in one week    Problem List Items Addressed This Visit        Other    Anxiety - Primary            Reason for visit is No chief complaint on file. Encounter provider Shaheed Berry    Provider located at [address] [place] [address]      Recent Visits  Date Type Provider Dept   09/11/23 Telemedicine [address] [place] [address] recent visits within past 7 days and meeting all other requirements  Future Appointments  No visits were found meeting these conditions. Showing future appointments within next 150 days and meeting all other requirements       The patient was identified by name and date of birth. Mando Desouza was informed that this is a telemedicine visit and [facility] He agrees to proceed. .  My office door was closed. No one else was in the room. He acknowledged consent and understanding of privacy and security of the video platform.  The patient has agreed to participate and understands they can discontinue the visit at any time. Patient is aware this is a billable service. Syed Adhikari is a 48 y.o. male  . HPI     Past Medical History:   Diagnosis Date   • Cough    • Cough    • Cough    • High cholesterol    • Obesity, Class III, BMI 40-49.9 (morbid obesity) ([facility])    • SOB (shortness of breath)    • SOB (shortness of breath)    • SOB (shortness of breath)    • SOB (shortness of breath)    • SOB (shortness of breath)    • Wheezing    • Wheezing    • Wheezing        Past Surgical History:   Procedure Laterality Date   • APPENDECTOMY  1987   • SHOULDER SURGERY  [id number]    several       Current Outpatient Medications   Medication Sig Dispense Refill   • albuterol (2.5 mg/3 mL) 0.083 % nebulizer solution Take 3 mL (2.5 mg total) by nebulization every 6 (six) hours as needed for wheezing or shortness of breath 120 mL 0   • albuterol (PROVENTIL HFA,VENTOLIN HFA) 90 mcg/act inhaler Inhale 2 puffs every 6 (six) hours as needed for wheezing or shortness of breath 18 g 2   • fluticasone (FLONASE) 50 mcg/act nasal spray SPRAY 1 SPRAY INTO EACH NOSTRIL EVERY DAY (Patient not taking: Reported on 12/19/2022) 16 mL 3   • fluticasone-umeclidinium-vilanterol (Trelegy Ellipta) 200-62.5-25 mcg/actuation AEPB inhaler Inhale 1 puff daily Rinse mouth after use. 180 blister 0   • meloxicam (Mobic) 15 mg tablet Take 1 tablet (15 mg total) by mouth daily 30 tablet 0   • metFORMIN (GLUCOPHAGE) 500 mg tablet Take 1 tablet (500 mg total) by mouth 2 (two) times a day with meals 180 tablet 0   • rosuvastatin (CRESTOR) 5 mg tablet Take 1 tablet (5 mg total) by mouth daily 90 tablet 0   • tirzepatide 7.5 MG/0.5ML Inject 0.5 mL (7.5 mg total) under the skin every 7 days 2 mL 2     No current facility-administered medications for this visit. No Known Allergies    Review of Systems    Video Exam    There were no vitals filed for this visit.     Physical Exam     Visit start and stop times:    09/11/23  Start Time: 1100  Stop Time: 1145  Total Visit Time: 45 minutes

## 2023-09-18 ENCOUNTER — TELEMEDICINE (OUTPATIENT)
Dept: BEHAVIORAL/MENTAL HEALTH CLINIC | Facility: CLINIC | Age: 53
End: 2023-09-18
Payer: COMMERCIAL

## 2023-09-18 ENCOUNTER — TELEPHONE (OUTPATIENT)
Age: 53
End: 2023-09-18

## 2023-09-18 DIAGNOSIS — F41.9 ANXIETY: Primary | ICD-10-CM

## 2023-09-18 PROCEDURE — 90834 PSYTX W PT 45 MINUTES: CPT | Performed by: SOCIAL WORKER

## 2023-09-20 ENCOUNTER — TELEPHONE (OUTPATIENT)
Dept: PSYCHIATRY | Facility: CLINIC | Age: 53
End: 2023-09-20

## 2023-09-20 NOTE — TELEPHONE ENCOUNTER
Mathieu Schreiber from The offices of [facility] and [facility] contacted the office seeking a fax number to submit medical records request. Wendy García provided Mathieu Schreiber with the [facility] office fax number.

## 2023-09-20 NOTE — PSYCH
Visit start and stop times:    09/18/23  Start Time: 1030  Stop Time: 1115  Total Visit Time: 45 minutes  Virtual Regular Visit  Therapist met w/pt for individual session. Pt stated that he hasn't had any nightmares since last session but continues to struggle w/heightened symptoms. Pt stated that he feels a lot of anger/resentments as well as stress financially. Therapist and pt continued to process his feelings and healthy ways to navigate them. Verification of patient location:    Patient is located at Home in the following state in which I hold an active license [place]      Assessment/Plan: f/u in 2 weeks    Problem List Items Addressed This Visit        Other    Anxiety - Primary            Reason for visit is No chief complaint on file. Encounter provider Deidra Caldwell    Provider located at [facility] [address] [facility] [address] [place] [address]      Recent Visits  Date Type Provider Dept   09/18/23 3229 [facility]   09/15/23 Telephone [facility] [phone] [facility] recent visits within past 7 days and meeting all other requirements  Future Appointments  No visits were found meeting these conditions. Showing future appointments within next 150 days and meeting all other requirements       The patient was identified by name and date of birth. Oneil Child was informed that this is a telemedicine visit and [facility] He agrees to proceed. .  My office door was closed. No one else was in the room. He acknowledged consent and understanding of privacy and security of the video platform. The patient has agreed to participate and understands they can discontinue the visit at any time. Patient is aware this is a billable service. Syed Barney is a 48 y.o. male  . HPI     Past Medical History:   Diagnosis Date   • Cough    • Cough    • Cough    • High cholesterol    • Obesity, Class III, BMI 40-49.9 (morbid obesity) ([facility])    • SOB (shortness of breath)    • SOB (shortness of breath)    • SOB (shortness of breath)    • SOB (shortness of breath)    • SOB (shortness of breath)    • Wheezing    • Wheezing    • Wheezing        Past Surgical History:   Procedure Laterality Date   • APPENDECTOMY  1987   • SHOULDER SURGERY  [id number]    several       Current Outpatient Medications   Medication Sig Dispense Refill   • albuterol (2.5 mg/3 mL) 0.083 % nebulizer solution Take 3 mL (2.5 mg total) by nebulization every 6 (six) hours as needed for wheezing or shortness of breath 120 mL 0   • albuterol (PROVENTIL HFA,VENTOLIN HFA) 90 mcg/act inhaler Inhale 2 puffs every 6 (six) hours as needed for wheezing or shortness of breath 18 g 2   • fluticasone (FLONASE) 50 mcg/act nasal spray SPRAY 1 SPRAY INTO EACH NOSTRIL EVERY DAY (Patient not taking: Reported on 12/19/2022) 16 mL 3   • fluticasone-umeclidinium-vilanterol (Trelegy Ellipta) 200-62.5-25 mcg/actuation AEPB inhaler Inhale 1 puff daily Rinse mouth after use. 180 blister 0   • meloxicam (Mobic) 15 mg tablet Take 1 tablet (15 mg total) by mouth daily 30 tablet 0   • metFORMIN (GLUCOPHAGE) 500 mg tablet Take 1 tablet (500 mg total) by mouth 2 (two) times a day with meals 180 tablet 0   • rosuvastatin (CRESTOR) 5 mg tablet Take 1 tablet (5 mg total) by mouth daily 90 tablet 0   • tirzepatide 7.5 MG/0.5ML Inject 0.5 mL (7.5 mg total) under the skin every 7 days 2 mL 2     No current facility-administered medications for this visit. No Known Allergies    Review of Systems    Video Exam    There were no vitals filed for this visit.     Physical Exam Pt denied any SI/HI/Ah/Vh

## 2023-09-20 NOTE — TELEPHONE ENCOUNTER
Paperwork was received from [address] for medical records.      Forms were sent to provider to sign and received back to Fax to [facility]

## 2023-09-27 ENCOUNTER — TELEPHONE (OUTPATIENT)
Dept: BARIATRICS | Facility: CLINIC | Age: 53
End: 2023-09-27

## 2023-09-27 NOTE — TELEPHONE ENCOUNTER
----- Message from Aníbal Ross sent at 9/27/2023 12:33 PM EDT -----  Regarding: JOHANA: Meds  Contact: [phone]    ----- Message -----  From: Samia Juliennstein  Sent: 9/27/2023  11:53 AM EDT  To: #  Subject: Regine Records Dr. Shorty Gallego,  As you know, I continually have issues with [facility] my prescription service. They want to do a 90 day prescription. The cost for my prescription (Majourno) is $200.00 per refill. So if they fill 3 months it’s the same cost as one month. This is very expensive for me. I know my next increase will be 20mg and I have been losing weight. Will you please consider filling a 3 month prescription as it will reduce my cost to $67/month from $200. Thank you!

## 2023-09-28 NOTE — TELEPHONE ENCOUNTER
Provider also received request for record release for Ankit Hebron 's at [employer].      Signed request has been scanned and faxed to [facility]

## 2023-09-29 DIAGNOSIS — E88.819 INSULIN RESISTANCE: ICD-10-CM

## 2023-09-29 DIAGNOSIS — E66.01 CLASS 3 SEVERE OBESITY DUE TO EXCESS CALORIES WITHOUT SERIOUS COMORBIDITY WITH BODY MASS INDEX (BMI) OF 45.0 TO 49.9 IN ADULT (HCC): ICD-10-CM

## 2023-09-29 DIAGNOSIS — E78.5 HYPERLIPIDEMIA, UNSPECIFIED HYPERLIPIDEMIA TYPE: ICD-10-CM

## 2023-09-29 RX ORDER — ROSUVASTATIN CALCIUM 5 MG/1
5 TABLET, COATED ORAL DAILY
Qty: 90 TABLET | Refills: 0 | Status: SHIPPED | OUTPATIENT
Start: 2023-09-29

## 2023-10-02 DIAGNOSIS — J45.40 MODERATE PERSISTENT ASTHMA WITHOUT COMPLICATION: Primary | ICD-10-CM

## 2023-10-04 ENCOUNTER — OFFICE VISIT (OUTPATIENT)
Dept: BARIATRICS | Facility: CLINIC | Age: 53
End: 2023-10-04
Payer: COMMERCIAL

## 2023-10-04 VITALS
SYSTOLIC BLOOD PRESSURE: 122 MMHG | DIASTOLIC BLOOD PRESSURE: 78 MMHG | BODY MASS INDEX: 41.75 KG/M2 | WEIGHT: 315 LBS | HEIGHT: 73 IN | HEART RATE: 110 BPM | RESPIRATION RATE: 16 BRPM

## 2023-10-04 DIAGNOSIS — R73.03 PREDIABETES: ICD-10-CM

## 2023-10-04 DIAGNOSIS — E66.01 OBESITY, CLASS III, BMI 40-49.9 (MORBID OBESITY) (HCC): Primary | ICD-10-CM

## 2023-10-04 DIAGNOSIS — Z91.89 AT RISK FOR SLEEP APNEA: ICD-10-CM

## 2023-10-04 PROCEDURE — 99214 OFFICE O/P EST MOD 30 MIN: CPT | Performed by: FAMILY MEDICINE

## 2023-10-04 NOTE — PROGRESS NOTES
Assessment/Plan:  Amrik Kee was seen today for follow-up. 1. Obesity, Class III, BMI 40-49.9 (morbid obesity) (HCC)  tirzepatide (Mounjaro) 10 MG/0.5ML      2. Prediabetes  tirzepatide (Mounjaro) 10 MG/0.5ML      3. At risk for sleep apnea            Diagnoses and all orders for this visit:  1. Obesity, Class III, BMI 40-49.9 (morbid obesity) (HCC)  contnue dietary changes  Lost 6.7%  rediscussed bariatric surgery - advised patient with surgery loses about 50% and with medical weight loss 10%- he declines for now  Diet plan continue: mediterranean diet 1800kcal    2. Prediabetes  Increase Mounjaro dose to 10mg   No  side effects  Discussed pancreatitis side effects , avoid fatty meals  Mixed hyperlipidemia   CV risk factors will decrease with weight loss  At risk for sleep apnea  Encouraged to schedule a sleep study  Encourage mindful eating, portion control, motivational interview performed to help patient reach goals   Patient denies personal and family history of  pancreatitis, thyroid cancer, MEN-2 tumors. Follow up in approximately 3 mo with me      Subjective:   Chief Complaint   Patient presents with   • Follow-up     Patient here to discuss weight associated problems and nutrition goals  HPI: Jovanny Gaitan  is a 48 y.o. male with excess weight/obesity here to pursue weight management.   Patient is pursuing Conservative Program.     Wt Readings from Last 10 Encounters:   10/04/23 (!) 158 kg (348 lb 3.2 oz)   09/01/23 (!) 160 kg (352 lb)   08/31/23 (!) 160 kg (352 lb 12.8 oz)   07/12/23 (!) 163 kg (359 lb 6.4 oz)   06/07/23 (!) 164 kg (362 lb 3.2 oz)   05/17/23 (!) 168 kg (370 lb)   05/10/23 (!) 172 kg (379 lb)   12/19/22 (!) 169 kg (373 lb)   11/14/22 (!) 163 kg (360 lb)   10/05/22 (!) 168 kg (371 lb)        Godfrey Nicely started 4/2021 but he was unable to f/u after 5/2021  Went down to 347 lbs on rybelsus but could not afford after being put on [place]  Was eating cheap food that was affordable- gained weight  Started Sahil enGreet 12/2022 - used it for one mo but did not follow up due to cost $200    OV :12/22 weight 373lbs started Mounjaro   Last OV  : 370lbs lost 3lbs  Current : 348lbs lost 6.7% Mounjaro at 7.5mg  Food logging: no  Increased appetite/cravings much less  Protein shake in am   Sometimes at lunch has protein shake  Dinner at home Eduard Alba is decreased in amount  Avoided white bread or pasta in months   Snacks: fruits, banana, apples   Exercise:mobility is improving . Does more walking  Hydration:1.5 gallon per day water  Alcohol: no          The following portions of the patient's history were reviewed and updated as appropriate: allergies, current medications, past family history, past medical history, past social history, past surgical history, and problem list.      Review of Systems   Constitutional: Negative for activity change. Fatigue  HENT: Negative for trouble swallowing. Respiratory: + for shortness of breath. Cardiovascular: Negative for chest pain, edema  Gastrointestinal: Negative for abdominal pain, nausea and vomiting, acid reflux, constipation/diarrhea  Psychiatric/Behavioral: Negative for behavioral problems , anxiety or depression    Objective:  /78 (BP Location: Left arm, Patient Position: Sitting, Cuff Size: Large)   Pulse (!) 110   Resp 16   Ht 6' 1.23" (1.86 m)   Wt (!) 158 kg (348 lb 3.2 oz)   BMI 45.65 kg/m²   Constitutional: Well-developed, well-nourished and Obese Body mass index is 45.65 kg/m². Harrel Maxim HEENT: No conjunctival injection. Pulmonary: No increased work of breathing or signs of respiratory distress. CV: Well-perfused, Regular rate and rhythm, no edema  GI: increased abdominal girth. Non-distended. Endo: no ophthalmopathy, no dermopathy, truncal obesity  MSK: no sarcopenia  Neuro: Oriented to person, place and time. Normal Speech. Normal gait. Psych: Normal affect and mood.  No delusion or hallucinations, normal thought process

## 2023-10-26 ENCOUNTER — TELEMEDICINE (OUTPATIENT)
Dept: BEHAVIORAL/MENTAL HEALTH CLINIC | Facility: CLINIC | Age: 53
End: 2023-10-26
Payer: COMMERCIAL

## 2023-10-26 DIAGNOSIS — F41.9 ANXIETY: Primary | ICD-10-CM

## 2023-10-26 PROCEDURE — 90834 PSYTX W PT 45 MINUTES: CPT | Performed by: SOCIAL WORKER

## 2023-10-30 NOTE — PSYCH
Visit start and stop times:    10/26/23  Start Time: 1015  Stop Time: 1100  Total Visit Time: 45 minutes  Virtual Regular Visit  Therapist met w/pt for individual session. Pt stated that he continues to struggle both physically and mentally. He stated he has been having nightmares, mood swings, irritability, flashbacks. Therapist and pt continued to talk about the trauma he has experienced but also how he is struggling now. Therapist and pt discussed strategies to help manage his moods. Verification of patient location:    Patient is located at Home in the following state in which I hold an active license [place]      Assessment/Plan:    Problem List Items Addressed This Visit          Other    Anxiety - Primary              Reason for visit is No chief complaint on file. Encounter provider Idalia Mail    Provider located at [address]      Recent Visits  Date Type Provider Dept   10/26/23 Telemedicine [address] [place] [address] recent visits within past 7 days and meeting all other requirements  Future Appointments  No visits were found meeting these conditions. Showing future appointments within next 150 days and meeting all other requirements       The patient was identified by name and date of birth. Vic Fernandez was informed that this is a telemedicine visit and that [place] [address]. He agrees to proceed. .  My office door was closed. No one else was in the room. He acknowledged consent and understanding of privacy and security of the video platform. The patient has agreed to participate and understands they can discontinue the visit at any time. Patient is aware this is a billable service. Subjective  Vic Fernandez is a 48 y.o. male  .       HPI Past Medical History:   Diagnosis Date    Cough     Cough     Cough     High cholesterol     Obesity, Class III, BMI 40-49.9 (morbid obesity) ([facility])     SOB (shortness of breath)     SOB (shortness of breath)     SOB (shortness of breath)     SOB (shortness of breath)     SOB (shortness of breath)     Wheezing     Wheezing     Wheezing        Past Surgical History:   Procedure Laterality Date    APPENDECTOMY  1987    SHOULDER SURGERY  [id number]    several       Current Outpatient Medications   Medication Sig Dispense Refill    albuterol (2.5 mg/3 mL) 0.083 % nebulizer solution Take 3 mL (2.5 mg total) by nebulization every 6 (six) hours as needed for wheezing or shortness of breath 120 mL 0    albuterol (PROVENTIL HFA,VENTOLIN HFA) 90 mcg/act inhaler Inhale 2 puffs every 6 (six) hours as needed for wheezing or shortness of breath 18 g 2    fluticasone (FLONASE) 50 mcg/act nasal spray SPRAY 1 SPRAY INTO EACH NOSTRIL EVERY DAY (Patient not taking: Reported on 12/19/2022) 16 mL 3    fluticasone-umeclidinium-vilanterol (Trelegy Ellipta) 200-62.5-25 mcg/actuation AEPB inhaler Inhale 1 puff daily Rinse mouth after use. 180 blister 0    meloxicam (Mobic) 15 mg tablet Take 1 tablet (15 mg total) by mouth daily (Patient not taking: Reported on 10/4/2023) 30 tablet 0    metFORMIN (GLUCOPHAGE) 500 mg tablet TAKE 1 TABLET BY MOUTH TWICE  DAILY WITH MEALS 180 tablet 0    rosuvastatin (CRESTOR) 5 mg tablet TAKE 1 TABLET BY MOUTH DAILY 90 tablet 0    tirzepatide (Mounjaro) 10 MG/0.5ML Inject 0.5 mL (10 mg total) under the skin every 7 days 6 mL 1     No current facility-administered medications for this visit. No Known Allergies    Review of Systems    Video Exam    There were no vitals filed for this visit.     Physical Exam Pt denied any SI/HI/AH/VH

## 2023-11-28 ENCOUNTER — OFFICE VISIT (OUTPATIENT)
Age: 53
End: 2023-11-28
Payer: COMMERCIAL

## 2023-11-28 VITALS
WEIGHT: 315 LBS | TEMPERATURE: 97.6 F | BODY MASS INDEX: 41.75 KG/M2 | SYSTOLIC BLOOD PRESSURE: 110 MMHG | HEIGHT: 73 IN | OXYGEN SATURATION: 95 % | HEART RATE: 97 BPM | DIASTOLIC BLOOD PRESSURE: 84 MMHG

## 2023-11-28 DIAGNOSIS — U09.9 COVID-19 LONG HAULER: ICD-10-CM

## 2023-11-28 DIAGNOSIS — R06.02 SOB (SHORTNESS OF BREATH): Primary | ICD-10-CM

## 2023-11-28 DIAGNOSIS — J45.40 MODERATE PERSISTENT ASTHMA WITHOUT COMPLICATION: ICD-10-CM

## 2023-11-28 DIAGNOSIS — E66.01 MORBID OBESITY (HCC): ICD-10-CM

## 2023-11-28 PROCEDURE — 99214 OFFICE O/P EST MOD 30 MIN: CPT | Performed by: INTERNAL MEDICINE

## 2023-11-28 RX ORDER — BUDESONIDE 0.5 MG/2ML
0.5 INHALANT ORAL 2 TIMES DAILY
Qty: 360 ML | Refills: 1 | Status: SHIPPED | OUTPATIENT
Start: 2023-11-28 | End: 2024-05-26

## 2023-11-28 RX ORDER — ALBUTEROL SULFATE 90 UG/1
2 AEROSOL, METERED RESPIRATORY (INHALATION) EVERY 6 HOURS PRN
Qty: 18 G | Refills: 2 | Status: SHIPPED | OUTPATIENT
Start: 2023-11-28

## 2023-11-28 RX ORDER — FLUTICASONE FUROATE, UMECLIDINIUM BROMIDE AND VILANTEROL TRIFENATATE 200; 62.5; 25 UG/1; UG/1; UG/1
1 POWDER RESPIRATORY (INHALATION) DAILY
Qty: 180 BLISTER | Refills: 1 | Status: SHIPPED | OUTPATIENT
Start: 2023-11-28 | End: 2024-05-26

## 2023-11-28 NOTE — PROGRESS NOTES
Assessment/Plan:   Diagnoses and all orders for this visit:    SOB (shortness of breath)    Moderate persistent asthma without complication  -     fluticasone-umeclidinium-vilanterol (Trelegy Ellipta) 200-62.5-25 mcg/actuation AEPB inhaler; Inhale 1 puff daily Rinse mouth after use. -     albuterol (PROVENTIL HFA,VENTOLIN HFA) 90 mcg/act inhaler; Inhale 2 puffs every 6 (six) hours as needed for wheezing or shortness of breath  -     budesonide (Pulmicort) 0.5 mg/2 mL nebulizer solution; Take 2 mL (0.5 mg total) by nebulization 2 (two) times a day Rinse mouth after use. COVID-19 long hauler    Morbid obesity (HCC)        shortness of breath and dyspnea on exertion with reactive airway disease moderate persistent asthma without complication not in any exacerbation currently   PFTs with FEV1 of 51% with response to the bronchodilator in the small airways 6 minute walk test did not need any supplemental oxygen,   A high-resolution CAT scan was ordered for him the past, he had some insurance issues did not get a at [facility] he did have his PFT done at [facility] also the CAT scan done there did not have the report yet, discussed with patient to see if he can get the report from the hospital.  Discussed with the patient to continue with Trelegy 1 puff daily, rinse mouth after use  contiue with albuterol via nebulizer four times daily as needed  And budesonide via the nebulizer twice daily  Rinse mouth after use  Discussed with the patient to get the records of all the testings that he has had since his last visit at [facility] as well as in [facility] for the records. I am not ordering any repeat PFTs or 6-minute walk test here or chest imaging because he states he has had all this test recently. Recommend weight loss  Follow-up in 3 months or earlier as needed  Return in about 3 months (around 2/28/2024). All questions are answered to the patient's satisfaction and understanding. He verbalizes understanding.   He is encouraged to call with any further questions or concerns. Portions of the record may have been created with voice recognition software. Occasional wrong word or "sound a like" substitutions may have occurred due to the inherent limitations of voice recognition software. Read the chart carefully and recognize, using context, where substitutions have occurred. Electronically Signed by Amrita Josue MD    ______________________________________________________________________    Chief Complaint:   Chief Complaint   Patient presents with    Follow-up       Patient ID: Stefanie Piedra is a 48 y.o. y.o. male has a past medical history of Cough, Cough, Cough, High cholesterol, Obesity, Class III, BMI 40-49.9 (morbid obesity) ([address]), SOB (shortness of breath), SOB (shortness of breath), SOB (shortness of breath), SOB (shortness of breath), SOB (shortness of breath), Wheezing, Wheezing, and Wheezing. 11/28/2023  Patient presents today for follow-up visit. 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, and a  states he has been doing 2 jobs for several years, until he contracted COVID-23 infection in December of 2020 states he was not hospitalized he could manage it at home with supportive treatment as well as the steroids he states, he was also given an inhaler given he was having shortness of breath and wheezing he states he has been having asthma in the past with allergies especially hay fever and and when his wheezing was bad after the COVID-19 infection he was given albuterol inhaler which he has been using once or twice a day he states  He is continuing to use the Trelegy 1 puff once a day and using the albuterol via nebulizer 3 times daily and he states he has been feeling better than before walking slightly longer distances than  before.   Although he is not back to his baseline. He states in the interim since his last visit here, he has had several testing with PFTs 6-minute walk test as well as cardiac stress test and chest x-rays and also has seen a pulmonologist in [place] with his attorneys, and another pulmonologist in [facility] , he says all the testing has come back negative and have all agreed on the reactive airway disease post COVID infection and COVID long-hauler, and have asked him to continue with the current medications he is currently disabled and not working        Review of Systems   Constitutional: Negative. HENT: Negative. Eyes: Negative. Respiratory:  Positive for shortness of breath. Cardiovascular: Negative. Gastrointestinal: Negative. Endocrine: Negative. Genitourinary: Negative. Musculoskeletal: Negative. Allergic/Immunologic: Negative. Neurological: Negative. Hematological: Negative. Psychiatric/Behavioral: Negative. Smoking history: He reports that he quit smoking about 3 years ago. His smoking use included cigarettes. He started smoking about 33 years ago. He has a 7.50 pack-year smoking history.  He has never used smokeless tobacco.    The following portions of the patient's history were reviewed and updated as appropriate: allergies, current medications, past family history, past medical history, past social history, past surgical history, and problem list.    Immunization History   Administered Date(s) Administered    Rabies Immune Globulin 09/11/2022    Rabies-IM Human Diploid Cell Culture 09/11/2022, 09/14/2022, 09/19/2022, 09/27/2022    Tdap 09/11/2022     Current Outpatient Medications   Medication Sig Dispense Refill    albuterol (2.5 mg/3 mL) 0.083 % nebulizer solution Take 3 mL (2.5 mg total) by nebulization every 6 (six) hours as needed for wheezing or shortness of breath 120 mL 0    albuterol (PROVENTIL HFA,VENTOLIN HFA) 90 mcg/act inhaler Inhale 2 puffs every 6 (six) hours as needed for wheezing or shortness of breath 18 g 2    budesonide (Pulmicort) 0.5 mg/2 mL nebulizer solution Take 2 mL (0.5 mg total) by nebulization 2 (two) times a day Rinse mouth after use. 360 mL 1    fluticasone-umeclidinium-vilanterol (Trelegy Ellipta) 200-62.5-25 mcg/actuation AEPB inhaler Inhale 1 puff daily Rinse mouth after use. 180 blister 1    metFORMIN (GLUCOPHAGE) 500 mg tablet TAKE 1 TABLET BY MOUTH TWICE  DAILY WITH MEALS 180 tablet 0    rosuvastatin (CRESTOR) 5 mg tablet TAKE 1 TABLET BY MOUTH DAILY 90 tablet 0    tirzepatide (Mounjaro) 10 MG/0.5ML Inject 0.5 mL (10 mg total) under the skin every 7 days 6 mL 1    fluticasone (FLONASE) 50 mcg/act nasal spray SPRAY 1 SPRAY INTO EACH NOSTRIL EVERY DAY (Patient not taking: Reported on 12/19/2022) 16 mL 3    meloxicam (Mobic) 15 mg tablet Take 1 tablet (15 mg total) by mouth daily (Patient not taking: Reported on 10/4/2023) 30 tablet 0     No current facility-administered medications for this visit. Allergies: Patient has no known allergies. Objective:  Vitals:    11/28/23 0850   BP: 110/84   Pulse: 97   Temp: 97.6 °F (36.4 °C)   SpO2: 95%   Weight: (!) 157 kg (346 lb)   Height: 6' 1.23" (1.86 m)   Oxygen Therapy  SpO2: 95 %  . Wt Readings from Last 3 Encounters:   11/28/23 (!) 157 kg (346 lb)   10/04/23 (!) 158 kg (348 lb 3.2 oz)   09/01/23 (!) 160 kg (352 lb)     Body mass index is 45.37 kg/m². Physical Exam  Constitutional:       Appearance: He is well-developed. HENT:      Head: Normocephalic and atraumatic. Eyes:      Pupils: Pupils are equal, round, and reactive to light. Neck:      Comments: Short and wide neck  Cardiovascular:      Rate and Rhythm: Normal rate and regular rhythm. Heart sounds: Normal heart sounds. Pulmonary:      Effort: Pulmonary effort is normal.      Breath sounds: Normal breath sounds. Musculoskeletal:         General: Normal range of motion. Cervical back: Normal range of motion and neck supple.    Skin: General: Skin is warm and dry. Neurological:      Mental Status: He is alert and oriented to person, place, and time.    Psychiatric:         Behavior: Behavior normal.         Lab Review:   Orders Only on 08/01/2023   Component Date Value    Total Cholesterol 08/01/2023 194     HDL 08/01/2023 39 (L)     Triglycerides 08/01/2023 181 (H)     LDL Calculated 08/01/2023 125 (H)     Chol HDLC Ratio 08/01/2023 5.0 (H)     Non-HDL Cholesterol 08/01/2023 155 (H)     Glucose, Random 08/01/2023 94     BUN 08/01/2023 16     Creatinine 08/01/2023 1.12     eGFR 08/01/2023 79     SL AMB BUN/CREATININE RA* 08/01/2023 SEE NOTE:     Sodium 08/01/2023 140     Potassium 08/01/2023 4.5     Chloride 08/01/2023 105     CO2 08/01/2023 26     Calcium 08/01/2023 9.8     Protein, Total 08/01/2023 6.9     Albumin 08/01/2023 4.0     Globulin 08/01/2023 2.9     Albumin/Globulin Ratio 08/01/2023 1.4     TOTAL BILIRUBIN 08/01/2023 0.4     Alkaline Phosphatase 08/01/2023 87     AST 08/01/2023 16     ALT 08/01/2023 24     Color UA 08/01/2023 YELLOW     Urine Appearance 08/01/2023 CLEAR     Specific Gravity 08/01/2023 1.017     Ph 08/01/2023 5.5     Glucose, Urine 08/01/2023 NEGATIVE     Bilirubin, Urine 08/01/2023 NEGATIVE     Ketone, Urine 08/01/2023 NEGATIVE     Blood, Urine 08/01/2023 NEGATIVE     Protein, Urine 08/01/2023 NEGATIVE     Nitrites Urine 08/01/2023 NEGATIVE     Leukocyte Esterase 08/01/2023 TRACE (A)     SL AMB WBC, URINE 08/01/2023 0-5     RBC, Urine 08/01/2023 NONE SEEN     Squamous Epithelial Cells 08/01/2023 0-5     Bacteria, UA 08/01/2023 NONE SEEN     Hyaline Casts 08/01/2023 NONE SEEN     Comment(s) 08/01/2023      White Blood Cell Count 08/01/2023 10.6     Red Blood Cell Count 08/01/2023 5.78     Hemoglobin 08/01/2023 17.9 (H)     HCT 08/01/2023 53.3 (H)     MCV 08/01/2023 92.2     MCH 08/01/2023 31.0     MCHC 08/01/2023 33.6     RDW 08/01/2023 13.8     Platelet Count 97/22/0103 217     SL AMB MPV 08/01/2023 11.1 Neutrophils (Absolute) 08/01/2023 6,954     Lymphocytes (Absolute) 08/01/2023 2,449     Monocytes (Absolute) 08/01/2023 869     Eosinophils (Absolute) 08/01/2023 233     Basophils ABS 08/01/2023 95     Neutrophils 08/01/2023 65.6     Lymphocytes 08/01/2023 23.1     Monocytes 08/01/2023 8.2     Eosinophils 08/01/2023 2.2     Basophils PCT 08/01/2023 0.9     TSH 08/01/2023 2.06     Vitamin D, 25-Hydroxy, S* 08/01/2023 27 (L)     Urine Culture, Comprehen* 08/01/2023      Urine Culture Result 08/01/2023         Diagnostics:  I have personally reviewed pertinent reports.

## 2023-12-18 RX ORDER — FLUTICASONE FUROATE, UMECLIDINIUM BROMIDE AND VILANTEROL TRIFENATATE 200; 62.5; 25 UG/1; UG/1; UG/1
1 POWDER RESPIRATORY (INHALATION) DAILY
Qty: 42 BLISTER | Refills: 0 | Status: SHIPPED | COMMUNITY
Start: 2023-12-18 | End: 2024-01-29

## 2023-12-18 RX ORDER — FLUTICASONE FUROATE, UMECLIDINIUM BROMIDE AND VILANTEROL TRIFENATATE 200; 62.5; 25 UG/1; UG/1; UG/1
1 POWDER RESPIRATORY (INHALATION) DAILY
Qty: 28 BLISTER | Refills: 0 | Status: SHIPPED | COMMUNITY
Start: 2023-12-18 | End: 2024-01-15

## 2023-12-27 ENCOUNTER — TELEPHONE (OUTPATIENT)
Age: 53
End: 2023-12-27

## 2024-01-02 DIAGNOSIS — R73.03 PREDIABETES: ICD-10-CM

## 2024-01-02 DIAGNOSIS — E66.01 OBESITY, CLASS III, BMI 40-49.9 (MORBID OBESITY) (HCC): ICD-10-CM

## 2024-01-02 NOTE — TELEPHONE ENCOUNTER
Spoke with patient.  He is frustrated with [facility] and wants his prescriptions sent over to the [facility] pharmacy in [place].he's going out of pocket with his meds since [facility] gives him nothing but issues every time.  Please call in his 10 mg of monjourno..

## 2024-01-02 NOTE — TELEPHONE ENCOUNTER
Spoke with patient and states [facility] do not accept his coupon anymore, He will like you order Mounjauro 10 mg, for 30 days. and send it to [facility]. He will pay copay from his pocket.  He has an appt on 1/22/2024.    Thanks,

## 2024-01-10 DIAGNOSIS — E78.5 HYPERLIPIDEMIA, UNSPECIFIED HYPERLIPIDEMIA TYPE: ICD-10-CM

## 2024-01-10 RX ORDER — ROSUVASTATIN CALCIUM 5 MG/1
5 TABLET, COATED ORAL DAILY
Qty: 90 TABLET | Refills: 0 | Status: SHIPPED | OUTPATIENT
Start: 2024-01-10

## 2024-01-12 DIAGNOSIS — J45.40 MODERATE PERSISTENT ASTHMA WITHOUT COMPLICATION: Primary | ICD-10-CM

## 2024-01-12 RX ORDER — ALBUTEROL SULFATE 90 UG/1
2 AEROSOL, METERED RESPIRATORY (INHALATION) EVERY 6 HOURS PRN
Qty: 18 G | Refills: 3 | Status: SHIPPED | OUTPATIENT
Start: 2024-01-12

## 2024-01-17 ENCOUNTER — TELEPHONE (OUTPATIENT)
Dept: BARIATRICS | Facility: CLINIC | Age: 54
End: 2024-01-17

## 2024-01-17 DIAGNOSIS — R73.03 PREDIABETES: ICD-10-CM

## 2024-01-17 DIAGNOSIS — E66.01 OBESITY, CLASS III, BMI 40-49.9 (MORBID OBESITY) (HCC): ICD-10-CM

## 2024-01-17 NOTE — TELEPHONE ENCOUNTER
Good Day Dr. Concepcion,    Patient is requesting his Mounjaro be sent to [facility]. He has cancelled the prescription at [facility]. Please send medication to [facility].    Thank you

## 2024-01-22 ENCOUNTER — OFFICE VISIT (OUTPATIENT)
Dept: BARIATRICS | Facility: CLINIC | Age: 54
End: 2024-01-22
Payer: COMMERCIAL

## 2024-01-22 VITALS
HEART RATE: 74 BPM | DIASTOLIC BLOOD PRESSURE: 80 MMHG | HEIGHT: 73 IN | WEIGHT: 315 LBS | SYSTOLIC BLOOD PRESSURE: 130 MMHG | BODY MASS INDEX: 41.75 KG/M2 | RESPIRATION RATE: 14 BRPM

## 2024-01-22 DIAGNOSIS — E66.01 OBESITY, CLASS III, BMI 40-49.9 (MORBID OBESITY) (HCC): Primary | ICD-10-CM

## 2024-01-22 DIAGNOSIS — R73.03 PREDIABETES: ICD-10-CM

## 2024-01-22 PROCEDURE — 99214 OFFICE O/P EST MOD 30 MIN: CPT | Performed by: FAMILY MEDICINE

## 2024-01-22 RX ORDER — TIRZEPATIDE 10 MG/.5ML
10 INJECTION, SOLUTION SUBCUTANEOUS WEEKLY
Qty: 2 ML | Refills: 0 | Status: SHIPPED | OUTPATIENT
Start: 2024-01-22 | End: 2024-01-29

## 2024-01-22 NOTE — PROGRESS NOTES
Assessment/Plan:  Manuel was seen today for follow-up.  1. Obesity, Class III, BMI 40-49.9 (morbid obesity) (HCC)  tirzepatide (Zepbound) 10 mg/0.5 mL auto-injector      2. Prediabetes              Diagnoses and all orders for this visit:  1. Obesity, Class III, BMI 40-49.9 (morbid obesity) (HCC)  contnue dietary changes  Lost 8.8%  rediscussed bariatric surgery - advised patient with surgery loses about 50% and with medical weight loss 10%- he declines for now  Diet plan continue: mediterranean diet 1800kcal  Behavioral changes - great   Increase physical activity  Try Zepbound to continue weight loss progress  Discussed Phentermine if he cannot find any Zepbound or Mounjaro  2. Prediabetes  Increase Mounjaro dose to 10mg   No  side effects  Discussed pancreatitis side effects , avoid fatty meals  Mixed hyperlipidemia   CV risk factors will decrease with weight loss  At risk for sleep apnea  Encouraged to schedule a sleep study  Encourage mindful eating, portion control, motivational interview performed to help patient reach goals   Patient denies personal and family history of  pancreatitis, thyroid cancer, MEN-2 tumors.  Follow up in approximately 3 mo with me      Subjective:   Chief Complaint   Patient presents with    Follow-up     Patient presents for f/u and states is unable to take Mounjauro since the last 4 months. Mounjauro out of stock.     Patient here to discuss weight associated problems and nutrition goals  HPI: Manuel Resendiz  is a 53 y.o. male with excess weight/obesity here to pursue weight management.  Patient is pursuing Conservative Program.     Wt Readings from Last 10 Encounters:   01/22/24 (!) 154 kg (340 lb)   11/28/23 (!) 157 kg (346 lb)   10/04/23 (!) 158 kg (348 lb 3.2 oz)   09/01/23 (!) 160 kg (352 lb)   08/31/23 (!) 160 kg (352 lb 12.8 oz)   07/12/23 (!) 163 kg (359 lb 6.4 oz)   06/07/23 (!) 164 kg (362 lb 3.2 oz)   05/17/23 (!) 168 kg (370 lb)   05/10/23 (!) 172 kg (379 lb)   12/19/22  "(!) 169 kg (373 lb)        Rybelsus started 4/2021 but he was unable to f/u after 5/2021  Went down to 347 lbs on rybelsus but could not afford after being put on FMLA dt COVID  Was eating cheap food that was affordable- gained weight  OV :12/22 weight 373lbs started Mounjaro  Last OV 348lbs    Current : 340lbs lost 8.8% Mounjaro at 10mg, off for 6 weeks due to  med out of stock  Food logging: no but understands portion control and choices changed  Increased appetite/cravings much less, does not snack on sweets, without Mounjaro feels a difference in his appetite increasing  Protein shake in am daily  Walks dogs in am  Sometimes at lunch has protein shake  Dinner at home chicken veggies   Soda is decreased in amount  Avoided white bread or pasta in months   Snacks: fruits, banana, apples   Exercise:mobility is improving . Does more walking  Hydration:1.5 gallon per day water  Alcohol: no          The following portions of the patient's history were reviewed and updated as appropriate: allergies, current medications, past family history, past medical history, past social history, past surgical history, and problem list.      Review of Systems   Constitutional: Negative for activity change. Fatigue  HENT: Negative for trouble swallowing.    Respiratory: + for shortness of breath.    Cardiovascular: Negative for chest pain, edema  Gastrointestinal: Negative for abdominal pain, nausea and vomiting, acid reflux, constipation/diarrhea  Psychiatric/Behavioral: Negative for behavioral problems , anxiety or depression    Objective:  /80 (BP Location: Right arm, Patient Position: Sitting, Cuff Size: Large)   Pulse 74   Resp 14   Ht 6' 1.23\" (1.86 m)   Wt (!) 154 kg (340 lb)   BMI 44.58 kg/m²   Constitutional: Well-developed, well-nourished and Obese Body mass index is 44.58 kg/m²..  HEENT: No conjunctival injection.  Pulmonary: No increased work of breathing or signs of respiratory distress.  CV: Well-perfused, " Regular rate and rhythm, no edema  GI: increased abdominal girth. Non-distended.  Endo: no ophthalmopathy, no dermopathy, truncal obesity  MSK: no sarcopenia  Neuro: Oriented to person, place and time. Normal Speech. Normal gait.  Psych: Normal affect and mood. No delusion or hallucinations, normal thought process

## 2024-01-26 ENCOUNTER — TELEPHONE (OUTPATIENT)
Dept: BARIATRICS | Facility: CLINIC | Age: 54
End: 2024-01-26

## 2024-01-26 NOTE — TELEPHONE ENCOUNTER
Good Day Dr. Concepcion,    Patient was able to  Mounjaro so he is not going to need Zepbound. He says he will use Mounjaro.    Thank you.

## 2024-02-05 ENCOUNTER — TELEMEDICINE (OUTPATIENT)
Dept: BEHAVIORAL/MENTAL HEALTH CLINIC | Facility: CLINIC | Age: 54
End: 2024-02-05
Payer: COMMERCIAL

## 2024-02-05 DIAGNOSIS — F41.9 ANXIETY: Primary | ICD-10-CM

## 2024-02-05 PROCEDURE — 90837 PSYTX W PT 60 MINUTES: CPT | Performed by: SOCIAL WORKER

## 2024-02-06 NOTE — PSYCH
Virtual Regular Visit  Therapist met w/pt for individual session.  Pt apologized for not meeting since October but was having some billing issues that he needed to get resolved due to being out of work.  Pt shared that the biggest stressor right now is financial due to being out of work with COVID and how it has affected him.  He stated that he is still experiencing a lot of symptoms including: racing thoughts, anger, depression, flashbacks, nightmares.  Therapist and pt discussed different strategies to help manage his symptoms. Therapist and pt discussed ways to channel some of his anger/frustrations.   Verification of patient location:    Patient is located at Home in the following state in which I hold an active license [place]      Assessment/Plan: f/u in 2-3 weeks      Visit start and stop times:    02/06/24  Start Time: 1300  Stop Time: 1355  Total Visit Time: 55 minutes    Problem List Items Addressed This Visit          Other    Anxiety - Primary          Reason for visit is No chief complaint on file.       Encounter provider Melina Capellan    Provider located at [place] [address] [place] [address] [address] [facility]  [address] [facility] [place] [address]  [phone]      Recent Visits  Date Type Provider Dept   02/05/24 Telemedicine Melina Capellan  Psychiatric [facility] [address] [facility]   Showing recent visits within past 7 days and meeting all other requirements  Future Appointments  No visits were found meeting these conditions.  Showing future appointments within next 150 days and meeting all other requirements       The patient was identified by name and date of birth. Manuel Resendiz was informed that this is a telemedicine visit and that the visit is being conducted throughthe Epic Embedded platform. He agrees to proceed..  My office door was closed. No one else was in the room.  He acknowledged consent and understanding of privacy and  security of the video platform. The patient has agreed to participate and understands they can discontinue the visit at any time.    Patient is aware this is a billable service.     Subjective  Manuel Resendiz is a 53 y.o. male  .      HPI     Past Medical History:   Diagnosis Date    Cough     Cough     Cough     High cholesterol     Obesity, Class III, BMI 40-49.9 (morbid obesity) ([facility])     SOB (shortness of breath)     SOB (shortness of breath)     SOB (shortness of breath)     SOB (shortness of breath)     SOB (shortness of breath)     Wheezing     Wheezing     Wheezing        Past Surgical History:   Procedure Laterality Date    APPENDECTOMY  1987    SHOULDER SURGERY  [id number]    several       Current Outpatient Medications   Medication Sig Dispense Refill    albuterol (2.5 mg/3 mL) 0.083 % nebulizer solution Take 3 mL (2.5 mg total) by nebulization every 6 (six) hours as needed for wheezing or shortness of breath 120 mL 0    albuterol (PROVENTIL HFA,VENTOLIN HFA) 90 mcg/act inhaler Inhale 2 puffs every 6 (six) hours as needed for wheezing or shortness of breath 18 g 2    albuterol (Ventolin HFA) 90 mcg/act inhaler Inhale 2 puffs every 6 (six) hours as needed for wheezing 18 g 3    budesonide (Pulmicort) 0.5 mg/2 mL nebulizer solution Take 2 mL (0.5 mg total) by nebulization 2 (two) times a day Rinse mouth after use. 360 mL 1    fluticasone (FLONASE) 50 mcg/act nasal spray SPRAY 1 SPRAY INTO EACH NOSTRIL EVERY DAY (Patient not taking: Reported on 1/22/2024) 16 mL 3    fluticasone-umeclidinium-vilanterol (Trelegy Ellipta) 200-62.5-25 mcg/actuation AEPB inhaler Inhale 1 puff daily for 28 days Rinse mouth after use. 28 blister 0    fluticasone-umeclidinium-vilanterol (Trelegy Ellipta) 200-62.5-25 mcg/actuation AEPB inhaler Inhale 1 puff daily Rinse mouth after use. 42 blister 0    fluticasone-umeclidinium-vilanterol (Trelegy Ellipta) 200-62.5-25 mcg/actuation AEPB inhaler Inhale 1 puff daily Rinse mouth after  use. 180 blister 1    meloxicam (Mobic) 15 mg tablet Take 1 tablet (15 mg total) by mouth daily 30 tablet 0    metFORMIN (GLUCOPHAGE) 500 mg tablet TAKE 1 TABLET BY MOUTH TWICE  DAILY WITH MEALS 180 tablet 0    rosuvastatin (CRESTOR) 5 mg tablet TAKE 1 TABLET BY MOUTH DAILY 90 tablet 0    tirzepatide (Mounjaro) 10 MG/0.5ML Inject 0.5 mL (10 mg total) under the skin every 7 days (Patient not taking: Reported on 1/22/2024) 6 mL 1     No current facility-administered medications for this visit.        No Known Allergies    Review of Systems    Video Exam    There were no vitals filed for this visit.    Physical Exam Pt denied any SI/HI/AH/VH

## 2024-02-27 DIAGNOSIS — M54.50 LOW BACK PAIN WITHOUT SCIATICA, UNSPECIFIED BACK PAIN LATERALITY, UNSPECIFIED CHRONICITY: ICD-10-CM

## 2024-02-27 RX ORDER — MELOXICAM 15 MG/1
15 TABLET ORAL DAILY
Qty: 30 TABLET | Refills: 11 | Status: SHIPPED | OUTPATIENT
Start: 2024-02-27

## 2024-03-08 DIAGNOSIS — J45.40 MODERATE PERSISTENT ASTHMA WITHOUT COMPLICATION: ICD-10-CM

## 2024-03-08 RX ORDER — ALBUTEROL SULFATE 90 UG/1
AEROSOL, METERED RESPIRATORY (INHALATION)
Qty: 13.4 G | Refills: 5 | Status: SHIPPED | OUTPATIENT
Start: 2024-03-08

## 2024-03-12 ENCOUNTER — TELEPHONE (OUTPATIENT)
Age: 54
End: 2024-03-12

## 2024-03-12 DIAGNOSIS — R73.03 PREDIABETES: Primary | ICD-10-CM

## 2024-03-12 DIAGNOSIS — E66.01 OBESITY, CLASS III, BMI 40-49.9 (MORBID OBESITY) (HCC): ICD-10-CM

## 2024-03-12 RX ORDER — TIRZEPATIDE 10 MG/.5ML
10 INJECTION, SOLUTION SUBCUTANEOUS WEEKLY
Qty: 6 ML | Refills: 1 | Status: SHIPPED | OUTPATIENT
Start: 2024-03-12

## 2024-03-12 NOTE — TELEPHONE ENCOUNTER
Patient called because he can't get the Mounjaro, his pharmacy said it is out of stock.  Last visit Dr Concepcion told him if its a problem to get to let her know and he can try Phentermine instead, so he would like that to be called in.      I told him I would send the request over and he confirmed it should be sent to [facility] delivery.  I asked him to please allow 48 hours for the request to be submitted and we can let him know when it was sent it

## 2024-03-25 ENCOUNTER — TELEPHONE (OUTPATIENT)
Age: 54
End: 2024-03-25

## 2024-03-25 NOTE — TELEPHONE ENCOUNTER
Patient called back stating [facility] told him that they cannot get him the zepbound but he can go on the Phentermine for a 30 day supply if we put on the script that he is new to the medication and trying it for compatibility. He also wanted to note he is still on the waiting list for [facility]

## 2024-03-25 NOTE — TELEPHONE ENCOUNTER
Pt called because he thought the Dr was prescribing Phentermine and got notice he has a script for Zepbound.  I explained that Dr Concepcion sent him a message in his MyChart that she can only prescribe the Phentermine for 30 days and his pharmacy plan may not allow for only 30 days, so she sent for Zepbound instead.  He explained he is locked out of his MyChart, I gave him the phone number for [employer].  He is going to check with [facility] and see if they have the Zepbound and the price and ask about the Phentermine for 30 days.  If there is any issues he will call back

## 2024-03-26 ENCOUNTER — TELEPHONE (OUTPATIENT)
Dept: BARIATRICS | Facility: CLINIC | Age: 54
End: 2024-03-26

## 2024-03-26 NOTE — TELEPHONE ENCOUNTER
Good Day,    Just wanted you to be aware that Dr. Concepcion is away from the office and will return on April 8,2024.  Phentermine is a Schedule IV controlled substance, and requires watchful management. The covering Doctors are not able to prescribe since they are not familiar with you.  I apologize for you having to wait for Dr. Concepcion's return. Please continue with the program you and she discussed on your visit.  Upon her return she will see this correspondence.    [facility].

## 2024-03-26 NOTE — TELEPHONE ENCOUNTER
Please let the patient know that Dr. Concepcion will return in 2 weeks and can make that decision at that time.     Juan Forde

## 2024-03-28 ENCOUNTER — OFFICE VISIT (OUTPATIENT)
Age: 54
End: 2024-03-28
Payer: COMMERCIAL

## 2024-03-28 VITALS
SYSTOLIC BLOOD PRESSURE: 124 MMHG | BODY MASS INDEX: 44.58 KG/M2 | HEIGHT: 73 IN | HEART RATE: 103 BPM | DIASTOLIC BLOOD PRESSURE: 76 MMHG | TEMPERATURE: 98.2 F | OXYGEN SATURATION: 92 %

## 2024-03-28 DIAGNOSIS — U09.9 COVID-19 LONG HAULER: ICD-10-CM

## 2024-03-28 DIAGNOSIS — J45.40 MODERATE PERSISTENT ASTHMA WITHOUT COMPLICATION: ICD-10-CM

## 2024-03-28 DIAGNOSIS — R06.02 SOB (SHORTNESS OF BREATH): Primary | ICD-10-CM

## 2024-03-28 DIAGNOSIS — E78.5 HYPERLIPIDEMIA, UNSPECIFIED HYPERLIPIDEMIA TYPE: ICD-10-CM

## 2024-03-28 DIAGNOSIS — E66.01 MORBID OBESITY (HCC): ICD-10-CM

## 2024-03-28 PROCEDURE — 99214 OFFICE O/P EST MOD 30 MIN: CPT | Performed by: INTERNAL MEDICINE

## 2024-03-28 RX ORDER — ROSUVASTATIN CALCIUM 5 MG/1
5 TABLET, COATED ORAL DAILY
Qty: 90 TABLET | Refills: 3 | Status: SHIPPED | OUTPATIENT
Start: 2024-03-28

## 2024-03-28 RX ORDER — ALBUTEROL SULFATE 2.5 MG/3ML
2.5 SOLUTION RESPIRATORY (INHALATION) EVERY 6 HOURS PRN
Qty: 120 ML | Refills: 0 | Status: SHIPPED | OUTPATIENT
Start: 2024-03-28

## 2024-03-28 RX ORDER — BUDESONIDE 0.5 MG/2ML
0.5 INHALANT ORAL 2 TIMES DAILY
Qty: 360 ML | Refills: 1 | Status: SHIPPED | OUTPATIENT
Start: 2024-03-28 | End: 2024-09-24

## 2024-03-28 RX ORDER — FLUTICASONE FUROATE, UMECLIDINIUM BROMIDE AND VILANTEROL TRIFENATATE 200; 62.5; 25 UG/1; UG/1; UG/1
1 POWDER RESPIRATORY (INHALATION) DAILY
Qty: 180 BLISTER | Refills: 1 | Status: SHIPPED | OUTPATIENT
Start: 2024-03-28 | End: 2024-09-24

## 2024-03-28 NOTE — PROGRESS NOTES
Assessment/Plan:   Diagnoses and all orders for this visit:    SOB (shortness of breath)    Moderate persistent asthma without complication  -     fluticasone-umeclidinium-vilanterol (Trelegy Ellipta) 200-62.5-25 mcg/actuation AEPB inhaler; Inhale 1 puff daily Rinse mouth after use.  -     budesonide (Pulmicort) 0.5 mg/2 mL nebulizer solution; Take 2 mL (0.5 mg total) by nebulization 2 (two) times a day Rinse mouth after use.  -     albuterol (2.5 mg/3 mL) 0.083 % nebulizer solution; Take 3 mL (2.5 mg total) by nebulization every 6 (six) hours as needed for wheezing or shortness of breath    COVID-19 long hauler    Morbid obesity (HCC)        shortness of breath and dyspnea on exertion with reactive airway disease moderate persistent asthma without complication not in any exacerbation currently   PFTs with FEV1 of 51% with response to the bronchodilator in the small airways 6 minute walk test did not need any supplemental oxygen,   A high-resolution CAT scan was ordered for him the past, he had some insurance issues did not get a at [facility] he did have his PFT done at [facility] also the CAT scan done there did not have the report yet, discussed with patient to see if he can get the report from the hospital.  Discussed with the patient to continue with Trelegy 1 puff daily, rinse mouth after use  contiue with albuterol via nebulizer four times daily as needed  Continue with budesonide via the nebulizer twice daily  Rinse mouth after use  Continue with albuterol via the nebulizer 4 times daily as needed  Continue with following up with weight management, he states he has lost around 70 pounds since his last last visit  Recommend weight loss  Follow-up in 6 months or earlier as needed  No follow-ups on file.  All questions are answered to the patient's satisfaction and understanding.  He verbalizes understanding.  He is encouraged to call with any further questions or concerns.    Portions of the record may have  "been created with voice recognition software.  Occasional wrong word or \"sound a like\" substitutions may have occurred due to the inherent limitations of voice recognition software.  Read the chart carefully and recognize, using context, where substitutions have occurred.    Electronically Signed by Ashley Desai MD    ______________________________________________________________________    Chief Complaint:   Chief Complaint   Patient presents with    Follow-up       Patient ID: Manuel is a 53 y.o. y.o. male has a past medical history of Cough, Cough, Cough, High cholesterol, Obesity, Class III, BMI 40-49.9 (morbid obesity) ([facility]), SOB (shortness of breath), SOB (shortness of breath), SOB (shortness of breath), SOB (shortness of breath), SOB (shortness of breath), Wheezing, Wheezing, and Wheezing.    3/28/2024  Patient presents today for follow-up visit.  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, and a  states he has been doing 2 jobs for several years, until he contracted COVID-19 infection in December of 2020 states he was not hospitalized he could manage it at home with supportive treatment as well as the steroids he states, he was also given an inhaler given he was having shortness of breath and wheezing he states he has been having asthma in the past with allergies especially hay fever and and when his wheezing was bad after the COVID-19 infection he was given albuterol inhaler which he has been using once or twice a day he states  He is continuing to use the Trelegy 1 puff once a day and using the budesonide via the nebulizer twice daily and not having the need to use the albuterol via the nebulizer, occasionally uses the albuterol MDI  Although he is not back to his baseline.  He states in the interim since his last visit here, he has had several " testing with PFTs 6-minute walk test as well as cardiac stress test and chest x-rays and also has seen a pulmonologist in [place] with his attorneys, and another pulmonologist in [place] , he says all the testing has come back negative and have all agreed on the reactive airway disease post COVID infection and COVID long-hauler, and have asked him to continue with the current medications he is currently disabled and not working        Review of Systems   Constitutional: Negative.    HENT: Negative.     Eyes: Negative.    Respiratory:  Positive for shortness of breath.    Cardiovascular: Negative.    Gastrointestinal: Negative.    Endocrine: Negative.    Genitourinary: Negative.    Musculoskeletal: Negative.    Allergic/Immunologic: Negative.    Neurological: Negative.    Hematological: Negative.    Psychiatric/Behavioral: Negative.         Smoking history: He reports that he quit smoking about 4 years ago. His smoking use included cigarettes. He started smoking about 34 years ago. He has a 7.5 pack-year smoking history. He has never used smokeless tobacco.    The following portions of the patient's history were reviewed and updated as appropriate: allergies, current medications, past family history, past medical history, past social history, past surgical history, and problem list.    Immunization History   Administered Date(s) Administered    Rabies Immune Globulin 09/11/2022    Rabies-IM Human Diploid Cell Culture 09/11/2022, 09/14/2022, 09/19/2022, 09/27/2022    Tdap 09/11/2022     Current Outpatient Medications   Medication Sig Dispense Refill    albuterol (2.5 mg/3 mL) 0.083 % nebulizer solution Take 3 mL (2.5 mg total) by nebulization every 6 (six) hours as needed for wheezing or shortness of breath 120 mL 0    albuterol (PROVENTIL HFA,VENTOLIN HFA) 90 mcg/act inhaler INHALE 2 INHALATIONS BY MOUTH  EVERY 6 HOURS AS NEEDED FOR  WHEEZING OR SHORTNESS OF BREATH 13.4 g 5    albuterol (Ventolin HFA) 90 mcg/act  "inhaler Inhale 2 puffs every 6 (six) hours as needed for wheezing 18 g 3    budesonide (Pulmicort) 0.5 mg/2 mL nebulizer solution Take 2 mL (0.5 mg total) by nebulization 2 (two) times a day Rinse mouth after use. 360 mL 1    fluticasone-umeclidinium-vilanterol (Trelegy Ellipta) 200-62.5-25 mcg/actuation AEPB inhaler Inhale 1 puff daily Rinse mouth after use. 180 blister 1    meloxicam (MOBIC) 15 mg tablet TAKE 1 TABLET BY MOUTH DAILY 30 tablet 11    metFORMIN (GLUCOPHAGE) 500 mg tablet TAKE 1 TABLET BY MOUTH TWICE  DAILY WITH MEALS 180 tablet 0    rosuvastatin (CRESTOR) 5 mg tablet TAKE 1 TABLET BY MOUTH DAILY 90 tablet 3    tirzepatide (Zepbound) 10 mg/0.5 mL auto-injector Inject 0.5 mL (10 mg total) under the skin once a week 6 mL 1    fluticasone (FLONASE) 50 mcg/act nasal spray SPRAY 1 SPRAY INTO EACH NOSTRIL EVERY DAY (Patient not taking: Reported on 3/28/2024) 16 mL 3    fluticasone-umeclidinium-vilanterol (Trelegy Ellipta) 200-62.5-25 mcg/actuation AEPB inhaler Inhale 1 puff daily for 28 days Rinse mouth after use. (Patient not taking: Reported on 3/28/2024) 28 blister 0    fluticasone-umeclidinium-vilanterol (Trelegy Ellipta) 200-62.5-25 mcg/actuation AEPB inhaler Inhale 1 puff daily Rinse mouth after use. 42 blister 0    tirzepatide (Mounjaro) 10 MG/0.5ML Inject 0.5 mL (10 mg total) under the skin every 7 days (Patient not taking: Reported on 1/22/2024) 6 mL 1     No current facility-administered medications for this visit.     Allergies: Patient has no known allergies.    Objective:  Vitals:    03/28/24 0947   BP: 124/76   Pulse: 103   Temp: 98.2 °F (36.8 °C)   SpO2: 92%   Height: 6' 1.23\" (1.86 m)   Oxygen Therapy  SpO2: 92 %  .  Wt Readings from Last 3 Encounters:   01/22/24 (!) 154 kg (340 lb)   11/28/23 (!) 157 kg (346 lb)   10/04/23 (!) 158 kg (348 lb 3.2 oz)     Body mass index is 44.58 kg/m².    Physical Exam  Constitutional:       Appearance: He is well-developed.   HENT:      Head: Normocephalic " and atraumatic.   Eyes:      Pupils: Pupils are equal, round, and reactive to light.   Neck:      Comments: Short and wide neck  Cardiovascular:      Rate and Rhythm: Normal rate and regular rhythm.      Heart sounds: Normal heart sounds.   Pulmonary:      Effort: Pulmonary effort is normal.      Breath sounds: Normal breath sounds.   Musculoskeletal:         General: Normal range of motion.      Cervical back: Normal range of motion and neck supple.   Skin:     General: Skin is warm and dry.   Neurological:      Mental Status: He is alert and oriented to person, place, and time.   Psychiatric:         Behavior: Behavior normal.         Lab Review:   No visits with results within 6 Month(s) from this visit.   Latest known visit with results is:   Orders Only on 08/01/2023   Component Date Value    Total Cholesterol 08/01/2023 194     HDL 08/01/2023 39 (L)     Triglycerides 08/01/2023 181 (H)     LDL Calculated 08/01/2023 125 (H)     Chol HDLC Ratio 08/01/2023 5.0 (H)     Non-HDL Cholesterol 08/01/2023 155 (H)     Glucose, Random 08/01/2023 94     BUN 08/01/2023 16     Creatinine 08/01/2023 1.12     eGFR 08/01/2023 79     SL AMB BUN/CREATININE RA* 08/01/2023 SEE NOTE:     Sodium 08/01/2023 140     Potassium 08/01/2023 4.5     Chloride 08/01/2023 105     CO2 08/01/2023 26     Calcium 08/01/2023 9.8     Protein, Total 08/01/2023 6.9     Albumin 08/01/2023 4.0     Globulin 08/01/2023 2.9     Albumin/Globulin Ratio 08/01/2023 1.4     TOTAL BILIRUBIN 08/01/2023 0.4     Alkaline Phosphatase 08/01/2023 87     AST 08/01/2023 16     ALT 08/01/2023 24     Color UA 08/01/2023 YELLOW     Urine Appearance 08/01/2023 CLEAR     Specific Gravity 08/01/2023 1.017     Ph 08/01/2023 5.5     Glucose, Urine 08/01/2023 NEGATIVE     Bilirubin, Urine 08/01/2023 NEGATIVE     Ketone, Urine 08/01/2023 NEGATIVE     Blood, Urine 08/01/2023 NEGATIVE     Protein, Urine 08/01/2023 NEGATIVE     Nitrites Urine 08/01/2023 NEGATIVE     Leukocyte  Esterase 08/01/2023 TRACE (A)     SL AMB WBC, URINE 08/01/2023 0-5     RBC, Urine 08/01/2023 NONE SEEN     Squamous Epithelial Cells 08/01/2023 0-5     Bacteria, UA 08/01/2023 NONE SEEN     Hyaline Casts 08/01/2023 NONE SEEN     Comment(s) 08/01/2023      White Blood Cell Count 08/01/2023 10.6     Red Blood Cell Count 08/01/2023 5.78     Hemoglobin 08/01/2023 17.9 (H)     HCT 08/01/2023 53.3 (H)     MCV 08/01/2023 92.2     MCH 08/01/2023 31.0     MCHC 08/01/2023 33.6     RDW 08/01/2023 13.8     Platelet Count 08/01/2023 217     SL AMB MPV 08/01/2023 11.1     Neutrophils (Absolute) 08/01/2023 6,954     Lymphocytes (Absolute) 08/01/2023 2,449     Monocytes (Absolute) 08/01/2023 869     Eosinophils (Absolute) 08/01/2023 233     Basophils ABS 08/01/2023 95     Neutrophils 08/01/2023 65.6     Lymphocytes 08/01/2023 23.1     Monocytes 08/01/2023 8.2     Eosinophils 08/01/2023 2.2     Basophils PCT 08/01/2023 0.9     TSH 08/01/2023 2.06     Vitamin D, 25-Hydroxy, S* 08/01/2023 27 (L)     Urine Culture, Comprehen* 08/01/2023      Urine Culture Result 08/01/2023         Diagnostics:  I have personally reviewed pertinent reports.

## 2024-04-03 ENCOUNTER — RA CDI HCC (OUTPATIENT)
Dept: OTHER | Facility: HOSPITAL | Age: 54
End: 2024-04-03

## 2024-04-04 ENCOUNTER — PATIENT MESSAGE (OUTPATIENT)
Dept: BARIATRICS | Facility: CLINIC | Age: 54
End: 2024-04-04

## 2024-04-09 ENCOUNTER — OFFICE VISIT (OUTPATIENT)
Dept: FAMILY MEDICINE CLINIC | Facility: CLINIC | Age: 54
End: 2024-04-09
Payer: COMMERCIAL

## 2024-04-09 VITALS
BODY MASS INDEX: 41.75 KG/M2 | SYSTOLIC BLOOD PRESSURE: 132 MMHG | OXYGEN SATURATION: 97 % | HEIGHT: 73 IN | TEMPERATURE: 97.6 F | HEART RATE: 98 BPM | WEIGHT: 315 LBS | DIASTOLIC BLOOD PRESSURE: 84 MMHG

## 2024-04-09 DIAGNOSIS — Z00.00 ANNUAL PHYSICAL EXAM: Primary | ICD-10-CM

## 2024-04-09 DIAGNOSIS — E78.2 MIXED HYPERLIPIDEMIA: ICD-10-CM

## 2024-04-09 DIAGNOSIS — Z12.5 SCREENING FOR PROSTATE CANCER: ICD-10-CM

## 2024-04-09 DIAGNOSIS — R73.03 PREDIABETES: ICD-10-CM

## 2024-04-09 DIAGNOSIS — E66.01 OBESITY, CLASS III, BMI 40-49.9 (MORBID OBESITY) (HCC): ICD-10-CM

## 2024-04-09 PROCEDURE — 99396 PREV VISIT EST AGE 40-64: CPT | Performed by: FAMILY MEDICINE

## 2024-04-09 NOTE — PROGRESS NOTES
ADULT ANNUAL PHYSICAL  [facility] [address] [facility]    NAME: Manuel Resendiz  AGE: 53 y.o. SEX: male  : 1970     DATE: 2024     Assessment and Plan:     Problem List Items Addressed This Visit       Obesity, Class III, BMI 40-49.9 (morbid obesity) (HCC)    Relevant Orders    Comprehensive metabolic panel    TSH, 3rd generation    Mixed hyperlipidemia    Relevant Orders    Lipid Panel with Direct LDL reflex    Prediabetes    Relevant Orders    Hemoglobin A1C    Annual physical exam - Primary    Relevant Orders    CBC and differential    UA w Reflex to Microscopic w Reflex to Culture       Immunizations and preventive care screenings were discussed with patient today. Appropriate education was printed on patient's after visit summary.    Discussed risks and benefits of prostate cancer screening. We discussed the controversial history of PSA screening for prostate cancer in the [place] as well as the risk of over detection and over treatment of prostate cancer by way of PSA screening.  The patient understands that PSA blood testing is an imperfect way to screen for prostate cancer and that elevated PSA levels in the blood may also be caused by infection, inflammation, prostatic trauma or manipulation, urological procedures, or by benign prostatic enlargement.    The role of the digital rectal examination in prostate cancer screening was also discussed and I discussed with him that there is large interobserver variability in the findings of digital rectal examination.    Counseling:  Alcohol/drug use: discussed moderation in alcohol intake, the recommendations for healthy alcohol use, and avoidance of illicit drug use.  Dental Health: discussed importance of regular tooth brushing, flossing, and dental visits.  Exercise: the importance of regular exercise/physical activity was discussed. Recommend exercise 3-5 times per week for at  least 30 minutes.       Depression Screening and Follow-up Plan: Patient was screened for depression during today's encounter. They screened negative with a PHQ-2 score of 0.        No follow-ups on file.     Chief Complaint:     Chief Complaint   Patient presents with    Annual Exam      History of Present Illness:     Adult Annual Physical   Patient here for a comprehensive physical exam. The patient reports no problems.    Diet and Physical Activity  Diet/Nutrition: well balanced diet and limited junk food.   Exercise: walking.    md vega inhaler , neg issues to report , neg sob ,vijaya neg activity intolerance e  Md kennedy continues care , upcoming appt to discuss further care     Colonoscopy completed    Depression Screening  PHQ-2/9 Depression Screening    Little interest or pleasure in doing things: 0 - not at all  Feeling down, depressed, or hopeless: 0 - not at all  PHQ-2 Score: 0  PHQ-2 Interpretation: Negative depression screen       General Health  Sleep: sleeps well.   Hearing: normal - none .  Vision: goes for regular eye exams and wears glasses.   Dental: regular dental visits.        Health  Symptoms include: none       Review of Systems:     Review of Systems   Constitutional:  Negative for appetite change, chills, fever and unexpected weight change.   HENT:  Negative for congestion, dental problem, ear pain, hearing loss, postnasal drip, rhinorrhea, sinus pressure, sinus pain, sneezing, sore throat, tinnitus and voice change.    Eyes:  Negative for visual disturbance.   Respiratory:  Negative for apnea, cough, chest tightness and shortness of breath.    Cardiovascular:  Negative for chest pain, palpitations and leg swelling.   Gastrointestinal:  Negative for abdominal pain, blood in stool, constipation, diarrhea, nausea and vomiting.   Endocrine: Negative for cold intolerance, heat intolerance, polydipsia, polyphagia and polyuria.   Genitourinary:  Negative for decreased urine volume, difficulty  urinating, dysuria, frequency and hematuria.   Musculoskeletal:  Negative for arthralgias, back pain, gait problem, joint swelling and myalgias.   Skin:  Negative for color change, rash and wound.   Allergic/Immunologic: Negative for environmental allergies and food allergies.   Neurological:  Negative for dizziness, syncope, weakness, light-headedness, numbness and headaches.   Hematological:  Negative for adenopathy. Does not bruise/bleed easily.   Psychiatric/Behavioral:  Negative for sleep disturbance and suicidal ideas. The patient is not nervous/anxious.       Past Medical History:     Past Medical History:   Diagnosis Date    Cough     Cough     Cough     High cholesterol     Obesity, Class III, BMI 40-49.9 (morbid obesity) (HCC)     SOB (shortness of breath)     SOB (shortness of breath)     SOB (shortness of breath)     SOB (shortness of breath)     SOB (shortness of breath)     Wheezing     Wheezing     Wheezing       Past Surgical History:     Past Surgical History:   Procedure Laterality Date    APPENDECTOMY  1987    SHOULDER SURGERY  1210-2442    several      Family History:     Family History   Problem Relation Age of Onset    Drug abuse Mother     Lung cancer Father       Social History:     Social History     Socioeconomic History    Marital status: /Civil Union     Spouse name: None    Number of children: None    Years of education: None    Highest education level: None   Occupational History    Occupation: employed   Tobacco Use    Smoking status: Former     Current packs/day: 0.00     Average packs/day: 0.3 packs/day for 30.0 years (7.5 ttl pk-yrs)     Types: Cigarettes     Start date:      Quit date: 2020     Years since quittin.2    Smokeless tobacco: Never   Vaping Use    Vaping status: Never Used   Substance and Sexual Activity    Alcohol use: Yes     Comment: socially    Drug use: Never    Sexual activity: Yes   Other Topics Concern    None   Social History Narrative    None  "    Social Determinants of Health     Financial Resource Strain: Not on file   Food Insecurity: Not on file   Transportation Needs: Not on file   Physical Activity: Not on file   Stress: Not on file   Social Connections: Not on file   Intimate Partner Violence: Not on file   Housing Stability: Not on file      Current Medications:     Current Outpatient Medications   Medication Sig Dispense Refill    albuterol (2.5 mg/3 mL) 0.083 % nebulizer solution Take 3 mL (2.5 mg total) by nebulization every 6 (six) hours as needed for wheezing or shortness of breath 120 mL 0    albuterol (Ventolin HFA) 90 mcg/act inhaler Inhale 2 puffs every 6 (six) hours as needed for wheezing 18 g 3    budesonide (Pulmicort) 0.5 mg/2 mL nebulizer solution Take 2 mL (0.5 mg total) by nebulization 2 (two) times a day Rinse mouth after use. 360 mL 1    fluticasone-umeclidinium-vilanterol (Trelegy Ellipta) 200-62.5-25 mcg/actuation AEPB inhaler Inhale 1 puff daily Rinse mouth after use. 180 blister 1    meloxicam (MOBIC) 15 mg tablet TAKE 1 TABLET BY MOUTH DAILY 30 tablet 11    metFORMIN (GLUCOPHAGE) 500 mg tablet TAKE 1 TABLET BY MOUTH TWICE  DAILY WITH MEALS 180 tablet 0    rosuvastatin (CRESTOR) 5 mg tablet TAKE 1 TABLET BY MOUTH DAILY 90 tablet 3    tirzepatide (Mounjaro) 10 MG/0.5ML Inject 0.5 mL (10 mg total) under the skin every 7 days (Patient not taking: Reported on 1/22/2024) 6 mL 1     No current facility-administered medications for this visit.      Allergies:     No Known Allergies   Physical Exam:     /84 (BP Location: Left arm, Patient Position: Sitting)   Pulse 98   Temp 97.6 °F (36.4 °C)   Ht 6' 1.23\" (1.86 m)   Wt (!) 149 kg (328 lb)   SpO2 97%   BMI 43.00 kg/m²     Physical Exam  Vitals and nursing note reviewed.   Constitutional:       General: He is not in acute distress.     Appearance: He is well-developed. He is obese. He is not ill-appearing or toxic-appearing.   HENT:      Head: Normocephalic and atraumatic. "   Eyes:      Conjunctiva/sclera: Conjunctivae normal.   Neck:      Vascular: No carotid bruit.   Cardiovascular:      Rate and Rhythm: Normal rate and regular rhythm.      Heart sounds: No murmur heard.  Pulmonary:      Effort: Pulmonary effort is normal. No respiratory distress.      Breath sounds: Normal breath sounds.   Abdominal:      Palpations: Abdomen is soft.      Tenderness: There is no abdominal tenderness.   Musculoskeletal:         General: No swelling.      Cervical back: Neck supple.   Lymphadenopathy:      Cervical: No cervical adenopathy.   Skin:     General: Skin is warm and dry.      Capillary Refill: Capillary refill takes less than 2 seconds.   Neurological:      Mental Status: He is alert.   Psychiatric:         Mood and Affect: Mood normal.          WES Vera  [facility] [address] [facility]

## 2024-04-10 ENCOUNTER — TELEPHONE (OUTPATIENT)
Dept: ADMINISTRATIVE | Facility: OTHER | Age: 54
End: 2024-04-10

## 2024-04-10 DIAGNOSIS — E88.819 INSULIN RESISTANCE: ICD-10-CM

## 2024-04-10 DIAGNOSIS — E66.01 CLASS 3 SEVERE OBESITY DUE TO EXCESS CALORIES WITHOUT SERIOUS COMORBIDITY WITH BODY MASS INDEX (BMI) OF 45.0 TO 49.9 IN ADULT (HCC): ICD-10-CM

## 2024-04-10 NOTE — TELEPHONE ENCOUNTER
----- Message from Radha Jorge sent at 4/9/2024 10:34 AM EDT -----  Regarding: care gap request  04/09/24 10:34 AM    Hello, our patient attached above has had CRC: Colonoscopy completed/performed. Please assist in updating the patient chart by making an External outreach to [facility] located in [facility] . The date of service is January 2023.    Thank you,  Radha BYERS [address]

## 2024-04-10 NOTE — TELEPHONE ENCOUNTER
Upon review of the In Basket request and the patient's chart, initial outreach has been made via fax to facility. Please see Contacts section for details.     Thank you  Angel Caputo MA

## 2024-04-10 NOTE — LETTER
Procedure Request Form: Colonoscopy      Date Requested: 04/10/24  Patient: Manuel Trethaway  Patient : 1970   Referring Provider: WES Vera        Date of Procedure ______________________________       The above patient has informed us that they have completed their   most recent Colonoscopy at your facility. Please complete   this form and attach all corresponding procedure reports/results.    Comments _____Approx 2023_____________________________________________________  ____________________________________________________________________  ____________________________________________________________________  ____________________________________________________________________    Facility Completing Procedure _________________________________________    Form Completed By (print name) _______________________________________      Signature __________________________________________________________      These reports are needed for  compliance.    Please fax this completed form and a copy of the procedure report to our office located at [address] [place] [address] as soon as possible to Fax [phone] torri Ortiz: Phone [phone]    We thank you for your assistance in treating our mutual patient.

## 2024-04-11 NOTE — TELEPHONE ENCOUNTER
Upon review of the In Basket request we received back no treatment at this facility     Any additional questions or concerns should be emailed to the Practice Liaisons via the appropriate education email address, please do not reply via In Basket.    Thank you  Angel Caputo MA

## 2024-04-22 ENCOUNTER — TELEPHONE (OUTPATIENT)
Dept: BARIATRICS | Facility: CLINIC | Age: 54
End: 2024-04-22

## 2024-04-22 DIAGNOSIS — E66.01 OBESITY, CLASS III, BMI 40-49.9 (MORBID OBESITY) (HCC): ICD-10-CM

## 2024-04-22 DIAGNOSIS — R73.03 PREDIABETES: ICD-10-CM

## 2024-04-22 RX ORDER — PHENTERMINE HYDROCHLORIDE 15 MG/1
15 CAPSULE ORAL EVERY MORNING
Qty: 30 CAPSULE | Refills: 0 | Status: SHIPPED | OUTPATIENT
Start: 2024-04-22

## 2024-04-22 NOTE — TELEPHONE ENCOUNTER
Phentermine sent  Patient needs to come for BP check next mo    ----- Message from Reza Sarmiento sent at 4/22/2024  3:37 PM EDT -----  Regarding: Meds  Contact: [phone]  Good Day,    Please see return message from patient.  Will send request to  to schedule follow up / prescription request with Fitz.    Thank you.      ----- Message -----  From: Manuel Resendiz  Sent: 4/22/2024   3:25 PM EDT  To: [facility]  Subject: Meds                                             Hi Dr Concepcion,  You already saw me and we spoke about this. MOUNJARO ci timers to be unavailable and I’m in a waiting list. I haven’t had it in months. We discussed Phentermine at my last appt and you said you weee going to order it.

## 2024-04-22 NOTE — TELEPHONE ENCOUNTER
Good Day,    Please see return message from patient.  Will send request to  to schedule follow up / prescription request with Fitz.    Thank you. [employer] Counseling:  I discussed with the patient the risks of mycophenolate mofetil including but not limited to infection/immunosuppression, GI upset, hypokalemia, hypercholesterolemia, bone marrow suppression, lymphoproliferative disorders, malignancy, GI ulceration/bleed/perforation, colitis, interstitial lung disease, kidney failure, progressive multifocal leukoencephalopathy, and birth defects.  The patient understands that monitoring is required including a baseline creatinine and regular CBC testing. In addition, patient must alert us immediately if symptoms of infection or other concerning signs are noted.

## 2024-04-29 ENCOUNTER — TELEPHONE (OUTPATIENT)
Age: 54
End: 2024-04-29

## 2024-05-22 DIAGNOSIS — E66.01 OBESITY, CLASS III, BMI 40-49.9 (MORBID OBESITY) (HCC): ICD-10-CM

## 2024-05-22 DIAGNOSIS — R73.03 PREDIABETES: ICD-10-CM

## 2024-05-22 RX ORDER — TIRZEPATIDE 10 MG/.5ML
INJECTION, SOLUTION SUBCUTANEOUS
Qty: 2 ML | Refills: 11 | Status: SHIPPED | OUTPATIENT
Start: 2024-05-22

## 2024-05-22 NOTE — TELEPHONE ENCOUNTER
Good Day,    Please see patient message. I did write to him informing you would be in office next week.     Thank you

## 2024-05-30 LAB
ALBUMIN SERPL-MCNC: 4.2 G/DL (ref 3.6–5.1)
ALBUMIN/GLOB SERPL: 1.6 (CALC) (ref 1–2.5)
ALP SERPL-CCNC: 74 U/L (ref 35–144)
ALT SERPL-CCNC: 20 U/L (ref 9–46)
APPEARANCE UR: CLEAR
AST SERPL-CCNC: 14 U/L (ref 10–35)
BACTERIA UR QL AUTO: ABNORMAL /HPF
BASOPHILS # BLD AUTO: 73 CELLS/UL (ref 0–200)
BASOPHILS NFR BLD AUTO: 0.7 %
BILIRUB SERPL-MCNC: 0.6 MG/DL (ref 0.2–1.2)
BILIRUB UR QL STRIP: NEGATIVE
BUN SERPL-MCNC: 18 MG/DL (ref 7–25)
BUN/CREAT SERPL: NORMAL (CALC) (ref 6–22)
CALCIUM SERPL-MCNC: 9.7 MG/DL (ref 8.6–10.3)
CAOX CRY #/AREA URNS HPF: ABNORMAL /HPF
CHLORIDE SERPL-SCNC: 105 MMOL/L (ref 98–110)
CHOLEST SERPL-MCNC: 131 MG/DL
CHOLEST/HDLC SERPL: 4 (CALC)
CO2 SERPL-SCNC: 26 MMOL/L (ref 20–32)
COLOR UR: ABNORMAL
CREAT SERPL-MCNC: 1.13 MG/DL (ref 0.7–1.3)
EOSINOPHIL # BLD AUTO: 333 CELLS/UL (ref 15–500)
EOSINOPHIL NFR BLD AUTO: 3.2 %
ERYTHROCYTE [DISTWIDTH] IN BLOOD BY AUTOMATED COUNT: 13.2 % (ref 11–15)
GFR/BSA.PRED SERPLBLD CYS-BASED-ARV: 78 ML/MIN/1.73M2
GLOBULIN SER CALC-MCNC: 2.7 G/DL (CALC) (ref 1.9–3.7)
GLUCOSE SERPL-MCNC: 84 MG/DL (ref 65–99)
GLUCOSE UR QL STRIP: NEGATIVE
HBA1C MFR BLD: 5.9 % OF TOTAL HGB
HCT VFR BLD AUTO: 55.5 % (ref 38.5–50)
HDLC SERPL-MCNC: 33 MG/DL
HGB BLD-MCNC: 18.4 G/DL (ref 13.2–17.1)
HGB UR QL STRIP: NEGATIVE
HYALINE CASTS #/AREA URNS LPF: ABNORMAL /LPF
KETONES UR QL STRIP: NEGATIVE
LDLC SERPL CALC-MCNC: 75 MG/DL (CALC)
LEUKOCYTE ESTERASE UR QL STRIP: ABNORMAL
LYMPHOCYTES # BLD AUTO: 2444 CELLS/UL (ref 850–3900)
LYMPHOCYTES NFR BLD AUTO: 23.5 %
MCH RBC QN AUTO: 30.8 PG (ref 27–33)
MCHC RBC AUTO-ENTMCNC: 33.2 G/DL (ref 32–36)
MCV RBC AUTO: 93 FL (ref 80–100)
MONOCYTES # BLD AUTO: 770 CELLS/UL (ref 200–950)
MONOCYTES NFR BLD AUTO: 7.4 %
NEUTROPHILS # BLD AUTO: 6781 CELLS/UL (ref 1500–7800)
NEUTROPHILS NFR BLD AUTO: 65.2 %
NITRITE UR QL STRIP: NEGATIVE
NONHDLC SERPL-MCNC: 98 MG/DL (CALC)
PH UR STRIP: ABNORMAL [PH] (ref 5–8)
PLATELET # BLD AUTO: 215 THOUSAND/UL (ref 140–400)
PMV BLD REES-ECKER: 11.3 FL (ref 7.5–12.5)
POTASSIUM SERPL-SCNC: 4.7 MMOL/L (ref 3.5–5.3)
PROT SERPL-MCNC: 6.9 G/DL (ref 6.1–8.1)
PROT UR QL STRIP: NEGATIVE
PSA SERPL-MCNC: 0.3 NG/ML
RBC # BLD AUTO: 5.97 MILLION/UL (ref 4.2–5.8)
RBC #/AREA URNS HPF: ABNORMAL /HPF
SODIUM SERPL-SCNC: 140 MMOL/L (ref 135–146)
SP GR UR STRIP: 1.03 (ref 1–1.03)
SQUAMOUS #/AREA URNS HPF: ABNORMAL /HPF
TRIGL SERPL-MCNC: 142 MG/DL
TSH SERPL-ACNC: 1.59 MIU/L (ref 0.4–4.5)
WBC # BLD AUTO: 10.4 THOUSAND/UL (ref 3.8–10.8)
WBC #/AREA URNS HPF: ABNORMAL /HPF

## 2024-06-17 ENCOUNTER — DOCUMENTATION (OUTPATIENT)
Dept: BEHAVIORAL/MENTAL HEALTH CLINIC | Facility: CLINIC | Age: 54
End: 2024-06-17

## 2024-06-17 DIAGNOSIS — F41.9 ANXIETY: Primary | ICD-10-CM

## 2024-06-17 NOTE — PROGRESS NOTES
"Psychotherapy Discharge Summary    Preferred Name: Manuel Resendiz  YOB: 1970    Admission date to psychotherapy: 8/21/2023    Referred by: PCP    Presenting Problem: pt presents with trauma related symptoms since COVID     Course of treatment included : individual therapy     Progress/Outcome of Treatment Goals (brief summary of course of treatment) Pt presented to therapy with a lot of stressors both physical and emotional.  He was having a lot of trauma symptoms including: flashbacks, irritability, anger outbursts, difficulty concentrating.  Pt was very guarded initially due to his upbringing and certain core beliefs that he had such as \"men don't cry.\"  He was able to learn more about PTSD to help gain awareness into what was going on for him.  Therapist and pt worked on learning different strategies to help him manage his symptoms.     Treatment Complications (if any): Pt was inconsistent w/tx    Treatment Progress: fair    Current [facility] Provider: n/a    Discharge Medications include: n/a    Discharge Date: 2/5/24    Discharge Diagnosis:   1. Anxiety            Criteria for Discharge: two or more unexcused absences for services    Aftercare recommendations include (include specific referral names and phone numbers, if appropriate): none    Prognosis: fair    "

## 2024-07-08 ENCOUNTER — TELEPHONE (OUTPATIENT)
Dept: FAMILY MEDICINE CLINIC | Facility: CLINIC | Age: 54
End: 2024-07-08

## 2024-07-08 ENCOUNTER — OFFICE VISIT (OUTPATIENT)
Dept: FAMILY MEDICINE CLINIC | Facility: CLINIC | Age: 54
End: 2024-07-08
Payer: COMMERCIAL

## 2024-07-08 VITALS
SYSTOLIC BLOOD PRESSURE: 128 MMHG | HEART RATE: 100 BPM | DIASTOLIC BLOOD PRESSURE: 82 MMHG | OXYGEN SATURATION: 97 % | WEIGHT: 315 LBS | BODY MASS INDEX: 41.75 KG/M2 | TEMPERATURE: 97.7 F | HEIGHT: 73 IN

## 2024-07-08 DIAGNOSIS — R73.03 PREDIABETES: ICD-10-CM

## 2024-07-08 DIAGNOSIS — E78.2 MIXED HYPERLIPIDEMIA: ICD-10-CM

## 2024-07-08 DIAGNOSIS — E66.01 OBESITY, CLASS III, BMI 40-49.9 (MORBID OBESITY) (HCC): Primary | ICD-10-CM

## 2024-07-08 PROCEDURE — 99214 OFFICE O/P EST MOD 30 MIN: CPT | Performed by: FAMILY MEDICINE

## 2024-07-08 NOTE — ASSESSMENT & PLAN NOTE
Encourage, stressed importance of weight loss strategies dietary modifications continue current medications

## 2024-07-08 NOTE — PROGRESS NOTES
Ambulatory Visit  Name: Manuel Resendiz      : 1970      MRN: [id number]  Encounter Provider: WES Vera  Encounter Date: 2024   Encounter department: [facility] [address] [place] [address]    Assessment & Plan   1. Obesity, Class III, BMI 40-49.9 (morbid obesity) ([facility])  Assessment & Plan:  Encourage, stressed importance of weight loss strategies dietary modifications continue current medications  Orders:  -     Comprehensive metabolic panel; Future  -     Hemoglobin A1C; Future  -     TSH, 3rd generation; Future  -     Lipid Panel with Direct LDL reflex; Future  2. Mixed hyperlipidemia  Assessment & Plan:  Stable reviewed lipid profile tolerating statin to be continued  3. Prediabetes  Assessment & Plan:  A1c nondiabetic range continue to monitor       History of Present Illness     Here for follow-up  Doing well on current regimen in regard to weight loss  Tolerating medicines without any ill effects side effects  COVID, long continues regular follow-up with pulmonary  Presently denies any shortness of breath difficulty breathing chest pain palpitations  Cholesterol tolerating statin Crestor negative myalgias          Review of Systems   Constitutional:  Negative for appetite change, chills, fever and unexpected weight change.   HENT:  Negative for congestion, dental problem, ear pain, hearing loss, postnasal drip, rhinorrhea, sinus pressure, sinus pain, sneezing, sore throat, tinnitus and voice change.    Eyes:  Negative for visual disturbance.   Respiratory:  Negative for apnea, cough, chest tightness and shortness of breath.    Cardiovascular:  Negative for chest pain, palpitations and leg swelling.   Gastrointestinal:  Negative for abdominal pain, blood in stool, constipation, diarrhea, nausea and vomiting.   Endocrine: Negative for cold intolerance, heat intolerance, polydipsia, polyphagia and polyuria.   Genitourinary:  Negative for decreased urine volume, difficulty  "urinating, dysuria, frequency and hematuria.   Musculoskeletal:  Negative for arthralgias, back pain, gait problem, joint swelling and myalgias.   Skin:  Negative for color change, rash and wound.   Allergic/Immunologic: Negative for environmental allergies and food allergies.   Neurological:  Negative for dizziness, syncope, weakness, light-headedness, numbness and headaches.   Hematological:  Negative for adenopathy. Does not bruise/bleed easily.   Psychiatric/Behavioral:  Negative for sleep disturbance and suicidal ideas. The patient is not nervous/anxious.        Objective     /82 (BP Location: Left arm, Patient Position: Sitting)   Pulse 100   Temp 97.7 °F (36.5 °C)   Ht 6' 1.23\" (1.86 m)   Wt (!) 146 kg (321 lb 12.8 oz)   SpO2 97%   BMI 42.19 kg/m²     Physical Exam  Constitutional:       General: He is not in acute distress.     Appearance: He is well-developed. He is obese. He is not ill-appearing or toxic-appearing.   HENT:      Head: Normocephalic and atraumatic.   Cardiovascular:      Rate and Rhythm: Normal rate and regular rhythm.      Heart sounds: Normal heart sounds.   Pulmonary:      Effort: Pulmonary effort is normal.      Breath sounds: Normal breath sounds.   Abdominal:      General: Bowel sounds are normal.      Palpations: Abdomen is soft.   Musculoskeletal:         General: Normal range of motion.      Cervical back: Normal range of motion and neck supple.   Skin:     General: Skin is warm and dry.   Neurological:      Mental Status: He is alert and oriented to person, place, and time.      Deep Tendon Reflexes: Reflexes are normal and symmetric.   Psychiatric:         Behavior: Behavior normal.         Thought Content: Thought content normal.         Judgment: Judgment normal.       Administrative Statements           "

## 2024-07-10 NOTE — TELEPHONE ENCOUNTER
Pt  Was seen on 5/11/21, he stated that he feels a little better on the adviar but does not think he is able to return to work at full capacity yet  He was told to call the office back and Dr Mariano Session would extend his time off       Patient scheduled 7/2/21 to follow up No

## 2024-09-04 DIAGNOSIS — J45.40 MODERATE PERSISTENT ASTHMA WITHOUT COMPLICATION: ICD-10-CM

## 2024-09-05 RX ORDER — FLUTICASONE FUROATE, UMECLIDINIUM BROMIDE AND VILANTEROL TRIFENATATE 200; 62.5; 25 UG/1; UG/1; UG/1
POWDER RESPIRATORY (INHALATION)
Qty: 180 EACH | Refills: 3 | Status: SHIPPED | OUTPATIENT
Start: 2024-09-05

## 2024-10-15 ENCOUNTER — TELEPHONE (OUTPATIENT)
Age: 54
End: 2024-10-15

## 2024-12-19 ENCOUNTER — TELEPHONE (OUTPATIENT)
Age: 54
End: 2024-12-19

## 2024-12-19 NOTE — TELEPHONE ENCOUNTER
History and Physical    Pt Name: Martin Liao  MRN: [id number]  YOB: 1989  Date of evaluation: 3/9/2021    SUBJECTIVE:   History of Chief Complaint:    Patient presents for PAT appointment, complains of right ankle pain. She initially had an injury while working out, was being treated for a sprain (1/2020). She continued with pain, xrays showed a fracture, was casted for a time. Patient says that she underwent injections and then MRI when her pain started to change to \"sharp pains. \"  She has been scheduled for right ankle arthroscopy, OCD repair, peroneal tendon repair. Past Medical History    has a past medical history of Back pain, Dizzy spells, Right ankle injury, and Wellness examination. Past Surgical History  none  Medications  none  Allergies  has No Known Allergies. Family History  family history includes Hypothyroidism in her maternal aunt, mother, and sister; No Known Problems in her father. Social History   reports that she has never smoked. She has never used smokeless tobacco.   reports current alcohol use. reports no history of drug use. Marital Status   Occupation works for [employer] [place]:   VITALS:  height is 5' 3\" (1.6 m) and weight is 216 lb (98 kg). Her temporal temperature is 99.3 °F (37.4 °C). Her blood pressure is 130/89 and her pulse is 68. Her respiration is 18 and oxygen saturation is 97%. CONSTITUTIONAL:alert & oriented x 3, no acute distress. Very pleasant. SKIN:  Warm and dry, no rashes on exposed areas of skin. HEAD:  Normocephalic, atraumatic   EYES: PERRL. EOMs intact. EARS:  Hearing grossly WNL. NOSE:  Nares patent. No rhinorrhea   MOUTH/THROAT:  benign  NECK:supple, no lymphadenopathy  LUNGS: Clear to auscultation bilaterally, no wheezes. CARDIOVASCULAR: Heart sounds are normal.  Regular rate and rhythm without murmur. ABDOMEN: soft, non tender, non distended. EXTREMITIES: no edema bilateral lower extremities.     Testing: Phone call from patient stating he is having trouble with his left elbow being swollen again. He previously saw Dr Enriquez with [facility] and Manuel is asking if a referral could be placed for him to go see them again.  Please advise. Thank you.   [id number]  Labs pending: drawn 3/9/2021 and also in chart    IMPRESSIONS:   1. Right ankle pain, injury  2.  has a past medical history of Back pain, Dizzy spells, Right ankle injury, and Wellness examination (2021).    PLANS:   right ankle arthroscopy, OCD repair, peroneal tendon repair    WALKER Rey PA-C  Electronically signed 3/9/2021 at 10:17 AM

## 2024-12-23 DIAGNOSIS — M70.22 OLECRANON BURSITIS OF LEFT ELBOW: Primary | ICD-10-CM

## 2025-01-01 ENCOUNTER — NURSE TRIAGE (OUTPATIENT)
Dept: OTHER | Facility: OTHER | Age: 55
End: 2025-01-01

## 2025-01-01 DIAGNOSIS — R05.8 PRODUCTIVE COUGH: Primary | ICD-10-CM

## 2025-01-01 RX ORDER — AZITHROMYCIN 250 MG/1
TABLET, FILM COATED ORAL
Qty: 6 TABLET | Refills: 0 | Status: SHIPPED | OUTPATIENT
Start: 2025-01-01 | End: 2025-01-05

## 2025-01-01 NOTE — TELEPHONE ENCOUNTER
Reason for Disposition  • [1] Known COPD or other severe lung disease (i.e., bronchiectasis, cystic fibrosis, lung surgery) AND [2] symptoms getting worse (i.e., increased sputum purulence or amount, increased breathing difficulty    Protocols used: Cough - Acute Productive-Adult-AH

## 2025-01-01 NOTE — TELEPHONE ENCOUNTER
"Regarding: Sore throat/ Cough  ----- Message from Carlee OBRIEN sent at 1/1/2025  8:08 AM EST -----  Pt stated, \" I am calling because I am sick. I have a sore throat and a cough, I would like to have some medication prescribed.\"    "

## 2025-01-01 NOTE — TELEPHONE ENCOUNTER
"Answer Assessment - Initial Assessment Questions  1. ONSET: \"When did the cough begin?\"       Since yesterday    2. SEVERITY: \"How bad is the cough today?\"       Yes    3. SPUTUM: \"Describe the color of your sputum\" (e.g., none, dry cough; clear, white, yellow, green)      Yes- yellow    4. HEMOPTYSIS: \"Are you coughing up any blood?\" If Yes, ask: \"How much?\" (e.g., flecks, streaks, tablespoons, etc.)      No    5. DIFFICULTY BREATHING: \"Are you having difficulty breathing?\" If Yes, ask: \"How bad is it?\" (e.g., mild, moderate, severe)       Denies SOB. Does get pain in chest when coughing frequently.    6. FEVER: \"Do you have a fever?\" If Yes, ask: \"What is your temperature, how was it measured, and when did it start?\"      98.2 (tympanic)    7. CARDIAC HISTORY: \"Do you have any history of heart disease?\" (e.g., heart attack, congestive heart failure)       Denies    8. LUNG HISTORY: \"Do you have any history of lung disease?\"  (e.g., pulmonary embolus, asthma, emphysema)      Asthma- controlled    9. PE RISK FACTORS: \"Do you have a history of blood clots?\" (or: recent major surgery, recent prolonged travel, bedridden)      No    10. OTHER SYMPTOMS: \"Do you have any other symptoms?\" (e.g., runny nose, wheezing, chest pain)        Body aches, chills, sore throat. Denies that he feels his symptoms are COVID.    11.  TRAVEL: \"Have you traveled out of the country in the last month?\" (e.g., travel history, exposures)        Denies    Protocols used: Cough - Acute Productive-Adult-AH      Per on call Pulmonologist- ok to send in [facility] for pt.     Pt aware and verbalized understanding.     "

## 2025-01-02 ENCOUNTER — OFFICE VISIT (OUTPATIENT)
Dept: OBGYN CLINIC | Facility: CLINIC | Age: 55
End: 2025-01-02
Payer: COMMERCIAL

## 2025-01-02 VITALS — BODY MASS INDEX: 38.17 KG/M2 | HEIGHT: 73 IN | WEIGHT: 288 LBS

## 2025-01-02 DIAGNOSIS — M70.22 OLECRANON BURSITIS OF LEFT ELBOW: ICD-10-CM

## 2025-01-02 PROCEDURE — 99213 OFFICE O/P EST LOW 20 MIN: CPT | Performed by: FAMILY MEDICINE

## 2025-01-02 PROCEDURE — 20605 DRAIN/INJ JOINT/BURSA W/O US: CPT | Performed by: FAMILY MEDICINE

## 2025-01-02 RX ORDER — TRIAMCINOLONE ACETONIDE 40 MG/ML
40 INJECTION, SUSPENSION INTRA-ARTICULAR; INTRAMUSCULAR
Status: COMPLETED | OUTPATIENT
Start: 2025-01-02 | End: 2025-01-02

## 2025-01-02 RX ORDER — BUPIVACAINE HYDROCHLORIDE 2.5 MG/ML
2 INJECTION, SOLUTION INFILTRATION; PERINEURAL
Status: COMPLETED | OUTPATIENT
Start: 2025-01-02 | End: 2025-01-02

## 2025-01-02 RX ORDER — ALBUTEROL SULFATE 90 UG/1
2 INHALANT RESPIRATORY (INHALATION) EVERY 6 HOURS PRN
COMMUNITY

## 2025-01-02 RX ADMIN — BUPIVACAINE HYDROCHLORIDE 2 ML: 2.5 INJECTION, SOLUTION INFILTRATION; PERINEURAL at 11:15

## 2025-01-02 RX ADMIN — TRIAMCINOLONE ACETONIDE 40 MG: 40 INJECTION, SUSPENSION INTRA-ARTICULAR; INTRAMUSCULAR at 11:15

## 2025-01-02 NOTE — PROGRESS NOTES
Assessment/Plan:  Assessment & Plan   Diagnoses and all orders for this visit:    Olecranon bursitis of left elbow  -     Ambulatory Referral to Orthopedic Surgery  -     Medium joint arthrocentesis: L olecranon bursa    Other orders  -     albuterol (PROVENTIL HFA,VENTOLIN HFA) 90 mcg/act inhaler; Inhale 2 puffs every 6 (six) hours as needed for wheezing      54 year old RHD male with recurrence of left elbow swelling for 4 weeks. Discussed clinical exam, impression, and plan.  Clinical exam and history suggest olecranon bursitis.  No concern for infection at this time.  Recommend aspiration and steroid administration.  Aspirated 25 cc fluid and administered 1 cc kenalog to olecranon bursa without complication. May take meloxicam once daily for 1 week.  If episodes become more frequent will consider evaluation by elbow surgeon.  Follow-up as needed.        Subjective:   Patient ID: Manuel Resendiz is a 54 y.o. male.  Chief Complaint   Patient presents with    Left Elbow - Pain, Swelling        54 year old RHD male retired  presents for recurrence of left elbow swelling for 4 weeks. Denies trauma or inciting event. Was last seen 16 months ago and underwent aspiration for olecranon bursitis. He reports symptoms localized to posterior elbow, gradual onset, nonradiating, associated swelling, worse with leaning on the elbow and improved with removal of pressure. Denies pain with movement. Denies limited range of motion, numbness or weakness. Denies fever.    Elbow Pain  This is a recurrent problem. The current episode started 1 to 4 weeks ago. The problem occurs daily. The problem has been waxing and waning. Associated symptoms include arthralgias and joint swelling. Pertinent negatives include no numbness or weakness. Exacerbated by: direct pressure. He has tried rest, position changes, NSAIDs and ice for the symptoms. The treatment provided mild relief.               Review of Systems   Musculoskeletal:   "Positive for arthralgias and joint swelling.   Neurological:  Negative for weakness and numbness.       Objective:  Vitals:    01/02/25 1124   Weight: 131 kg (288 lb)   Height: 6' 1\" (1.854 m)      Left Hand Exam     Tenderness   The patient is experiencing no tenderness.     Range of Motion   The patient has normal left wrist ROM.    Muscle Strength   The patient has normal left wrist strength.    Tests   Tinel's sign (median nerve): negative    Other   Sensation: normal  Pulse: present      Left Elbow Exam     Tenderness   The patient is experiencing no tenderness.     Range of Motion   The patient has normal left elbow ROM.    Muscle Strength   The patient has normal left elbow strength (5/5 flexion and extension).    Tests   Tinel's sign (cubital tunnel): negative    Other   Sensation: normal    Comments:  Olecranon soft tissue swelling  Blanching hyperemia  No induration          Strength/Myotome Testing     Left Wrist/Hand   Normal wrist strength    Tests     Left Wrist/Hand   Negative Tinel's sign (medial nerve).       Physical Exam  Vitals and nursing note reviewed.   Constitutional:       Appearance: Normal appearance. He is well-developed. He is not ill-appearing or diaphoretic.   HENT:      Head: Normocephalic and atraumatic.      Right Ear: External ear normal.      Left Ear: External ear normal.   Eyes:      Conjunctiva/sclera: Conjunctivae normal.   Neck:      Trachea: No tracheal deviation.   Pulmonary:      Effort: Pulmonary effort is normal. No respiratory distress.   Abdominal:      General: There is no distension.   Musculoskeletal:         General: Swelling present. No tenderness.   Skin:     General: Skin is warm and dry.      Coloration: Skin is not jaundiced or pale.   Neurological:      Mental Status: He is alert and oriented to person, place, and time.   Psychiatric:         Mood and Affect: Mood normal.         Behavior: Behavior normal.         Thought Content: Thought content normal.    " "     Judgment: Judgment normal.         Medium joint arthrocentesis: L olecranon bursa  Universal Protocol:  procedure performed by consultantConsent: Verbal consent obtained.  Risks and benefits: risks, benefits and alternatives were discussed  Consent given by: patient  Time out: Immediately prior to procedure a \"time out\" was called to verify the correct patient, procedure, equipment, support staff and site/side marked as required.  Patient understanding: patient states understanding of the procedure being performed  Patient consent: the patient's understanding of the procedure matches consent given  Procedure consent: procedure consent matches procedure scheduled  Relevant documents: relevant documents present and verified  Test results: test results available and properly labeled  Site marked: the operative site was marked  Radiology Images displayed and confirmed. If images not available, report reviewed: imaging studies available  Required items: required blood products, implants, devices, and special equipment available  Patient identity confirmed: verbally with patient  Supporting Documentation  Indications: pain   Procedure Details  Location: elbow - L olecranon bursa  Preparation: Patient was prepped and draped in the usual sterile fashion  Needle size: 18 G  Ultrasound guidance: no  Approach: posterolateral  Medications administered: 2 mL bupivacaine 0.25 %; 40 mg triamcinolone acetonide 40 mg/mL    Aspirate amount: 25 mL  Aspirate: yellow and blood-tinged  Patient tolerance: patient tolerated the procedure well with no immediate complications  Dressing:  Sterile dressing applied                  "

## 2025-01-07 DIAGNOSIS — M54.50 LOW BACK PAIN WITHOUT SCIATICA, UNSPECIFIED BACK PAIN LATERALITY, UNSPECIFIED CHRONICITY: ICD-10-CM

## 2025-01-08 RX ORDER — MELOXICAM 15 MG/1
15 TABLET ORAL DAILY
Qty: 90 TABLET | Refills: 0 | Status: SHIPPED | OUTPATIENT
Start: 2025-01-08

## 2025-01-28 ENCOUNTER — OFFICE VISIT (OUTPATIENT)
Age: 55
End: 2025-01-28
Payer: COMMERCIAL

## 2025-01-28 VITALS
SYSTOLIC BLOOD PRESSURE: 100 MMHG | HEIGHT: 72 IN | RESPIRATION RATE: 22 BRPM | BODY MASS INDEX: 39.01 KG/M2 | OXYGEN SATURATION: 92 % | HEART RATE: 92 BPM | WEIGHT: 288 LBS | TEMPERATURE: 49.8 F | DIASTOLIC BLOOD PRESSURE: 60 MMHG

## 2025-01-28 DIAGNOSIS — R06.02 SOB (SHORTNESS OF BREATH): Primary | ICD-10-CM

## 2025-01-28 DIAGNOSIS — E66.9 OBESITY (BMI 30-39.9): ICD-10-CM

## 2025-01-28 DIAGNOSIS — J45.40 MODERATE PERSISTENT ASTHMA WITHOUT COMPLICATION: ICD-10-CM

## 2025-01-28 PROCEDURE — 99214 OFFICE O/P EST MOD 30 MIN: CPT | Performed by: INTERNAL MEDICINE

## 2025-01-28 RX ORDER — BUDESONIDE 0.5 MG/2ML
0.5 INHALANT ORAL 2 TIMES DAILY
Qty: 360 ML | Refills: 1 | Status: SHIPPED | OUTPATIENT
Start: 2025-01-28 | End: 2025-07-27

## 2025-01-28 RX ORDER — FLUTICASONE FUROATE, UMECLIDINIUM BROMIDE AND VILANTEROL TRIFENATATE 200; 62.5; 25 UG/1; UG/1; UG/1
1 POWDER RESPIRATORY (INHALATION) DAILY
Qty: 180 EACH | Refills: 3 | Status: SHIPPED | OUTPATIENT
Start: 2025-01-28 | End: 2025-04-28

## 2025-01-28 RX ORDER — ALBUTEROL SULFATE 90 UG/1
2 INHALANT RESPIRATORY (INHALATION) EVERY 6 HOURS PRN
Qty: 18 G | Refills: 1 | Status: SHIPPED | OUTPATIENT
Start: 2025-01-28 | End: 2025-01-30 | Stop reason: SDUPTHER

## 2025-01-28 NOTE — ASSESSMENT & PLAN NOTE
Shortness of breath and dyspnea on exertion with reactive airway disease moderate persistent asthma without complication not in exacerbation currently, also likely contributed by his obesity

## 2025-01-28 NOTE — PROGRESS NOTES
Name: Manuel Resendiz      : 1970      MRN: [id number]  Encounter Provider: Ashley Desai MD  Encounter Date: 2025   Encounter department: [facility] PULMONARY [facility]  :  Assessment & Plan  SOB (shortness of breath)  Shortness of breath and dyspnea on exertion with reactive airway disease moderate persistent asthma without complication not in exacerbation currently, also likely contributed by his obesity         Moderate persistent asthma without complication  PFTs with FEV1 of 51% with response to the bronchodilator in the small IV 6-minute walk test did not need any supplemental oxygen  Discussed with the patient to continue with Trelegy 1 puff daily  Rinse mouth after use  Continue with budesonide via the nebulizer twice daily and rinse mouth after use  Continue with albuterol via the nebulizer 4 times daily as needed    Orders:    albuterol (PROVENTIL HFA,VENTOLIN HFA) 90 mcg/act inhaler; Inhale 2 puffs every 6 (six) hours as needed for wheezing    fluticasone-umeclidinium-vilanterol (Trelegy Ellipta) 200-62.5-25 mcg/actuation AEPB inhaler; Inhale 1 puff daily Rinse mouth after use.    budesonide (Pulmicort) 0.5 mg/2 mL nebulizer solution; Take 2 mL (0.5 mg total) by nebulization 2 (two) times a day Rinse mouth after use.    Obesity (BMI 30-39.9)  He has lost around 100 pounds and is started on semaglutide he states  Congratulated the patient on the weight loss is continuing to follow-up at the bariatric clinic             History of Present Illness   Manuel Resendiz is a 54 y.o. 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 until he contracted COVID-19 infection in 2020 states he was not hospitalized at that time treated with supportive measures as well as steroids he has been having shortness of breath and  dyspnea on exertion for long being treated with long-acting bronchodilators currently  Now he slowly continues to feel better he is continuing to use his Trelegy and also added the budesonide via the nebulizer which she is using only once a day but states is not having the need to use his rescue inhaler frequently once or twice in a week only and although he is feeling better he states he is not completely back to his baseline  Also he states he has lost around 100 pounds has been on semaglutide and following up with bariatric clinic    Review of Systems   Constitutional: Negative.    HENT: Negative.     Eyes: Negative.    Respiratory:  Positive for shortness of breath.    Cardiovascular: Negative.    Gastrointestinal: Negative.    Endocrine: Negative.    Genitourinary: Negative.    Musculoskeletal: Negative.    Allergic/Immunologic: Negative.    Neurological: Negative.    Psychiatric/Behavioral: Negative.       Medical History Reviewed by provider this encounter:  Meds  Problems     .  Current Outpatient Medications on File Prior to Visit   Medication Sig Dispense Refill    albuterol (2.5 mg/3 mL) 0.083 % nebulizer solution Take 3 mL (2.5 mg total) by nebulization every 6 (six) hours as needed for wheezing or shortness of breath 120 mL 0    meloxicam (MOBIC) 15 mg tablet TAKE 1 TABLET BY MOUTH DAILY 90 tablet 0    metFORMIN (GLUCOPHAGE) 500 mg tablet TAKE 1 TABLET BY MOUTH TWICE  DAILY WITH MEALS 180 tablet 3    Mounjaro 10 MG/0.5ML INJECT THE CONTENTS OF ONE PEN  SUBCUTANEOUSLY WEEKLY AS  DIRECTED 2 mL 11    rosuvastatin (CRESTOR) 5 mg tablet TAKE 1 TABLET BY MOUTH DAILY 90 tablet 3    [DISCONTINUED] albuterol (PROVENTIL HFA,VENTOLIN HFA) 90 mcg/act inhaler Inhale 2 puffs every 6 (six) hours as needed for wheezing      [DISCONTINUED] Trelegy Ellipta 200-62.5-25 MCG/ACT AEPB inhaler USE 1 INHALATION BY MOUTH ONCE  DAILY AT THE SAME TIME EACH DAY  - RINSE MOUTH AFTER  each 3    [DISCONTINUED] budesonide  (Pulmicort) 0.5 mg/2 mL nebulizer solution Take 2 mL (0.5 mg total) by nebulization 2 (two) times a day Rinse mouth after use. 360 mL 1     No current facility-administered medications on file prior to visit.      Social History     Tobacco Use    Smoking status: Former     Current packs/day: 0.00     Average packs/day: 0.3 packs/day for 30.0 years (7.5 ttl pk-yrs)     Types: Cigarettes     Start date:      Quit date:      Years since quittin.0    Smokeless tobacco: Never   Vaping Use    Vaping status: Never Used   Substance and Sexual Activity    Alcohol use: Yes     Comment: socially    Drug use: Never    Sexual activity: Yes        Historical Information   Tobacco History: Never      Objective   /60 (BP Location: Left arm, Patient Position: Sitting, Cuff Size: Standard)   Pulse 92   Temp (!) 49.8 °F (9.9 °C) (Temporal)   Resp 22   Ht 6' (1.829 m)   Wt 131 kg (288 lb)   SpO2 92%   BMI 39.06 kg/m²      Physical Exam  Constitutional:       Appearance: He is well-developed.   HENT:      Head: Normocephalic and atraumatic.   Eyes:      Pupils: Pupils are equal, round, and reactive to light.   Neck:      Comments: Short and wide neck  Cardiovascular:      Rate and Rhythm: Normal rate and regular rhythm.      Heart sounds: Normal heart sounds.   Pulmonary:      Effort: Pulmonary effort is normal.      Breath sounds: Normal breath sounds.   Musculoskeletal:         General: Normal range of motion.      Cervical back: Normal range of motion and neck supple.   Skin:     General: Skin is warm and dry.   Neurological:      Mental Status: He is alert and oriented to person, place, and time.   Psychiatric:         Behavior: Behavior normal.           Lab Results: I have reviewed pertinent labs.    Radiology Results Review : No pertinent imaging studies reviewed.  Other Study Results: Other Study Results Review : No additional pertinent studies reviewed.  PFT Results Reviewed: reviewed

## 2025-01-30 ENCOUNTER — TELEPHONE (OUTPATIENT)
Age: 55
End: 2025-01-30

## 2025-01-30 DIAGNOSIS — J45.40 MODERATE PERSISTENT ASTHMA WITHOUT COMPLICATION: ICD-10-CM

## 2025-01-30 RX ORDER — ALBUTEROL SULFATE 0.83 MG/ML
2.5 SOLUTION RESPIRATORY (INHALATION) EVERY 6 HOURS PRN
Qty: 120 ML | Refills: 9 | Status: SHIPPED | OUTPATIENT
Start: 2025-01-30 | End: 2025-04-30

## 2025-01-30 RX ORDER — ALBUTEROL SULFATE 90 UG/1
2 INHALANT RESPIRATORY (INHALATION) EVERY 6 HOURS PRN
Qty: 18 G | Refills: 8 | Status: SHIPPED | OUTPATIENT
Start: 2025-01-30 | End: 2025-04-30

## 2025-01-30 NOTE — TELEPHONE ENCOUNTER
Added pharmacy to pt file under different name       [facility] [address], [address] [[id number]   Placed as number 1 in pharmacy.     Please send albuterol inhaler script

## 2025-01-30 NOTE — TELEPHONE ENCOUNTER
Susana from [facility] called in regarding the patient medication - Albuterol . The medication was sent to [facility] for Home delivery but due to the patient insurance [facility] . Susana called [facility] to have them transfer the medication over but she was told they have not received the patient medication script .   [facility]   NPI # [id number]  Phone # [phone]  Fax#[phone]

## 2025-01-31 ENCOUNTER — TELEPHONE (OUTPATIENT)
Age: 55
End: 2025-01-31

## 2025-01-31 NOTE — TELEPHONE ENCOUNTER
Pharmacy called requesting refill for inhaler. Patient made aware medication was refilled on 1/30/25 for 18 g with 8  refills to [facility] pharmacy. Patient instructed to contact the pharmacy to obtain refills of medication. Patient verbalized understanding.

## 2025-01-31 NOTE — TELEPHONE ENCOUNTER
Caller: Self    Doctor: Dr. Enriquez    Reason for call: Patient report swelling and fluid building up again on he elbow. Advised patient he may need to f/u with an elbow specialist per last office note. Wants to know what Dr Enriquez recommends he do next.     Call back#: [phone], can leave a message

## 2025-02-02 ENCOUNTER — NURSE TRIAGE (OUTPATIENT)
Dept: OTHER | Facility: OTHER | Age: 55
End: 2025-02-02

## 2025-02-02 DIAGNOSIS — R05.8 PRODUCTIVE COUGH: Primary | ICD-10-CM

## 2025-02-02 RX ORDER — AZITHROMYCIN 250 MG/1
TABLET, FILM COATED ORAL
Qty: 6 TABLET | Refills: 0 | Status: SHIPPED | OUTPATIENT
Start: 2025-02-02 | End: 2025-02-06

## 2025-02-02 NOTE — TELEPHONE ENCOUNTER
"Regarding: Cough, Chest Cold, Nasal Congestion, Z-Pack  ----- Message from Elana VERA sent at 2/2/2025 10:49 AM EST -----  \" I have a Cough, Chest Cold, Nasal Congestion, I would like to get a Z-Pack.\"    "

## 2025-02-02 NOTE — TELEPHONE ENCOUNTER
Patient calling in with concerns due to a productive cough, chest cold and nasal congestion. Stated his symptoms started about two days ago and he would like to be prescribed a Z-martina today in order to prevent the symptoms from getting any worse. Patient denies any fevers, shortness of breath or wheezing. On call provider contacted, gave a verbal order to send in a Z-martina at this time. Prescription sent to the patient's designated pharmacy. Patient made aware. Instructed patient to follow up with the office next week if his symptoms continue to worsen or he has any further concerns. Patient verbalized understanding.     Reason for Disposition   Cough    Protocols used: Cough - Acute Productive-Adult-

## 2025-02-02 NOTE — TELEPHONE ENCOUNTER
"Answer Assessment - Initial Assessment Questions  1. ONSET: \"When did the cough begin?\"             Started two days ago     2. SEVERITY: \"How bad is the cough today?\"             Mild cough but is a deep set chest cough     3. SPUTUM: \"Describe the color of your sputum\" (e.g., none, dry cough; clear, white, yellow, green)            Large amount of Clear mucus     4. HEMOPTYSIS: \"Are you coughing up any blood?\" If Yes, ask: \"How much?\" (e.g., flecks, streaks, tablespoons, etc.)            Denies     5. DIFFICULTY BREATHING: \"Are you having difficulty breathing?\" If Yes, ask: \"How bad is it?\" (e.g., mild, moderate, severe)             Denies     6. FEVER: \"Do you have a fever?\" If Yes, ask: \"What is your temperature, how was it measured, and when did it start?\"            Denies     7. CARDIAC HISTORY: \"Do you have any history of heart disease?\" (e.g., heart attack, congestive heart failure)            Denies     8. LUNG HISTORY: \"Do you have any history of lung disease?\"  (e.g., pulmonary embolus, asthma, emphysema)           [employer] Hauler     9. PE RISK FACTORS: \"Do you have a history of blood clots?\" (or: recent major surgery, recent prolonged travel, bedridden)           Denies     10. OTHER SYMPTOMS: \"Do you have any other symptoms?\" (e.g., runny nose, wheezing, chest pain)               Nasal congestion    Protocols used: Cough - Acute Productive-Adult-AH    "

## 2025-02-04 DIAGNOSIS — M70.22 OLECRANON BURSITIS OF LEFT ELBOW: Primary | ICD-10-CM

## 2025-03-20 ENCOUNTER — TELEPHONE (OUTPATIENT)
Age: 55
End: 2025-03-20

## 2025-03-20 NOTE — TELEPHONE ENCOUNTER
Spoke with pt regarding scheduling of recommended fu for July with dashawn or erik   pt stated he could not schedule at this time and would call back when he needs an appt   pt declined scheduling

## 2025-04-22 ENCOUNTER — TELEPHONE (OUTPATIENT)
Dept: FAMILY MEDICINE CLINIC | Facility: CLINIC | Age: 55
End: 2025-04-22

## 2025-04-22 NOTE — TELEPHONE ENCOUNTER
Called patient and he will give us a call back to schedule a physical appointment after he gets his blood work done at [facility]

## 2025-04-26 DIAGNOSIS — J45.40 MODERATE PERSISTENT ASTHMA WITHOUT COMPLICATION: ICD-10-CM

## 2025-04-28 RX ORDER — ALBUTEROL SULFATE 90 UG/1
2 INHALANT RESPIRATORY (INHALATION) EVERY 6 HOURS PRN
Qty: 26.8 G | Refills: 1 | Status: SHIPPED | OUTPATIENT
Start: 2025-04-28 | End: 2025-07-27

## 2025-06-30 DIAGNOSIS — J45.40 MODERATE PERSISTENT ASTHMA WITHOUT COMPLICATION: ICD-10-CM

## 2025-07-01 RX ORDER — FLUTICASONE FUROATE, UMECLIDINIUM BROMIDE AND VILANTEROL TRIFENATATE 200; 62.5; 25 UG/1; UG/1; UG/1
1 POWDER RESPIRATORY (INHALATION) DAILY
Qty: 180 EACH | Refills: 0 | Status: SHIPPED | OUTPATIENT
Start: 2025-07-01 | End: 2025-09-29

## 2025-07-01 RX ORDER — ALBUTEROL SULFATE 90 UG/1
2 INHALANT RESPIRATORY (INHALATION) EVERY 6 HOURS PRN
Qty: 26.8 G | Refills: 2 | Status: SHIPPED | OUTPATIENT
Start: 2025-07-01 | End: 2025-09-29

## 2025-07-21 ENCOUNTER — TELEPHONE (OUTPATIENT)
Dept: FAMILY MEDICINE CLINIC | Facility: CLINIC | Age: 55
End: 2025-07-21

## 2025-07-21 DIAGNOSIS — E78.2 MIXED HYPERLIPIDEMIA: ICD-10-CM

## 2025-07-21 DIAGNOSIS — R73.03 PREDIABETES: ICD-10-CM

## 2025-07-21 DIAGNOSIS — E66.813 OBESITY, CLASS III, BMI 40-49.9 (MORBID OBESITY): Primary | ICD-10-CM
